# Patient Record
Sex: MALE | Race: WHITE | HISPANIC OR LATINO | Employment: FULL TIME | URBAN - METROPOLITAN AREA
[De-identification: names, ages, dates, MRNs, and addresses within clinical notes are randomized per-mention and may not be internally consistent; named-entity substitution may affect disease eponyms.]

---

## 2017-01-23 ENCOUNTER — HOSPITAL ENCOUNTER (EMERGENCY)
Facility: HOSPITAL | Age: 42
Discharge: HOME | End: 2017-01-23
Attending: EMERGENCY MEDICINE | Admitting: EMERGENCY MEDICINE

## 2017-01-23 ENCOUNTER — APPOINTMENT (EMERGENCY)
Dept: RADIOLOGY | Facility: HOSPITAL | Age: 42
End: 2017-01-23

## 2017-01-23 VITALS
WEIGHT: 251 LBS | RESPIRATION RATE: 18 BRPM | TEMPERATURE: 98.2 F | OXYGEN SATURATION: 96 % | HEART RATE: 106 BPM | DIASTOLIC BLOOD PRESSURE: 103 MMHG | SYSTOLIC BLOOD PRESSURE: 171 MMHG

## 2017-01-23 DIAGNOSIS — S93.409A ANKLE SPRAIN: Primary | ICD-10-CM

## 2017-01-23 PROCEDURE — 99283 EMERGENCY DEPT VISIT LOW MDM: CPT

## 2017-01-23 PROCEDURE — 73610 X-RAY EXAM OF ANKLE: CPT

## 2017-01-23 RX ORDER — TRAMADOL HYDROCHLORIDE 50 MG/1
50 TABLET ORAL EVERY 6 HOURS PRN
Qty: 15 TABLET | Refills: 0 | Status: SHIPPED | OUTPATIENT
Start: 2017-01-23 | End: 2019-04-09

## 2018-07-02 ENCOUNTER — HOSPITAL ENCOUNTER (EMERGENCY)
Facility: HOSPITAL | Age: 43
Discharge: HOME | End: 2018-07-02
Attending: EMERGENCY MEDICINE | Admitting: EMERGENCY MEDICINE

## 2018-07-02 ENCOUNTER — APPOINTMENT (EMERGENCY)
Dept: CT IMAGING | Facility: HOSPITAL | Age: 43
End: 2018-07-02

## 2018-07-02 VITALS
HEIGHT: 67 IN | HEART RATE: 94 BPM | BODY MASS INDEX: 40.4 KG/M2 | SYSTOLIC BLOOD PRESSURE: 169 MMHG | WEIGHT: 257.4 LBS | OXYGEN SATURATION: 99 % | RESPIRATION RATE: 16 BRPM | DIASTOLIC BLOOD PRESSURE: 84 MMHG | TEMPERATURE: 99.1 F

## 2018-07-02 DIAGNOSIS — N20.0 LEFT NEPHROLITHIASIS: Primary | ICD-10-CM

## 2018-07-02 LAB
BACTERIA UR QL AUTO: ABNORMAL /HPF
BILIRUB UR QL STRIP: NEGATIVE
CLARITY UR: CLEAR
COLOR UR: YELLOW
GLUCOSE UR STRIP-MCNC: NEGATIVE MG/DL
HGB UR QL STRIP.AUTO: ABNORMAL
KETONES UR STRIP-MCNC: NEGATIVE MG/DL
LEUKOCYTE ESTERASE UR QL STRIP: NEGATIVE
NITRITE UR QL STRIP: NEGATIVE
NON-SQ EPI CELLS URNS QL MICRO: ABNORMAL /HPF
PH UR STRIP.AUTO: 6 [PH] (ref 4.5–8)
PROT UR STRIP-MCNC: ABNORMAL MG/DL
RBC #/AREA URNS AUTO: ABNORMAL /HPF
SP GR UR STRIP.AUTO: 1.02 (ref 1–1.03)
UROBILINOGEN UR QL STRIP.AUTO: 0.2 E.U./DL
WBC #/AREA URNS AUTO: ABNORMAL /HPF

## 2018-07-02 PROCEDURE — 99284 EMERGENCY DEPT VISIT MOD MDM: CPT

## 2018-07-02 PROCEDURE — 74176 CT ABD & PELVIS W/O CONTRAST: CPT

## 2018-07-02 PROCEDURE — 81001 URINALYSIS AUTO W/SCOPE: CPT

## 2018-07-02 RX ORDER — NAPROXEN 500 MG/1
500 TABLET ORAL 2 TIMES DAILY PRN
Qty: 20 TABLET | Refills: 0 | Status: SHIPPED | OUTPATIENT
Start: 2018-07-02 | End: 2019-04-09

## 2018-07-02 NOTE — DISCHARGE INSTRUCTIONS
Kidney Stones   WHAT YOU NEED TO KNOW:   Kidney stones form in the urinary system when the water and waste in your urine are out of balance  When this happens, certain types of waste crystals separate from the urine  The crystals build up and form kidney stones  You may have 1 or more kidney stones  DISCHARGE INSTRUCTIONS:   Return to the emergency department if:   · You have vomiting that is not relieved by medicine  Contact your healthcare provider if:   · You have a fever  · You have trouble passing urine  · You see blood in your urine  · You have severe pain  · You have any questions or concerns about your condition or care  Medicines:   · NSAIDs , such as ibuprofen, help decrease swelling, pain, and fever  This medicine is available with or without a doctor's order  NSAIDs can cause stomach bleeding or kidney problems in certain people  If you take blood thinner medicine, always ask your healthcare provider if NSAIDs are safe for you  Always read the medicine label and follow directions  · Prescription medicine  may be given  Ask how to take this medicine safely  · Medicines  to balance your electrolytes may be needed  · Take your medicine as directed  Contact your healthcare provider if you think your medicine is not helping or if you have side effects  Tell him or her if you are allergic to any medicine  Keep a list of the medicines, vitamins, and herbs you take  Include the amounts, and when and why you take them  Bring the list or the pill bottles to follow-up visits  Carry your medicine list with you in case of an emergency  Follow up with your healthcare provider as directed: You may need to return for more tests  Write down your questions so you remember to ask them during your visits  Self-care:   · Drink plenty of liquids  Your healthcare provider may tell you to drink at least 8 to 12 (eight-ounce) cups of liquids each day   This helps flush out the kidney stones when you urinate  Water is the best liquid to drink  · Strain your urine every time you go to the bathroom  Urinate through a strainer or a piece of thin cloth to catch the stones  Take the stones to your healthcare provider so they can be sent to the lab for tests  This will help your healthcare providers plan the best treatment for you  · Eat a variety of healthy foods  Healthy foods include fruits, vegetables, whole-grain breads, low-fat dairy products, beans, and fish  You may need to limit how much sodium (salt) or protein you eat  Ask for information about the best foods for you  · Stay active  Your stones may pass more easily by if you stay active  Ask about the best activities for you  After you pass your kidney stones:  Once you have passed your kidney stones, your healthcare provider may  order a 24-hour urine test  Results from a 24-hour urine test will help your healthcare provider plan ways to prevent more stones from forming  If you are told to do a 24-hour test, your healthcare provider will give you more instructions  © 2017 [facility] Information is for End User's use only and may not be sold, redistributed or otherwise used for commercial purposes  All illustrations and images included in CareNotes® are the copyrighted property of A D A M , Inc  or [employer]  The above information is an  only  It is not intended as medical advice for individual conditions or treatments  Talk to your doctor, nurse or pharmacist before following any medical regimen to see if it is safe and effective for you

## 2018-07-02 NOTE — ED NOTES
Discharge instructions reviewed by Dr Addi Lee  Pt verbalized understanding        Antony George RN  07/02/18 0143

## 2018-07-02 NOTE — ED PROVIDER NOTES
History  Chief Complaint   Patient presents with    Back Pain     pt ambulatory on unit with c/o intermittent lower back pain x2 days  denies injury        History provided by:  Patient and spouse  Back Pain   Location:  Lumbar spine (left side)  Quality:  Aching  Radiates to:  Does not radiate  Pain severity:  Moderate  Pain is:  Same all the time  Onset quality:  Gradual  Duration:  2 days  Timing:  Intermittent  Progression:  Waxing and waning  Chronicity:  New  Context: not falling and not MVA    Context comment:  No injury no history of overuse type pattern  , started gradually  Relieved by:  None tried  Worsened by:  Nothing  Ineffective treatments:  None tried  Associated symptoms: no abdominal pain, no bladder incontinence, no bowel incontinence, no chest pain, no dysuria, no fever, no headaches, no numbness, no pelvic pain, no perianal numbness, no tingling, no weakness and no weight loss        Prior to Admission Medications   Prescriptions Last Dose Informant Patient Reported? Taking?   traMADol (ULTRAM) 50 mg tablet   No No   Sig: Take 1 tablet by mouth every 6 (six) hours as needed for moderate pain for up to 15 doses      Facility-Administered Medications: None       Past Medical History:   Diagnosis Date    Hyperlipidemia     Hypertension        History reviewed  No pertinent surgical history  History reviewed  No pertinent family history  I have reviewed and agree with the history as documented  Social History   Substance Use Topics    Smoking status: Current Every Day Smoker     Packs/day: 2 00     Types: Cigarettes    Smokeless tobacco: Never Used    Alcohol use Yes      Comment: social        Review of Systems   Constitutional: Negative for activity change, chills, diaphoresis, fever and weight loss  HENT: Negative for congestion, sinus pressure and sore throat  Eyes: Negative for pain and visual disturbance     Respiratory: Negative for cough, chest tightness, shortness of breath, wheezing and stridor  Cardiovascular: Negative for chest pain and palpitations  Gastrointestinal: Negative for abdominal distention, abdominal pain, bowel incontinence, constipation, diarrhea, nausea and vomiting  Genitourinary: Negative for bladder incontinence, dysuria, frequency and pelvic pain  Musculoskeletal: Positive for back pain  Negative for neck pain and neck stiffness  Skin: Negative for rash  Neurological: Negative for dizziness, tingling, speech difficulty, weakness, light-headedness, numbness and headaches  Physical Exam  Physical Exam   Constitutional: He is oriented to person, place, and time  He appears well-developed  No distress  HENT:   Head: Normocephalic and atraumatic  Eyes: Pupils are equal, round, and reactive to light  Neck: Normal range of motion  Neck supple  No tracheal deviation present  Cardiovascular: Normal rate, regular rhythm, normal heart sounds and intact distal pulses  No murmur heard  Pulmonary/Chest: Effort normal and breath sounds normal  No stridor  No respiratory distress  Abdominal: Soft  He exhibits no distension  There is no tenderness  There is no rebound and no guarding  No CVA tenderness     Musculoskeletal: Normal range of motion  No spinous process tenderness,  , +2 patella and Achilles reflex bilaterally  Normal sensation normal muscle strength bilateral lower extremities     Neurological: He is alert and oriented to person, place, and time  Skin: Skin is warm and dry  He is not diaphoretic  No erythema  No pallor  Psychiatric: He has a normal mood and affect  Vitals reviewed        Vital Signs  ED Triage Vitals   Temperature Pulse Respirations Blood Pressure SpO2   07/02/18 1409 07/02/18 1409 07/02/18 1409 07/02/18 1411 07/02/18 1409   99 1 °F (37 3 °C) (!) 106 18 (!) 186/112 97 %      Temp Source Heart Rate Source Patient Position - Orthostatic VS BP Location FiO2 (%)   07/02/18 1409 07/02/18 1409 07/02/18 1411 07/02/18 1411 --   Oral Monitor Lying Right arm       Pain Score       07/02/18 1409       9           Vitals:    07/02/18 1409 07/02/18 1411 07/02/18 1525 07/02/18 1740   BP:  (!) 186/112 (!) 190/94 169/84   Pulse: (!) 106  98 94   Patient Position - Orthostatic VS:  Lying Lying Lying       Visual Acuity  Visual Acuity      Most Recent Value   L Pupil Size (mm)  3   R Pupil Size (mm)  3          ED Medications  Medications - No data to display    Diagnostic Studies  Results Reviewed     Procedure Component Value Units Date/Time    Urine Microscopic [[id number]]  (Abnormal) Collected:  07/02/18 1540    Lab Status:  Final result Specimen:  Urine from Urine, Clean Catch Updated:  07/02/18 1603     RBC, UA 1-2 (A) /hpf      WBC, UA 0-1 (A) /hpf      Epithelial Cells None Seen /hpf      Bacteria, UA None Seen /hpf     ED Urine Macroscopic [[id number]]  (Abnormal) Collected:  07/02/18 1544    Lab Status:  Final result Specimen:  Urine Updated:  07/02/18 1538     Color, UA Yellow     Clarity, UA Clear     pH, UA 6 0     Leukocytes, UA Negative     Nitrite, UA Negative     Protein, UA Trace (A) mg/dl      Glucose, UA Negative mg/dl      Ketones, UA Negative mg/dl      Urobilinogen, UA 0 2 E U /dl      Bilirubin, UA Negative     Blood, UA Moderate (A)     Specific Wallace, UA 1 025    Narrative:       CLINITEK RESULT                 CT renal stone study abdomen pelvis without contrast    (Results Pending)              Procedures  Procedures       Phone Contacts  ED Phone Contact    ED Course                               MDM  Number of Diagnoses or Management Options  Left nephrolithiasis: new and requires workup  Diagnosis management comments:       Initial ED assessment:  46-year-old male presents with left lower back pain   No injury    Initial DDx includes but is not limited to:  Muscular strain, nephrolithiasis    Initial ED plan:   Urinalysis, CT        Final ED summary/disposition:   After evaluation and workup in the emergency department,   patient found have a 2 mm kidney stone  , stuck at the UVJ  Pain controlled patient be discharged       Amount and/or Complexity of Data Reviewed  Clinical lab tests: ordered and reviewed  Tests in the radiology section of CPT®: ordered and reviewed  Decide to obtain previous medical records or to obtain history from someone other than the patient: yes  Obtain history from someone other than the patient: yes  Review and summarize past medical records: yes  Independent visualization of images, tracings, or specimens: yes      CritCare Time    Disposition  Final diagnoses:   Left nephrolithiasis     Time reflects when diagnosis was documented in both MDM as applicable and the Disposition within this note     Time User Action Codes Description Comment    7/2/2018  5:54 PM Rosita Larger Add [N20 0] Left nephrolithiasis       ED Disposition     ED Disposition Condition Comment    Discharge  [facility] discharge to home/self care  Condition at discharge: Good        Follow-up Information    None         Patient's Medications   Discharge Prescriptions    NAPROXEN (NAPROSYN) 500 MG TABLET    Take 1 tablet (500 mg total) by mouth 2 (two) times a day as needed for mild pain       Start Date: 7/2/2018  End Date: --       Order Dose: 500 mg       Quantity: 20 tablet    Refills: 0     No discharge procedures on file      ED Provider  Electronically Signed by           Yisel Lipscomb DO  07/02/18 1556

## 2019-04-09 ENCOUNTER — OFFICE VISIT (OUTPATIENT)
Dept: FAMILY MEDICINE CLINIC | Facility: CLINIC | Age: 44
End: 2019-04-09

## 2019-04-09 VITALS
HEIGHT: 67 IN | RESPIRATION RATE: 18 BRPM | BODY MASS INDEX: 40.53 KG/M2 | SYSTOLIC BLOOD PRESSURE: 150 MMHG | HEART RATE: 90 BPM | DIASTOLIC BLOOD PRESSURE: 98 MMHG | WEIGHT: 258.2 LBS | OXYGEN SATURATION: 98 %

## 2019-04-09 DIAGNOSIS — E66.01 CLASS 3 SEVERE OBESITY DUE TO EXCESS CALORIES WITH SERIOUS COMORBIDITY AND BODY MASS INDEX (BMI) OF 40.0 TO 44.9 IN ADULT (HCC): ICD-10-CM

## 2019-04-09 DIAGNOSIS — I10 ESSENTIAL HYPERTENSION: ICD-10-CM

## 2019-04-09 DIAGNOSIS — F17.210 CIGARETTE NICOTINE DEPENDENCE WITHOUT COMPLICATION: ICD-10-CM

## 2019-04-09 DIAGNOSIS — R06.83 LOUD SNORING: ICD-10-CM

## 2019-04-09 DIAGNOSIS — E78.5 HYPERLIPIDEMIA, UNSPECIFIED HYPERLIPIDEMIA TYPE: Primary | ICD-10-CM

## 2019-04-09 PROCEDURE — 99386 PREV VISIT NEW AGE 40-64: CPT | Performed by: PHYSICIAN ASSISTANT

## 2019-04-09 RX ORDER — LOSARTAN POTASSIUM 50 MG/1
50 TABLET ORAL DAILY
Qty: 30 TABLET | Refills: 0 | Status: SHIPPED | OUTPATIENT
Start: 2019-04-09 | End: 2019-04-16

## 2019-04-09 RX ORDER — VARENICLINE TARTRATE 25 MG
KIT ORAL
Qty: 53 TABLET | Refills: 0 | Status: SHIPPED | OUTPATIENT
Start: 2019-04-09 | End: 2019-04-11 | Stop reason: SDUPTHER

## 2019-04-11 DIAGNOSIS — F17.210 CIGARETTE NICOTINE DEPENDENCE WITHOUT COMPLICATION: ICD-10-CM

## 2019-04-11 RX ORDER — VARENICLINE TARTRATE 25 MG
KIT ORAL
Qty: 53 TABLET | Refills: 0 | Status: SHIPPED | OUTPATIENT
Start: 2019-04-11 | End: 2019-05-21

## 2019-04-14 ENCOUNTER — TELEPHONE (OUTPATIENT)
Dept: OTHER | Facility: OTHER | Age: 44
End: 2019-04-14

## 2019-04-14 LAB — HBA1C MFR BLD HPLC: 5.8 %

## 2019-04-15 ENCOUNTER — TELEPHONE (OUTPATIENT)
Dept: FAMILY MEDICINE CLINIC | Facility: CLINIC | Age: 44
End: 2019-04-15

## 2019-04-16 ENCOUNTER — TELEPHONE (OUTPATIENT)
Dept: FAMILY MEDICINE CLINIC | Facility: CLINIC | Age: 44
End: 2019-04-16

## 2019-04-16 DIAGNOSIS — I10 ESSENTIAL HYPERTENSION: ICD-10-CM

## 2019-04-16 RX ORDER — LOSARTAN POTASSIUM 100 MG/1
100 TABLET ORAL DAILY
Start: 2019-04-16 | End: 2019-04-26

## 2019-04-26 ENCOUNTER — TELEPHONE (OUTPATIENT)
Dept: FAMILY MEDICINE CLINIC | Facility: CLINIC | Age: 44
End: 2019-04-26

## 2019-04-26 DIAGNOSIS — I10 ESSENTIAL HYPERTENSION: ICD-10-CM

## 2019-04-26 RX ORDER — LOSARTAN POTASSIUM 100 MG/1
100 TABLET ORAL DAILY
Qty: 90 TABLET
Start: 2019-04-26 | End: 2019-04-29 | Stop reason: SDUPTHER

## 2019-04-26 RX ORDER — METOPROLOL TARTRATE 100 MG/1
100 TABLET ORAL EVERY 12 HOURS SCHEDULED
Qty: 180 TABLET | Refills: 0 | Status: SHIPPED | OUTPATIENT
Start: 2019-04-26 | End: 2019-04-29 | Stop reason: SDUPTHER

## 2019-04-29 ENCOUNTER — TELEPHONE (OUTPATIENT)
Dept: FAMILY MEDICINE CLINIC | Facility: CLINIC | Age: 44
End: 2019-04-29

## 2019-04-29 DIAGNOSIS — I10 ESSENTIAL HYPERTENSION: ICD-10-CM

## 2019-04-29 RX ORDER — LOSARTAN POTASSIUM 100 MG/1
100 TABLET ORAL DAILY
Qty: 90 TABLET | Refills: 0 | Status: SHIPPED | OUTPATIENT
Start: 2019-04-29 | End: 2019-06-20 | Stop reason: SDUPTHER

## 2019-04-29 RX ORDER — LOSARTAN POTASSIUM 100 MG/1
100 TABLET ORAL DAILY
Qty: 90 TABLET | Refills: 0 | Status: SHIPPED | OUTPATIENT
Start: 2019-04-29 | End: 2019-04-29 | Stop reason: SDUPTHER

## 2019-04-29 RX ORDER — METOPROLOL TARTRATE 100 MG/1
100 TABLET ORAL EVERY 12 HOURS SCHEDULED
Qty: 180 TABLET | Refills: 0 | Status: SHIPPED | OUTPATIENT
Start: 2019-04-29 | End: 2019-06-25 | Stop reason: SDUPTHER

## 2019-05-06 DIAGNOSIS — I10 ESSENTIAL HYPERTENSION: ICD-10-CM

## 2019-05-06 RX ORDER — LOSARTAN POTASSIUM 50 MG/1
TABLET ORAL
Qty: 30 TABLET | Refills: 0 | OUTPATIENT
Start: 2019-05-06

## 2019-05-09 ENCOUNTER — OFFICE VISIT (OUTPATIENT)
Dept: FAMILY MEDICINE CLINIC | Facility: CLINIC | Age: 44
End: 2019-05-09

## 2019-05-09 VITALS
RESPIRATION RATE: 18 BRPM | HEIGHT: 67 IN | BODY MASS INDEX: 41.75 KG/M2 | HEART RATE: 79 BPM | WEIGHT: 266 LBS | DIASTOLIC BLOOD PRESSURE: 90 MMHG | OXYGEN SATURATION: 98 % | SYSTOLIC BLOOD PRESSURE: 150 MMHG

## 2019-05-09 DIAGNOSIS — E66.01 CLASS 3 SEVERE OBESITY DUE TO EXCESS CALORIES WITH SERIOUS COMORBIDITY AND BODY MASS INDEX (BMI) OF 40.0 TO 44.9 IN ADULT (HCC): ICD-10-CM

## 2019-05-09 DIAGNOSIS — E78.2 MIXED HYPERLIPIDEMIA: ICD-10-CM

## 2019-05-09 DIAGNOSIS — F17.210 CIGARETTE NICOTINE DEPENDENCE WITHOUT COMPLICATION: ICD-10-CM

## 2019-05-09 DIAGNOSIS — I10 ESSENTIAL HYPERTENSION: Primary | ICD-10-CM

## 2019-05-09 PROCEDURE — 99212 OFFICE O/P EST SF 10 MIN: CPT | Performed by: PHYSICIAN ASSISTANT

## 2019-05-09 RX ORDER — ATORVASTATIN CALCIUM 20 MG/1
20 TABLET, FILM COATED ORAL DAILY
Qty: 90 TABLET | Refills: 0 | Status: SHIPPED | OUTPATIENT
Start: 2019-05-09 | End: 2019-06-25 | Stop reason: SDUPTHER

## 2019-05-09 RX ORDER — HYDROCHLOROTHIAZIDE 12.5 MG/1
12.5 TABLET ORAL DAILY
Qty: 30 TABLET | Refills: 0 | Status: SHIPPED | OUTPATIENT
Start: 2019-05-09 | End: 2019-06-04 | Stop reason: SDUPTHER

## 2019-05-20 ENCOUNTER — TELEPHONE (OUTPATIENT)
Dept: FAMILY MEDICINE CLINIC | Facility: CLINIC | Age: 44
End: 2019-05-20

## 2019-05-21 DIAGNOSIS — F17.210 CIGARETTE NICOTINE DEPENDENCE WITHOUT COMPLICATION: Primary | ICD-10-CM

## 2019-05-21 RX ORDER — VARENICLINE TARTRATE 1 MG/1
1 TABLET, FILM COATED ORAL 2 TIMES DAILY
Qty: 60 TABLET | Refills: 0 | Status: SHIPPED | OUTPATIENT
Start: 2019-05-21 | End: 2020-01-15 | Stop reason: ALTCHOICE

## 2019-06-04 DIAGNOSIS — I10 ESSENTIAL HYPERTENSION: ICD-10-CM

## 2019-06-04 RX ORDER — HYDROCHLOROTHIAZIDE 12.5 MG/1
TABLET ORAL
Qty: 30 TABLET | Refills: 0 | Status: SHIPPED | OUTPATIENT
Start: 2019-06-04 | End: 2019-06-25 | Stop reason: SDUPTHER

## 2019-06-08 DIAGNOSIS — I10 ESSENTIAL HYPERTENSION: ICD-10-CM

## 2019-06-10 RX ORDER — LOSARTAN POTASSIUM 50 MG/1
TABLET ORAL
Qty: 90 TABLET | Refills: 1 | Status: SHIPPED | OUTPATIENT
Start: 2019-06-10 | End: 2019-06-20

## 2019-06-18 ENCOUNTER — TELEPHONE (OUTPATIENT)
Dept: FAMILY MEDICINE CLINIC | Facility: CLINIC | Age: 44
End: 2019-06-18

## 2019-06-20 DIAGNOSIS — I10 ESSENTIAL HYPERTENSION: ICD-10-CM

## 2019-06-20 RX ORDER — LOSARTAN POTASSIUM 100 MG/1
100 TABLET ORAL DAILY
Qty: 90 TABLET | Refills: 0 | Status: SHIPPED | OUTPATIENT
Start: 2019-06-20 | End: 2019-10-09 | Stop reason: SDUPTHER

## 2019-06-25 ENCOUNTER — OFFICE VISIT (OUTPATIENT)
Dept: FAMILY MEDICINE CLINIC | Facility: CLINIC | Age: 44
End: 2019-06-25

## 2019-06-25 VITALS
DIASTOLIC BLOOD PRESSURE: 84 MMHG | RESPIRATION RATE: 18 BRPM | HEART RATE: 85 BPM | WEIGHT: 264 LBS | OXYGEN SATURATION: 98 % | BODY MASS INDEX: 41.44 KG/M2 | SYSTOLIC BLOOD PRESSURE: 138 MMHG | HEIGHT: 67 IN

## 2019-06-25 DIAGNOSIS — I10 ESSENTIAL HYPERTENSION: ICD-10-CM

## 2019-06-25 DIAGNOSIS — E78.2 MIXED HYPERLIPIDEMIA: ICD-10-CM

## 2019-06-25 DIAGNOSIS — E66.01 CLASS 3 SEVERE OBESITY DUE TO EXCESS CALORIES WITH SERIOUS COMORBIDITY AND BODY MASS INDEX (BMI) OF 40.0 TO 44.9 IN ADULT (HCC): ICD-10-CM

## 2019-06-25 DIAGNOSIS — F17.210 CIGARETTE NICOTINE DEPENDENCE WITHOUT COMPLICATION: Primary | ICD-10-CM

## 2019-06-25 DIAGNOSIS — R06.83 LOUD SNORING: ICD-10-CM

## 2019-06-25 PROCEDURE — 99212 OFFICE O/P EST SF 10 MIN: CPT | Performed by: PHYSICIAN ASSISTANT

## 2019-06-25 RX ORDER — HYDROCHLOROTHIAZIDE 12.5 MG/1
12.5 TABLET ORAL DAILY
Qty: 90 TABLET | Refills: 0 | Status: SHIPPED | OUTPATIENT
Start: 2019-06-25 | End: 2019-08-26 | Stop reason: SDUPTHER

## 2019-06-25 RX ORDER — METOPROLOL TARTRATE 100 MG/1
100 TABLET ORAL EVERY 12 HOURS SCHEDULED
Qty: 180 TABLET | Refills: 0 | Status: SHIPPED | OUTPATIENT
Start: 2019-06-25 | End: 2020-01-15 | Stop reason: SDUPTHER

## 2019-06-25 RX ORDER — ATORVASTATIN CALCIUM 20 MG/1
20 TABLET, FILM COATED ORAL DAILY
Qty: 90 TABLET | Refills: 0 | Status: SHIPPED | OUTPATIENT
Start: 2019-06-25 | End: 2020-01-15 | Stop reason: SDUPTHER

## 2019-08-26 DIAGNOSIS — I10 ESSENTIAL HYPERTENSION: ICD-10-CM

## 2019-08-26 RX ORDER — HYDROCHLOROTHIAZIDE 12.5 MG/1
TABLET ORAL
Qty: 90 TABLET | Refills: 0 | Status: SHIPPED | OUTPATIENT
Start: 2019-08-26 | End: 2020-01-15 | Stop reason: SDUPTHER

## 2019-10-09 DIAGNOSIS — I10 ESSENTIAL HYPERTENSION: ICD-10-CM

## 2019-10-10 RX ORDER — LOSARTAN POTASSIUM 100 MG/1
100 TABLET ORAL DAILY
Qty: 90 TABLET | Refills: 0 | Status: SHIPPED | OUTPATIENT
Start: 2019-10-10 | End: 2020-01-15 | Stop reason: SDUPTHER

## 2020-01-15 ENCOUNTER — OFFICE VISIT (OUTPATIENT)
Dept: FAMILY MEDICINE CLINIC | Facility: CLINIC | Age: 45
End: 2020-01-15

## 2020-01-15 VITALS
SYSTOLIC BLOOD PRESSURE: 128 MMHG | RESPIRATION RATE: 18 BRPM | WEIGHT: 258.9 LBS | HEART RATE: 76 BPM | BODY MASS INDEX: 40.63 KG/M2 | DIASTOLIC BLOOD PRESSURE: 74 MMHG | HEIGHT: 67 IN | OXYGEN SATURATION: 98 %

## 2020-01-15 DIAGNOSIS — E78.2 MIXED HYPERLIPIDEMIA: ICD-10-CM

## 2020-01-15 DIAGNOSIS — E66.01 MORBID OBESITY (HCC): ICD-10-CM

## 2020-01-15 DIAGNOSIS — I10 ESSENTIAL HYPERTENSION: ICD-10-CM

## 2020-01-15 DIAGNOSIS — IMO0002 LUMP: Primary | ICD-10-CM

## 2020-01-15 PROCEDURE — 99213 OFFICE O/P EST LOW 20 MIN: CPT | Performed by: NURSE PRACTITIONER

## 2020-01-15 RX ORDER — HYDROCHLOROTHIAZIDE 12.5 MG/1
12.5 TABLET ORAL DAILY
Qty: 90 TABLET | Refills: 0 | Status: SHIPPED | OUTPATIENT
Start: 2020-01-15 | End: 2020-11-11 | Stop reason: SDUPTHER

## 2020-01-15 RX ORDER — METOPROLOL TARTRATE 100 MG/1
100 TABLET ORAL EVERY 12 HOURS SCHEDULED
Qty: 180 TABLET | Refills: 0 | Status: SHIPPED | OUTPATIENT
Start: 2020-01-15 | End: 2020-11-11 | Stop reason: ALTCHOICE

## 2020-01-15 RX ORDER — LOSARTAN POTASSIUM 100 MG/1
100 TABLET ORAL DAILY
Qty: 90 TABLET | Refills: 0 | Status: SHIPPED | OUTPATIENT
Start: 2020-01-15 | End: 2020-11-11 | Stop reason: SDUPTHER

## 2020-01-15 RX ORDER — ATORVASTATIN CALCIUM 20 MG/1
20 TABLET, FILM COATED ORAL DAILY
Qty: 90 TABLET | Refills: 0 | Status: SHIPPED | OUTPATIENT
Start: 2020-01-15 | End: 2020-11-11 | Stop reason: SDUPTHER

## 2020-01-15 NOTE — PROGRESS NOTES
Assessment/Plan:      Diagnoses and all orders for this visit:    Lump  -     Ambulatory referral to General Surgery; Future    Lump noted on left upper arm  Patient reports that he has had it for years  No signs of infection noted  Patient referred to Dr Conner Lu for further evaluation  Essential hypertension  -     losartan (COZAAR) 100 MG tablet; Take 1 tablet (100 mg total) by mouth daily  -     metoprolol tartrate (LOPRESSOR) 100 mg tablet; Take 1 tablet (100 mg total) by mouth every 12 (twelve) hours  -     hydrochlorothiazide (HYDRODIURIL) 12 5 mg tablet; Take 1 tablet (12 5 mg total) by mouth daily  -     Comprehensive metabolic panel; Future  -     CBC and differential; Future  -     Lipid Panel with Direct LDL reflex; Future    /74  Continue current medications  Lab work ordered  Morbid obesity ([address] )  -     Comprehensive metabolic panel; Future  -     CBC and differential; Future  -     TSH, 3rd generation with Free T4 reflex; Future    Patient instructed to eat a healthy low fat diet and to exercise on a regular basis  Mixed hyperlipidemia  -     atorvastatin (LIPITOR) 20 mg tablet; Take 1 tablet (20 mg total) by mouth daily  -     Comprehensive metabolic panel; Future  -     CBC and differential; Future  -     TSH, 3rd generation with Free T4 reflex; Future  -     Lipid Panel with Direct LDL reflex; Future    Lipid panel ordered  Low fat diet reviewed  Patient instructed to follow-up in 3 months or sooner prn  BMI Counseling: Body mass index is 40 55 kg/m²  The BMI is above normal  Nutrition recommendations include encouraging healthy choices of fruits and vegetables and consuming healthier snacks  Exercise recommendations include exercising 3-5 times per week  Subjective:     Patient ID: Nola Kessler is a 45 y o  male  Patient is here for a follow-up for chronic medical conditions  Patient is here for a follow-up for HTN   Patient reports that he takes losartan, metoprolol, and HCTZ for HTN  Denies any chest pain, SOB, palpitations, dizziness, or Has  Patient reports that he does not check his blood pressure at home  Patient is here for a follow-up for hyperlipidemia  Patient reports that he takes atorvastatin daily  Denies any side effects  Patient is here for a follow-up for obesity  Patient reports that he does not watch his diet  Patient reports that he does not exercise on a regular basis  Patient reports a lump on his left upper arm  Patient reports that he has had it for years  Denies any pain, fever, or drainage  Patient reports that it has gotten alittle larger  Review of Systems   Constitutional: Negative for appetite change, chills, fatigue and fever  Respiratory: Negative for cough, chest tightness, shortness of breath and wheezing  Cardiovascular: Negative for chest pain, palpitations and leg swelling  Gastrointestinal: Negative for abdominal pain, diarrhea, nausea and vomiting  Genitourinary: Negative for dysuria, frequency and hematuria  Musculoskeletal: Negative for myalgias  Skin: Negative for rash  Neurological: Negative for dizziness, seizures, syncope, light-headedness and headaches  Psychiatric/Behavioral: Negative for suicidal ideas  Denies any depression  Objective:     Physical Exam   Constitutional: He is oriented to person, place, and time  No distress  obese   HENT:   Right Ear: External ear normal    Left Ear: External ear normal    Mouth/Throat: Oropharynx is clear and moist    Eyes: Pupils are equal, round, and reactive to light  Conjunctivae are normal    Neck: Normal range of motion  Cardiovascular: Normal rate, regular rhythm and normal heart sounds  Radial pulses +2 bilaterally  No edema noted  Pulmonary/Chest: Effort normal and breath sounds normal    Musculoskeletal:   Gait wnl  Lymphadenopathy:     He has no cervical adenopathy     Neurological: He is alert and oriented to person, place, and time  Skin: No rash noted  Firm lump noted on left upper arm  No redness, swelling, or warmth noted  Psychiatric: He has a normal mood and affect  Vitals reviewed

## 2020-11-11 ENCOUNTER — OFFICE VISIT (OUTPATIENT)
Dept: FAMILY MEDICINE CLINIC | Facility: CLINIC | Age: 45
End: 2020-11-11

## 2020-11-11 VITALS
WEIGHT: 254 LBS | SYSTOLIC BLOOD PRESSURE: 164 MMHG | HEART RATE: 98 BPM | HEIGHT: 67 IN | DIASTOLIC BLOOD PRESSURE: 98 MMHG | BODY MASS INDEX: 39.87 KG/M2 | OXYGEN SATURATION: 98 % | TEMPERATURE: 98.2 F | RESPIRATION RATE: 20 BRPM

## 2020-11-11 DIAGNOSIS — E66.9 OBESITY (BMI 30-39.9): ICD-10-CM

## 2020-11-11 DIAGNOSIS — E78.5 HYPERLIPIDEMIA, UNSPECIFIED HYPERLIPIDEMIA TYPE: ICD-10-CM

## 2020-11-11 DIAGNOSIS — I10 ESSENTIAL HYPERTENSION: Primary | ICD-10-CM

## 2020-11-11 PROCEDURE — 99212 OFFICE O/P EST SF 10 MIN: CPT | Performed by: NURSE PRACTITIONER

## 2020-11-11 RX ORDER — HYDROCHLOROTHIAZIDE 12.5 MG/1
12.5 TABLET ORAL DAILY
Qty: 30 TABLET | Refills: 0 | Status: SHIPPED | OUTPATIENT
Start: 2020-11-11 | End: 2020-12-16 | Stop reason: SDUPTHER

## 2020-11-11 RX ORDER — ATORVASTATIN CALCIUM 20 MG/1
20 TABLET, FILM COATED ORAL DAILY
Qty: 30 TABLET | Refills: 0 | Status: SHIPPED | OUTPATIENT
Start: 2020-11-11 | End: 2021-01-14 | Stop reason: SDUPTHER

## 2020-11-11 RX ORDER — LOSARTAN POTASSIUM 50 MG/1
50 TABLET ORAL DAILY
Qty: 30 TABLET | Refills: 0 | Status: SHIPPED | OUTPATIENT
Start: 2020-11-11 | End: 2020-11-18 | Stop reason: SDUPTHER

## 2020-11-11 RX ORDER — ATORVASTATIN CALCIUM 20 MG/1
20 TABLET, FILM COATED ORAL DAILY
Qty: 30 TABLET | Refills: 0 | Status: SHIPPED | OUTPATIENT
Start: 2020-11-11 | End: 2020-11-11 | Stop reason: SDUPTHER

## 2020-11-16 ENCOUNTER — LAB (OUTPATIENT)
Dept: LAB | Facility: CLINIC | Age: 45
End: 2020-11-16

## 2020-11-16 ENCOUNTER — TRANSCRIBE ORDERS (OUTPATIENT)
Dept: LAB | Facility: CLINIC | Age: 45
End: 2020-11-16

## 2020-11-16 DIAGNOSIS — E66.9 OBESITY (BMI 30-39.9): ICD-10-CM

## 2020-11-16 DIAGNOSIS — I10 ESSENTIAL HYPERTENSION: ICD-10-CM

## 2020-11-16 DIAGNOSIS — E78.5 HYPERLIPIDEMIA, UNSPECIFIED HYPERLIPIDEMIA TYPE: ICD-10-CM

## 2020-11-16 LAB
ALBUMIN SERPL BCP-MCNC: 3.6 G/DL (ref 3.5–5)
ALP SERPL-CCNC: 88 U/L (ref 46–116)
ALT SERPL W P-5'-P-CCNC: 31 U/L (ref 12–78)
ANION GAP SERPL CALCULATED.3IONS-SCNC: 12 MMOL/L (ref 4–13)
AST SERPL W P-5'-P-CCNC: 12 U/L (ref 5–45)
BASOPHILS # BLD AUTO: 0.11 THOUSANDS/ΜL (ref 0–0.1)
BASOPHILS NFR BLD AUTO: 1 % (ref 0–1)
BILIRUB SERPL-MCNC: 0.45 MG/DL (ref 0.2–1)
BUN SERPL-MCNC: 10 MG/DL (ref 5–25)
CALCIUM SERPL-MCNC: 9 MG/DL (ref 8.3–10.1)
CHLORIDE SERPL-SCNC: 106 MMOL/L (ref 100–108)
CHOLEST SERPL-MCNC: 179 MG/DL (ref 50–200)
CO2 SERPL-SCNC: 26 MMOL/L (ref 21–32)
CREAT SERPL-MCNC: 0.87 MG/DL (ref 0.6–1.3)
EOSINOPHIL # BLD AUTO: 0.46 THOUSAND/ΜL (ref 0–0.61)
EOSINOPHIL NFR BLD AUTO: 4 % (ref 0–6)
ERYTHROCYTE [DISTWIDTH] IN BLOOD BY AUTOMATED COUNT: 16.9 % (ref 11.6–15.1)
GFR SERPL CREATININE-BSD FRML MDRD: 106 ML/MIN/1.73SQ M
GLUCOSE P FAST SERPL-MCNC: 94 MG/DL (ref 65–99)
HCT VFR BLD AUTO: 50.9 % (ref 36.5–49.3)
HDLC SERPL-MCNC: 25 MG/DL
HGB BLD-MCNC: 15.8 G/DL (ref 12–17)
IMM GRANULOCYTES # BLD AUTO: 0.03 THOUSAND/UL (ref 0–0.2)
IMM GRANULOCYTES NFR BLD AUTO: 0 % (ref 0–2)
LDLC SERPL CALC-MCNC: 116 MG/DL (ref 0–100)
LYMPHOCYTES # BLD AUTO: 4.32 THOUSANDS/ΜL (ref 0.6–4.47)
LYMPHOCYTES NFR BLD AUTO: 41 % (ref 14–44)
MCH RBC QN AUTO: 24 PG (ref 26.8–34.3)
MCHC RBC AUTO-ENTMCNC: 31 G/DL (ref 31.4–37.4)
MCV RBC AUTO: 77 FL (ref 82–98)
MONOCYTES # BLD AUTO: 0.91 THOUSAND/ΜL (ref 0.17–1.22)
MONOCYTES NFR BLD AUTO: 9 % (ref 4–12)
NEUTROPHILS # BLD AUTO: 4.76 THOUSANDS/ΜL (ref 1.85–7.62)
NEUTS SEG NFR BLD AUTO: 45 % (ref 43–75)
NRBC BLD AUTO-RTO: 0 /100 WBCS
PLATELET # BLD AUTO: 379 THOUSANDS/UL (ref 149–390)
PMV BLD AUTO: 9.2 FL (ref 8.9–12.7)
POTASSIUM SERPL-SCNC: 4 MMOL/L (ref 3.5–5.3)
PROT SERPL-MCNC: 6.9 G/DL (ref 6.4–8.2)
RBC # BLD AUTO: 6.58 MILLION/UL (ref 3.88–5.62)
SODIUM SERPL-SCNC: 144 MMOL/L (ref 136–145)
TRIGL SERPL-MCNC: 191 MG/DL
WBC # BLD AUTO: 10.59 THOUSAND/UL (ref 4.31–10.16)

## 2020-11-16 PROCEDURE — 80053 COMPREHEN METABOLIC PANEL: CPT

## 2020-11-16 PROCEDURE — 36415 COLL VENOUS BLD VENIPUNCTURE: CPT

## 2020-11-16 PROCEDURE — 80061 LIPID PANEL: CPT

## 2020-11-16 PROCEDURE — 85025 COMPLETE CBC W/AUTO DIFF WBC: CPT

## 2020-11-18 ENCOUNTER — OFFICE VISIT (OUTPATIENT)
Dept: FAMILY MEDICINE CLINIC | Facility: CLINIC | Age: 45
End: 2020-11-18

## 2020-11-18 VITALS
SYSTOLIC BLOOD PRESSURE: 168 MMHG | WEIGHT: 253 LBS | TEMPERATURE: 97.8 F | OXYGEN SATURATION: 98 % | HEART RATE: 92 BPM | BODY MASS INDEX: 39.71 KG/M2 | HEIGHT: 67 IN | RESPIRATION RATE: 16 BRPM | DIASTOLIC BLOOD PRESSURE: 98 MMHG

## 2020-11-18 DIAGNOSIS — R79.89 ABNORMAL CBC: ICD-10-CM

## 2020-11-18 DIAGNOSIS — E78.5 HYPERLIPIDEMIA, UNSPECIFIED HYPERLIPIDEMIA TYPE: ICD-10-CM

## 2020-11-18 DIAGNOSIS — E66.9 OBESITY (BMI 30-39.9): ICD-10-CM

## 2020-11-18 DIAGNOSIS — I10 ESSENTIAL HYPERTENSION: Primary | ICD-10-CM

## 2020-11-18 PROCEDURE — 99212 OFFICE O/P EST SF 10 MIN: CPT | Performed by: NURSE PRACTITIONER

## 2020-11-18 RX ORDER — LOSARTAN POTASSIUM 100 MG/1
100 TABLET ORAL DAILY
Qty: 30 TABLET | Refills: 1 | Status: SHIPPED | OUTPATIENT
Start: 2020-11-18 | End: 2020-12-16 | Stop reason: SDUPTHER

## 2020-11-19 ENCOUNTER — TELEPHONE (OUTPATIENT)
Dept: SURGICAL ONCOLOGY | Facility: CLINIC | Age: 45
End: 2020-11-19

## 2020-11-25 ENCOUNTER — OFFICE VISIT (OUTPATIENT)
Dept: FAMILY MEDICINE CLINIC | Facility: CLINIC | Age: 45
End: 2020-11-25

## 2020-11-25 VITALS
HEART RATE: 94 BPM | DIASTOLIC BLOOD PRESSURE: 110 MMHG | OXYGEN SATURATION: 98 % | RESPIRATION RATE: 18 BRPM | SYSTOLIC BLOOD PRESSURE: 190 MMHG | WEIGHT: 257.4 LBS | BODY MASS INDEX: 40.4 KG/M2 | HEIGHT: 67 IN

## 2020-11-25 DIAGNOSIS — E78.5 HYPERLIPIDEMIA, UNSPECIFIED HYPERLIPIDEMIA TYPE: ICD-10-CM

## 2020-11-25 DIAGNOSIS — I10 ESSENTIAL HYPERTENSION: Primary | ICD-10-CM

## 2020-11-25 PROCEDURE — 99212 OFFICE O/P EST SF 10 MIN: CPT | Performed by: NURSE PRACTITIONER

## 2020-11-25 RX ORDER — CLONIDINE HYDROCHLORIDE 0.1 MG/1
0.1 TABLET ORAL EVERY 12 HOURS SCHEDULED
Qty: 180 TABLET | Refills: 0 | Status: SHIPPED | OUTPATIENT
Start: 2020-11-25 | End: 2020-12-30 | Stop reason: SDUPTHER

## 2020-12-02 ENCOUNTER — OFFICE VISIT (OUTPATIENT)
Dept: FAMILY MEDICINE CLINIC | Facility: CLINIC | Age: 45
End: 2020-12-02

## 2020-12-02 VITALS
SYSTOLIC BLOOD PRESSURE: 192 MMHG | DIASTOLIC BLOOD PRESSURE: 112 MMHG | BODY MASS INDEX: 40.34 KG/M2 | RESPIRATION RATE: 18 BRPM | WEIGHT: 257 LBS | OXYGEN SATURATION: 98 % | HEIGHT: 67 IN | HEART RATE: 88 BPM

## 2020-12-02 DIAGNOSIS — I10 ESSENTIAL HYPERTENSION: Primary | ICD-10-CM

## 2020-12-02 PROCEDURE — 99212 OFFICE O/P EST SF 10 MIN: CPT | Performed by: NURSE PRACTITIONER

## 2020-12-02 RX ORDER — AMLODIPINE BESYLATE 5 MG/1
5 TABLET ORAL DAILY
Qty: 30 TABLET | Refills: 0 | Status: SHIPPED | OUTPATIENT
Start: 2020-12-02 | End: 2020-12-09 | Stop reason: SDUPTHER

## 2020-12-03 DIAGNOSIS — I10 ESSENTIAL HYPERTENSION: ICD-10-CM

## 2020-12-03 RX ORDER — LOSARTAN POTASSIUM 50 MG/1
TABLET ORAL
Qty: 30 TABLET | Refills: 0 | OUTPATIENT
Start: 2020-12-03

## 2020-12-09 ENCOUNTER — OFFICE VISIT (OUTPATIENT)
Dept: FAMILY MEDICINE CLINIC | Facility: CLINIC | Age: 45
End: 2020-12-09

## 2020-12-09 VITALS
BODY MASS INDEX: 40.27 KG/M2 | HEIGHT: 67 IN | HEART RATE: 89 BPM | SYSTOLIC BLOOD PRESSURE: 158 MMHG | WEIGHT: 256.6 LBS | OXYGEN SATURATION: 98 % | DIASTOLIC BLOOD PRESSURE: 98 MMHG | RESPIRATION RATE: 18 BRPM

## 2020-12-09 DIAGNOSIS — I10 ESSENTIAL HYPERTENSION: ICD-10-CM

## 2020-12-09 PROCEDURE — 99212 OFFICE O/P EST SF 10 MIN: CPT | Performed by: NURSE PRACTITIONER

## 2020-12-09 RX ORDER — AMLODIPINE BESYLATE 5 MG/1
10 TABLET ORAL DAILY
Qty: 30 TABLET | Refills: 0
Start: 2020-12-09 | End: 2020-12-16 | Stop reason: SDUPTHER

## 2020-12-16 ENCOUNTER — OFFICE VISIT (OUTPATIENT)
Dept: FAMILY MEDICINE CLINIC | Facility: CLINIC | Age: 45
End: 2020-12-16

## 2020-12-16 VITALS
WEIGHT: 256 LBS | OXYGEN SATURATION: 98 % | BODY MASS INDEX: 40.18 KG/M2 | RESPIRATION RATE: 18 BRPM | HEART RATE: 84 BPM | HEIGHT: 67 IN | DIASTOLIC BLOOD PRESSURE: 78 MMHG | SYSTOLIC BLOOD PRESSURE: 136 MMHG

## 2020-12-16 DIAGNOSIS — I10 ESSENTIAL HYPERTENSION: Primary | ICD-10-CM

## 2020-12-16 DIAGNOSIS — E66.01 OBESITY, MORBID, BMI 40.0-49.9 (HCC): ICD-10-CM

## 2020-12-16 PROCEDURE — 99212 OFFICE O/P EST SF 10 MIN: CPT | Performed by: NURSE PRACTITIONER

## 2020-12-16 RX ORDER — HYDROCHLOROTHIAZIDE 12.5 MG/1
12.5 TABLET ORAL DAILY
Qty: 90 TABLET | Refills: 0 | Status: SHIPPED | OUTPATIENT
Start: 2020-12-16 | End: 2021-06-14

## 2020-12-16 RX ORDER — LOSARTAN POTASSIUM 100 MG/1
100 TABLET ORAL DAILY
Qty: 90 TABLET | Refills: 0 | Status: SHIPPED | OUTPATIENT
Start: 2020-12-16 | End: 2022-04-25

## 2020-12-16 RX ORDER — AMLODIPINE BESYLATE 10 MG/1
10 TABLET ORAL DAILY
Qty: 30 TABLET | Refills: 1 | Status: SHIPPED | OUTPATIENT
Start: 2020-12-16 | End: 2021-01-11

## 2020-12-23 ENCOUNTER — TELEPHONE (OUTPATIENT)
Dept: HEMATOLOGY ONCOLOGY | Facility: CLINIC | Age: 45
End: 2020-12-23

## 2020-12-24 ENCOUNTER — TELEPHONE (OUTPATIENT)
Dept: HEMATOLOGY ONCOLOGY | Facility: CLINIC | Age: 45
End: 2020-12-24

## 2020-12-24 ENCOUNTER — TRANSCRIBE ORDERS (OUTPATIENT)
Dept: LAB | Facility: CLINIC | Age: 45
End: 2020-12-24

## 2020-12-24 ENCOUNTER — CONSULT (OUTPATIENT)
Dept: HEMATOLOGY ONCOLOGY | Facility: CLINIC | Age: 45
End: 2020-12-24

## 2020-12-24 VITALS
HEART RATE: 103 BPM | TEMPERATURE: 97.2 F | BODY MASS INDEX: 40.18 KG/M2 | RESPIRATION RATE: 16 BRPM | DIASTOLIC BLOOD PRESSURE: 106 MMHG | HEIGHT: 67 IN | WEIGHT: 256 LBS | SYSTOLIC BLOOD PRESSURE: 180 MMHG

## 2020-12-24 DIAGNOSIS — F17.211 CIGARETTE NICOTINE DEPENDENCE IN REMISSION: ICD-10-CM

## 2020-12-24 DIAGNOSIS — D75.1 POLYCYTHEMIA: ICD-10-CM

## 2020-12-24 DIAGNOSIS — I10 ESSENTIAL HYPERTENSION: ICD-10-CM

## 2020-12-24 DIAGNOSIS — R79.89 ABNORMAL CBC: Primary | ICD-10-CM

## 2020-12-24 PROCEDURE — 99245 OFF/OP CONSLTJ NEW/EST HI 55: CPT | Performed by: NURSE PRACTITIONER

## 2020-12-24 RX ORDER — AMLODIPINE BESYLATE 5 MG/1
TABLET ORAL
Qty: 30 TABLET | Refills: 0 | OUTPATIENT
Start: 2020-12-24

## 2020-12-29 ENCOUNTER — DOCUMENTATION (OUTPATIENT)
Dept: HEMATOLOGY ONCOLOGY | Facility: CLINIC | Age: 45
End: 2020-12-29

## 2020-12-30 ENCOUNTER — TELEMEDICINE (OUTPATIENT)
Dept: FAMILY MEDICINE CLINIC | Facility: CLINIC | Age: 45
End: 2020-12-30

## 2020-12-30 ENCOUNTER — APPOINTMENT (OUTPATIENT)
Dept: LAB | Facility: CLINIC | Age: 45
End: 2020-12-30

## 2020-12-30 VITALS — DIASTOLIC BLOOD PRESSURE: 88 MMHG | SYSTOLIC BLOOD PRESSURE: 148 MMHG

## 2020-12-30 DIAGNOSIS — E78.5 HYPERLIPIDEMIA, UNSPECIFIED HYPERLIPIDEMIA TYPE: ICD-10-CM

## 2020-12-30 DIAGNOSIS — R79.89 ABNORMAL CBC: ICD-10-CM

## 2020-12-30 DIAGNOSIS — I10 ESSENTIAL HYPERTENSION: Primary | ICD-10-CM

## 2020-12-30 DIAGNOSIS — D75.1 POLYCYTHEMIA: ICD-10-CM

## 2020-12-30 LAB
ALBUMIN SERPL BCP-MCNC: 4 G/DL (ref 3.5–5)
ALP SERPL-CCNC: 95 U/L (ref 46–116)
ALT SERPL W P-5'-P-CCNC: 37 U/L (ref 12–78)
ANION GAP SERPL CALCULATED.3IONS-SCNC: 9 MMOL/L (ref 4–13)
AST SERPL W P-5'-P-CCNC: 23 U/L (ref 5–45)
BASOPHILS # BLD AUTO: 0.12 THOUSANDS/ΜL (ref 0–0.1)
BASOPHILS NFR BLD AUTO: 1 % (ref 0–1)
BILIRUB SERPL-MCNC: 0.54 MG/DL (ref 0.2–1)
BUN SERPL-MCNC: 9 MG/DL (ref 5–25)
CALCIUM SERPL-MCNC: 9.5 MG/DL (ref 8.3–10.1)
CHLORIDE SERPL-SCNC: 101 MMOL/L (ref 100–108)
CO2 SERPL-SCNC: 30 MMOL/L (ref 21–32)
CREAT SERPL-MCNC: 0.9 MG/DL (ref 0.6–1.3)
EOSINOPHIL # BLD AUTO: 0.49 THOUSAND/ΜL (ref 0–0.61)
EOSINOPHIL NFR BLD AUTO: 4 % (ref 0–6)
ERYTHROCYTE [DISTWIDTH] IN BLOOD BY AUTOMATED COUNT: 16.8 % (ref 11.6–15.1)
FERRITIN SERPL-MCNC: 174 NG/ML (ref 8–388)
GFR SERPL CREATININE-BSD FRML MDRD: 104 ML/MIN/1.73SQ M
GLUCOSE P FAST SERPL-MCNC: 117 MG/DL (ref 65–99)
HCT VFR BLD AUTO: 49.8 % (ref 36.5–49.3)
HGB BLD-MCNC: 15.4 G/DL (ref 12–17)
IMM GRANULOCYTES # BLD AUTO: 0.03 THOUSAND/UL (ref 0–0.2)
IMM GRANULOCYTES NFR BLD AUTO: 0 % (ref 0–2)
IRON SATN MFR SERPL: 29 %
IRON SERPL-MCNC: 100 UG/DL (ref 65–175)
LYMPHOCYTES # BLD AUTO: 4.11 THOUSANDS/ΜL (ref 0.6–4.47)
LYMPHOCYTES NFR BLD AUTO: 35 % (ref 14–44)
MCH RBC QN AUTO: 24 PG (ref 26.8–34.3)
MCHC RBC AUTO-ENTMCNC: 30.9 G/DL (ref 31.4–37.4)
MCV RBC AUTO: 77 FL (ref 82–98)
MONOCYTES # BLD AUTO: 0.94 THOUSAND/ΜL (ref 0.17–1.22)
MONOCYTES NFR BLD AUTO: 8 % (ref 4–12)
NEUTROPHILS # BLD AUTO: 6.13 THOUSANDS/ΜL (ref 1.85–7.62)
NEUTS SEG NFR BLD AUTO: 52 % (ref 43–75)
NRBC BLD AUTO-RTO: 0 /100 WBCS
PLATELET # BLD AUTO: 444 THOUSANDS/UL (ref 149–390)
PMV BLD AUTO: 9.7 FL (ref 8.9–12.7)
POTASSIUM SERPL-SCNC: 3.1 MMOL/L (ref 3.5–5.3)
PROT SERPL-MCNC: 7.9 G/DL (ref 6.4–8.2)
RBC # BLD AUTO: 6.43 MILLION/UL (ref 3.88–5.62)
SODIUM SERPL-SCNC: 140 MMOL/L (ref 136–145)
TIBC SERPL-MCNC: 343 UG/DL (ref 250–450)
WBC # BLD AUTO: 11.82 THOUSAND/UL (ref 4.31–10.16)

## 2020-12-30 PROCEDURE — 81270 JAK2 GENE: CPT | Performed by: NURSE PRACTITIONER

## 2020-12-30 PROCEDURE — 99212 OFFICE O/P EST SF 10 MIN: CPT | Performed by: NURSE PRACTITIONER

## 2020-12-30 PROCEDURE — 81207 BCR/ABL1 GENE MINOR BP: CPT | Performed by: NURSE PRACTITIONER

## 2020-12-30 PROCEDURE — 82728 ASSAY OF FERRITIN: CPT

## 2020-12-30 PROCEDURE — 80053 COMPREHEN METABOLIC PANEL: CPT

## 2020-12-30 PROCEDURE — 82668 ASSAY OF ERYTHROPOIETIN: CPT

## 2020-12-30 PROCEDURE — 81206 BCR/ABL1 GENE MAJOR BP: CPT | Performed by: NURSE PRACTITIONER

## 2020-12-30 PROCEDURE — 83540 ASSAY OF IRON: CPT

## 2020-12-30 PROCEDURE — 36415 COLL VENOUS BLD VENIPUNCTURE: CPT | Performed by: NURSE PRACTITIONER

## 2020-12-30 PROCEDURE — 85025 COMPLETE CBC W/AUTO DIFF WBC: CPT | Performed by: NURSE PRACTITIONER

## 2020-12-30 PROCEDURE — 83550 IRON BINDING TEST: CPT

## 2020-12-30 RX ORDER — CLONIDINE HYDROCHLORIDE 0.1 MG/1
0.2 TABLET ORAL EVERY 12 HOURS SCHEDULED
Qty: 180 TABLET | Refills: 0
Start: 2020-12-30 | End: 2021-01-19 | Stop reason: SDUPTHER

## 2020-12-31 LAB — EPO SERPL-ACNC: 8 MIU/ML (ref 2.6–18.5)

## 2021-01-06 LAB
SCAN RESULT: NORMAL
SCAN RESULT: NORMAL

## 2021-01-11 DIAGNOSIS — I10 ESSENTIAL HYPERTENSION: ICD-10-CM

## 2021-01-11 RX ORDER — AMLODIPINE BESYLATE 10 MG/1
TABLET ORAL
Qty: 30 TABLET | Refills: 1 | Status: SHIPPED | OUTPATIENT
Start: 2021-01-11 | End: 2021-02-05

## 2021-01-12 ENCOUNTER — TELEMEDICINE (OUTPATIENT)
Dept: FAMILY MEDICINE CLINIC | Facility: CLINIC | Age: 46
End: 2021-01-12

## 2021-01-12 VITALS
WEIGHT: 256 LBS | DIASTOLIC BLOOD PRESSURE: 74 MMHG | SYSTOLIC BLOOD PRESSURE: 130 MMHG | HEIGHT: 67 IN | BODY MASS INDEX: 40.18 KG/M2

## 2021-01-12 DIAGNOSIS — E87.6 HYPOKALEMIA: ICD-10-CM

## 2021-01-12 DIAGNOSIS — E78.5 HYPERLIPIDEMIA, UNSPECIFIED HYPERLIPIDEMIA TYPE: ICD-10-CM

## 2021-01-12 DIAGNOSIS — I10 ESSENTIAL HYPERTENSION: Primary | ICD-10-CM

## 2021-01-12 PROCEDURE — 99212 OFFICE O/P EST SF 10 MIN: CPT | Performed by: NURSE PRACTITIONER

## 2021-01-12 NOTE — PROGRESS NOTES
Virtual Regular Visit      Assessment/Plan:    Problem List Items Addressed This Visit        Cardiovascular and Mediastinum    Essential hypertension - Primary  Patient reports that his BP has been 120s-140s/70s-80s at home  Continue current medications  Other    Hyperlipidemia  Continue atorvastatin  Low fat diet reviewed  Other Visit Diagnoses     Hypokalemia        Relevant Orders    Comprehensive metabolic panel    Patient had lab work ordered by hematology  Patient's potassium level was 3 1 on 12/30/20  Repeat CMP ordered  Will follow-up results with the patient  Patient instructed to follow-up in 3 months or sooner prn  Reason for visit is   Chief Complaint   Patient presents with    Follow-up    Virtual Regular Visit        Encounter provider Leopold Bodo, CRNP    Provider located at [address] [place] [id number]      Recent Visits  No visits were found meeting these conditions  Showing recent visits within past 7 days and meeting all other requirements     Today's Visits  Date Type Provider Dept   01/12/21 Telemedicine JO[facility]CE [facility]   Showing today's visits and meeting all other requirements     Future Appointments  No visits were found meeting these conditions  Showing future appointments within next 150 days and meeting all other requirements        The patient was identified by name and date of birth  Jake Listen was informed that this is a telemedicine visit and that the visit is being conducted through [facility] and patient was informed that this is a secure, HIPAA-compliant platform  He agrees to proceed     My office door was closed  No one else was in the room  He acknowledged consent and understanding of privacy and security of the video platform  The patient has agreed to participate and understands they can discontinue the visit at any time      Patient is aware this is a billable service  Subjective  Nicolasa Pichardo is a 39 y o  male    Patient is here for a follow-up for HTN  Patient reports that he feels well  Patient reports that his BP has been 120s-140s/70s-80s at home  Patient reports that he takes losartan 100mg daily, HCTZ 12 5mg daily, Clonidine 0 2mg BID, and amlodipine 10mg daily  Denies any chest pain, SOB, palpitations, dizziness, or Has  Patient reports that he recently had lab work done  Patient is here for a follow-up for hyperlipidemia  Patient reports that he takes atorvastatin daily  Patient reports that he tries to watch his diet  Past Medical History:   Diagnosis Date    Hyperlipidemia     Hypertension     Nicotine dependence        History reviewed  No pertinent surgical history  Current Outpatient Medications   Medication Sig Dispense Refill    amLODIPine (NORVASC) 10 mg tablet TAKE 1 TABLET BY MOUTH EVERY DAY 30 tablet 1    atorvastatin (LIPITOR) 20 mg tablet Take 1 tablet (20 mg total) by mouth daily 30 tablet 0    cloNIDine (CATAPRES) 0 1 mg tablet Take 2 tablets (0 2 mg total) by mouth every 12 (twelve) hours 180 tablet 0    hydrochlorothiazide (HYDRODIURIL) 12 5 mg tablet Take 1 tablet (12 5 mg total) by mouth daily 90 tablet 0    losartan (COZAAR) 100 MG tablet Take 1 tablet (100 mg total) by mouth daily 90 tablet 0     No current facility-administered medications for this visit  Allergies   Allergen Reactions    Aspirin Rash       Review of Systems   Constitutional: Negative for chills, fatigue and fever  HENT: Negative for congestion, ear pain and sore throat  Respiratory: Negative for cough, chest tightness and shortness of breath  Cardiovascular: Negative for chest pain, palpitations and leg swelling  Gastrointestinal: Negative for abdominal pain, diarrhea, nausea and vomiting  Musculoskeletal: Negative for myalgias and neck pain  Skin: Negative for rash     Neurological: Negative for dizziness, syncope, light-headedness and headaches  Video Exam    Vitals:    01/12/21 1310   BP: 130/74   Weight: 116 kg (256 lb)   Height: 5' 7" (1 702 m)       Physical Exam  Constitutional:       General: He is not in acute distress  Appearance: He is not ill-appearing or diaphoretic  HENT:      Right Ear: External ear normal       Left Ear: External ear normal    Pulmonary:      Effort: Pulmonary effort is normal  No respiratory distress  Neurological:      Mental Status: He is alert and oriented to person, place, and time  Psychiatric:         Mood and Affect: Mood normal           I spent 10 minutes directly with the patient during this visit      VIRTUAL VISIT [facility] acknowledges that he has consented to an online visit or consultation  He understands that the online visit is based solely on information provided by him, and that, in the absence of a face-to-face physical evaluation by the physician, the diagnosis he receives is both limited and provisional in terms of accuracy and completeness  This is not intended to replace a full medical face-to-face evaluation by the physician  [facility] understands and accepts these terms

## 2021-01-13 ENCOUNTER — APPOINTMENT (OUTPATIENT)
Dept: LAB | Facility: CLINIC | Age: 46
End: 2021-01-13

## 2021-01-13 DIAGNOSIS — E78.2 MIXED HYPERLIPIDEMIA: ICD-10-CM

## 2021-01-13 DIAGNOSIS — E87.6 HYPOKALEMIA: ICD-10-CM

## 2021-01-13 DIAGNOSIS — I10 ESSENTIAL HYPERTENSION: ICD-10-CM

## 2021-01-13 DIAGNOSIS — E66.01 MORBID OBESITY (HCC): ICD-10-CM

## 2021-01-13 LAB
ALBUMIN SERPL BCP-MCNC: 3.9 G/DL (ref 3.5–5)
ALP SERPL-CCNC: 88 U/L (ref 46–116)
ALT SERPL W P-5'-P-CCNC: 34 U/L (ref 12–78)
ANION GAP SERPL CALCULATED.3IONS-SCNC: 8 MMOL/L (ref 4–13)
AST SERPL W P-5'-P-CCNC: 17 U/L (ref 5–45)
BASOPHILS # BLD AUTO: 0.12 THOUSANDS/ΜL (ref 0–0.1)
BASOPHILS NFR BLD AUTO: 1 % (ref 0–1)
BILIRUB SERPL-MCNC: 0.27 MG/DL (ref 0.2–1)
BUN SERPL-MCNC: 12 MG/DL (ref 5–25)
CALCIUM SERPL-MCNC: 9.4 MG/DL (ref 8.3–10.1)
CHLORIDE SERPL-SCNC: 105 MMOL/L (ref 100–108)
CHOLEST SERPL-MCNC: 173 MG/DL (ref 50–200)
CO2 SERPL-SCNC: 28 MMOL/L (ref 21–32)
CREAT SERPL-MCNC: 0.83 MG/DL (ref 0.6–1.3)
EOSINOPHIL # BLD AUTO: 0.46 THOUSAND/ΜL (ref 0–0.61)
EOSINOPHIL NFR BLD AUTO: 5 % (ref 0–6)
ERYTHROCYTE [DISTWIDTH] IN BLOOD BY AUTOMATED COUNT: 16.5 % (ref 11.6–15.1)
GFR SERPL CREATININE-BSD FRML MDRD: 108 ML/MIN/1.73SQ M
GLUCOSE P FAST SERPL-MCNC: 103 MG/DL (ref 65–99)
HCT VFR BLD AUTO: 50.4 % (ref 36.5–49.3)
HDLC SERPL-MCNC: 27 MG/DL
HGB BLD-MCNC: 15.8 G/DL (ref 12–17)
IMM GRANULOCYTES # BLD AUTO: 0.04 THOUSAND/UL (ref 0–0.2)
IMM GRANULOCYTES NFR BLD AUTO: 0 % (ref 0–2)
LDLC SERPL CALC-MCNC: 101 MG/DL (ref 0–100)
LYMPHOCYTES # BLD AUTO: 3.58 THOUSANDS/ΜL (ref 0.6–4.47)
LYMPHOCYTES NFR BLD AUTO: 35 % (ref 14–44)
MCH RBC QN AUTO: 23.9 PG (ref 26.8–34.3)
MCHC RBC AUTO-ENTMCNC: 31.3 G/DL (ref 31.4–37.4)
MCV RBC AUTO: 76 FL (ref 82–98)
MONOCYTES # BLD AUTO: 0.87 THOUSAND/ΜL (ref 0.17–1.22)
MONOCYTES NFR BLD AUTO: 9 % (ref 4–12)
NEUTROPHILS # BLD AUTO: 5.03 THOUSANDS/ΜL (ref 1.85–7.62)
NEUTS SEG NFR BLD AUTO: 50 % (ref 43–75)
NRBC BLD AUTO-RTO: 0 /100 WBCS
PLATELET # BLD AUTO: 395 THOUSANDS/UL (ref 149–390)
PMV BLD AUTO: 9 FL (ref 8.9–12.7)
POTASSIUM SERPL-SCNC: 4.2 MMOL/L (ref 3.5–5.3)
PROT SERPL-MCNC: 8 G/DL (ref 6.4–8.2)
RBC # BLD AUTO: 6.6 MILLION/UL (ref 3.88–5.62)
SODIUM SERPL-SCNC: 141 MMOL/L (ref 136–145)
TRIGL SERPL-MCNC: 227 MG/DL
TSH SERPL DL<=0.05 MIU/L-ACNC: 2.14 UIU/ML (ref 0.36–3.74)
WBC # BLD AUTO: 10.1 THOUSAND/UL (ref 4.31–10.16)

## 2021-01-13 PROCEDURE — 85025 COMPLETE CBC W/AUTO DIFF WBC: CPT

## 2021-01-13 PROCEDURE — 36415 COLL VENOUS BLD VENIPUNCTURE: CPT

## 2021-01-13 PROCEDURE — 80053 COMPREHEN METABOLIC PANEL: CPT

## 2021-01-13 PROCEDURE — 80061 LIPID PANEL: CPT

## 2021-01-13 PROCEDURE — 84443 ASSAY THYROID STIM HORMONE: CPT

## 2021-01-14 DIAGNOSIS — E78.5 HYPERLIPIDEMIA, UNSPECIFIED HYPERLIPIDEMIA TYPE: ICD-10-CM

## 2021-01-14 RX ORDER — ATORVASTATIN CALCIUM 20 MG/1
20 TABLET, FILM COATED ORAL DAILY
Qty: 90 TABLET | Refills: 0 | Status: SHIPPED | OUTPATIENT
Start: 2021-01-14 | End: 2022-04-25

## 2021-01-19 DIAGNOSIS — I10 ESSENTIAL HYPERTENSION: ICD-10-CM

## 2021-01-20 RX ORDER — CLONIDINE HYDROCHLORIDE 0.1 MG/1
0.2 TABLET ORAL EVERY 12 HOURS SCHEDULED
Qty: 120 TABLET | Refills: 1 | Status: SHIPPED | OUTPATIENT
Start: 2021-01-20 | End: 2021-02-15

## 2021-02-03 ENCOUNTER — TELEPHONE (OUTPATIENT)
Dept: HEMATOLOGY ONCOLOGY | Facility: CLINIC | Age: 46
End: 2021-02-03

## 2021-02-03 NOTE — TELEPHONE ENCOUNTER
Reschedule Appointment     Who is calling in Patient    Doctor Appointment Scheduled with Pascale Heath date and time 2-4-2021 @ 8:00   New date and time 2- @ 8:30am    Location [facility]    Patient verbalized understanding

## 2021-02-05 DIAGNOSIS — I10 ESSENTIAL HYPERTENSION: ICD-10-CM

## 2021-02-05 RX ORDER — AMLODIPINE BESYLATE 10 MG/1
TABLET ORAL
Qty: 30 TABLET | Refills: 1 | Status: SHIPPED | OUTPATIENT
Start: 2021-02-05 | End: 2021-03-01

## 2021-02-09 NOTE — PROGRESS NOTES
Hematology/Oncology Outpatient Follow- up Note  Nola Maher, 1975, [id number]  2/11/2021        Chief Complaint   Patient presents with    Follow-up       HPI:  Nola Maher is a 38 yo male who is a former heavy smoker with a h/o hypertension who was referred to hematology for an elevated white count, elevated hematocrit that was detected on routine labs  He was seen for an initial consult on 12/24/20  Patient was noted to have some very mild leukocytosis with an elevated hematocrit  His differential was normal   Patient does have a long history of smoking, he is overweight and also suffers from hypertension  Possible etiologies include primary versus secondary polycythemia  Of note, patient's MCV is low which could also indicate an iron deficiency  Previous Hematologic/ Oncologic History:    Work up    Current Hematologic/ Oncologic Treatment:    Start daily oral iron    Interval History:  The patient presents for follow up to review results of workup that was ordered at his initial consult      Myeloproliferative work up reviewed  MANISHA 2 normal, no mutations identified  PCR BCR/ABL was normal    Erythropoietin level was normal 8, Iron panel normal: serum iron 100, iron saturation 29%, Ferritin 174  He did not have abdominal ultrasound completed  Most recent CBC on 1/13 demonstrated normal white count 10 10, RBC 6 6, Hemoglobin 15 8, Hct 50 4, MCV low at 76, platelets slightly elevated at 395  CMP normal    Patient states he feels well  He remains completely asymptomatic  Denies any chest pain, palpitations, headaches, lightheadedness, dizziness  No visual changes  No shortness of breath  No abnormal bleeding  No melena  Patient also denies any symptoms of recent/recurrent fevers/chills/infections  Denies any abnormal bleeding or easy bruisability  No night sweats, fatigue, pruritus  He continues to follow closely with his PCP for blood pressure management         Cancer Staging: Cancer Staging  No matching staging information was found for the patient  Molecular Testing:         Test Results:    Imaging: No results found  Labs:   Lab Results   Component Value Date    WBC 10 10 01/13/2021    HGB 15 8 01/13/2021    HCT 50 4 (H) 01/13/2021    MCV 76 (L) 01/13/2021     (H) 01/13/2021     Lab Results   Component Value Date     02/14/2015    K 4 2 01/13/2021     01/13/2021    CO2 28 01/13/2021    ANIONGAP 5 02/14/2015    BUN 12 01/13/2021    CREATININE 0 83 01/13/2021    GLUCOSE 91 02/14/2015    GLUF 103 (H) 01/13/2021    CALCIUM 9 4 01/13/2021    AST 17 01/13/2021    ALT 34 01/13/2021    ALKPHOS 88 01/13/2021    PROT 8 2 02/14/2015    BILITOT 0 5 02/14/2015    EGFR 108 01/13/2021           Review of Systems   All other systems reviewed and are negative  Active Problems:   Patient Active Problem List   Diagnosis    Nicotine dependence    Class 3 severe obesity due to excess calories with serious comorbidity and body mass index (BMI) of 40 0 to 44 9 in adult ([facility] [address]    Hypertension    Hyperlipidemia    Loud snoring    Lump    Obesity, morbid, BMI 40 0-49 9 ([facility] [address] [facility]    Essential hypertension    Abnormal CBC       Past Medical History:   Past Medical History:   Diagnosis Date    Hyperlipidemia     Hypertension     Nicotine dependence        Surgical History: History reviewed  No pertinent surgical history  Family History:    Family History   Problem Relation Age of Onset    Arthritis Mother     Hyperlipidemia Mother     Hypertension Mother     Kidney failure Mother     Other Father     Diabetes Father     Hypertension Father     Hypertension Sister        Cancer-related family history is not on file      Social History:   Social History     Socioeconomic History    Marital status: Single     Spouse name: Not on file    Number of children: 1    Years of education: Not on file    Highest education level: Not on file   Occupational History    Not on file   Social Needs    Financial resource strain: Not on file    Food insecurity     Worry: Not on file     Inability: Not on file    Transportation needs     Medical: Not on file     Non-medical: Not on file   Tobacco Use    Smoking status: Current Some Day Smoker     Types: Cigars    Smokeless tobacco: Never Used   Substance and Sexual Activity    Alcohol use: Yes     Comment: social alcohol use    Drug use: No    Sexual activity: Yes     Partners: Female     Birth control/protection: Female Sterilization   Lifestyle    Physical activity     Days per week: Not on file     Minutes per session: Not on file    Stress: Not on file   Relationships    Social connections     Talks on phone: Not on file     Gets together: Not on file     Attends Holiness service: Not on file     Active member of club or organization: Not on file     Attends meetings of clubs or organizations: Not on file     Relationship status: Not on file    Intimate partner violence     Fear of current or ex partner: Not on file     Emotionally abused: Not on file     Physically abused: Not on file     Forced sexual activity: Not on file   Other Topics Concern    Not on file   Social History Narrative    Caffeine use    [facility]ital history - Currently  - [facility]    Parentage    Sedentary lifestyle    Working full time        Current Medications:   Current Outpatient Medications   Medication Sig Dispense Refill    amLODIPine (NORVASC) 10 mg tablet TAKE 1 TABLET BY MOUTH EVERY DAY 30 tablet 1    atorvastatin (LIPITOR) 20 mg tablet Take 1 tablet (20 mg total) by mouth daily 90 tablet 0    cloNIDine (CATAPRES) 0 1 mg tablet Take 2 tablets (0 2 mg total) by mouth every 12 (twelve) hours 120 tablet 1    hydrochlorothiazide (HYDRODIURIL) 12 5 mg tablet Take 1 tablet (12 5 mg total) by mouth daily 90 tablet 0    losartan (COZAAR) 100 MG tablet Take 1 tablet (100 mg total) by mouth daily 90 tablet 0    ferrous sulfate 324 (65 Fe) mg Take 1 tablet (324 mg total) by mouth daily before breakfast 90 tablet 1     No current facility-administered medications for this visit  Allergies: Allergies   Allergen Reactions    Aspirin Rash       Physical Exam:  BP (!) 172/118 (BP Location: Right arm)   Pulse (!) 107   Temp 97 9 °F (36 6 °C) (Temporal)   Resp 16   Ht 5' 7" (1 702 m)   Wt 113 kg (250 lb)   SpO2 99%   BMI 39 16 kg/m²   Body surface area is 2 22 meters squared  Wt Readings from Last 3 Encounters:   02/11/21 113 kg (250 lb)   01/12/21 116 kg (256 lb)   12/24/20 116 kg (256 lb)           Physical Exam  Vitals signs reviewed  Constitutional:       General: He is not in acute distress  Appearance: He is well-developed  He is not diaphoretic  HENT:      Head: Normocephalic and atraumatic  Mouth/Throat:      Pharynx: No oropharyngeal exudate  Eyes:      General: No scleral icterus  Pupils: Pupils are equal, round, and reactive to light  Neck:      Musculoskeletal: Normal range of motion and neck supple  Cardiovascular:      Rate and Rhythm: Normal rate and regular rhythm  Heart sounds: No murmur  Pulmonary:      Effort: Pulmonary effort is normal  No respiratory distress  Breath sounds: Normal breath sounds  Abdominal:      General: Bowel sounds are normal  There is no distension  Palpations: Abdomen is soft  There is no mass  Tenderness: There is no abdominal tenderness  Musculoskeletal: Normal range of motion  Lymphadenopathy:      Cervical: No cervical adenopathy  Skin:     General: Skin is warm and dry  Neurological:      General: No focal deficit present  Mental Status: He is alert and oriented to person, place, and time  Psychiatric:         Behavior: Behavior normal          Thought Content: Thought content normal          Judgment: Judgment normal          Assessment / Plan:    1  Abnormal CBC    2   Iron deficiency anemia, unspecified iron deficiency anemia type    The patient is a very pleasant 57-year-old gentleman who was referred to Hematology for abnormal CBC that was picked up on routine blood work  Patient was noted to have some very mild leukocytosis with an elevated hematocrit  Normal differential   Patient does have a long history of smoking, he is overweight and also suffers from hypertension  Possible etiologies included primary versus secondary polycythemia  A myeloproliferative work up was done and his MANISHA 2 negative, PCR BCR/ABL negative  His most recent CBC demonstrated a normal white count 10 10, normal hemoglobin 15 8, Hematocrit 50 4   MCV remains low 76, RDW 16 5  Platelets are still mildly elevated 395  This could represent a subclinical iron deficiency  I will start him on a trial of therapeutic oral iron  I recommended he take oral iron daily with vitamin-C to help with absorption  I will recheck his blood work in 3 months  If his MCV and iron panel improves this would  Support mildly iron deficient theory  And I will then refer him to GI for workup to determine causative nature of iron loss  He will return for a follow-up visit in 3 months with repeat blood work  I have recommended he have abdominal ultrasound  Completed as requested to evaluate for hepatosplenomegaly and possible nephroma  Patient verbalized understanding and was in agreement with plan of care  Barriers to care: None  Patient able to self care  Portions of the record may have been created with voice recognition software  Occasional wrong word or "sound a like" substitutions may have occurred due to the inherent limitations of voice recognition software  Read the chart carefully and recognize, using context, where substitutions have occurred

## 2021-02-11 ENCOUNTER — TRANSCRIBE ORDERS (OUTPATIENT)
Dept: LAB | Facility: CLINIC | Age: 46
End: 2021-02-11

## 2021-02-11 ENCOUNTER — OFFICE VISIT (OUTPATIENT)
Dept: HEMATOLOGY ONCOLOGY | Facility: CLINIC | Age: 46
End: 2021-02-11

## 2021-02-11 ENCOUNTER — APPOINTMENT (OUTPATIENT)
Dept: LAB | Facility: CLINIC | Age: 46
End: 2021-02-11

## 2021-02-11 VITALS
TEMPERATURE: 97.9 F | OXYGEN SATURATION: 99 % | SYSTOLIC BLOOD PRESSURE: 172 MMHG | RESPIRATION RATE: 16 BRPM | HEART RATE: 107 BPM | WEIGHT: 250 LBS | HEIGHT: 67 IN | BODY MASS INDEX: 39.24 KG/M2 | DIASTOLIC BLOOD PRESSURE: 118 MMHG

## 2021-02-11 DIAGNOSIS — R79.89 ABNORMAL CBC: Primary | ICD-10-CM

## 2021-02-11 DIAGNOSIS — E87.6 HYPOPOTASSEMIA: ICD-10-CM

## 2021-02-11 DIAGNOSIS — D50.9 IRON DEFICIENCY ANEMIA, UNSPECIFIED IRON DEFICIENCY ANEMIA TYPE: ICD-10-CM

## 2021-02-11 DIAGNOSIS — E87.6 HYPOPOTASSEMIA: Primary | ICD-10-CM

## 2021-02-11 LAB
ALBUMIN SERPL BCP-MCNC: 3.8 G/DL (ref 3.5–5)
ALP SERPL-CCNC: 86 U/L (ref 46–116)
ALT SERPL W P-5'-P-CCNC: 33 U/L (ref 12–78)
ANION GAP SERPL CALCULATED.3IONS-SCNC: 6 MMOL/L (ref 4–13)
AST SERPL W P-5'-P-CCNC: 17 U/L (ref 5–45)
BILIRUB SERPL-MCNC: 0.41 MG/DL (ref 0.2–1)
BUN SERPL-MCNC: 15 MG/DL (ref 5–25)
CALCIUM SERPL-MCNC: 9.6 MG/DL (ref 8.3–10.1)
CHLORIDE SERPL-SCNC: 106 MMOL/L (ref 100–108)
CO2 SERPL-SCNC: 30 MMOL/L (ref 21–32)
CREAT SERPL-MCNC: 0.92 MG/DL (ref 0.6–1.3)
GFR SERPL CREATININE-BSD FRML MDRD: 102 ML/MIN/1.73SQ M
GLUCOSE P FAST SERPL-MCNC: 93 MG/DL (ref 65–99)
POTASSIUM SERPL-SCNC: 4.7 MMOL/L (ref 3.5–5.3)
PROT SERPL-MCNC: 7.8 G/DL (ref 6.4–8.2)
SODIUM SERPL-SCNC: 142 MMOL/L (ref 136–145)

## 2021-02-11 PROCEDURE — 36415 COLL VENOUS BLD VENIPUNCTURE: CPT

## 2021-02-11 PROCEDURE — 99214 OFFICE O/P EST MOD 30 MIN: CPT | Performed by: NURSE PRACTITIONER

## 2021-02-11 PROCEDURE — 80053 COMPREHEN METABOLIC PANEL: CPT

## 2021-02-11 RX ORDER — FERROUS SULFATE TAB EC 324 MG (65 MG FE EQUIVALENT) 324 (65 FE) MG
324 TABLET DELAYED RESPONSE ORAL
Qty: 90 TABLET | Refills: 1 | Status: SHIPPED | OUTPATIENT
Start: 2021-02-11 | End: 2022-04-25

## 2021-02-13 DIAGNOSIS — I10 ESSENTIAL HYPERTENSION: ICD-10-CM

## 2021-02-15 RX ORDER — CLONIDINE HYDROCHLORIDE 0.1 MG/1
0.2 TABLET ORAL EVERY 12 HOURS SCHEDULED
Qty: 120 TABLET | Refills: 1 | Status: SHIPPED | OUTPATIENT
Start: 2021-02-15 | End: 2021-03-17

## 2021-03-01 DIAGNOSIS — I10 ESSENTIAL HYPERTENSION: ICD-10-CM

## 2021-03-01 RX ORDER — AMLODIPINE BESYLATE 10 MG/1
TABLET ORAL
Qty: 30 TABLET | Refills: 1 | Status: SHIPPED | OUTPATIENT
Start: 2021-03-01 | End: 2021-03-29

## 2021-03-16 DIAGNOSIS — I10 ESSENTIAL HYPERTENSION: ICD-10-CM

## 2021-03-17 RX ORDER — CLONIDINE HYDROCHLORIDE 0.1 MG/1
0.2 TABLET ORAL EVERY 12 HOURS SCHEDULED
Qty: 120 TABLET | Refills: 0 | Status: SHIPPED | OUTPATIENT
Start: 2021-03-17 | End: 2021-04-14

## 2021-03-29 DIAGNOSIS — I10 ESSENTIAL HYPERTENSION: ICD-10-CM

## 2021-03-29 RX ORDER — AMLODIPINE BESYLATE 10 MG/1
TABLET ORAL
Qty: 30 TABLET | Refills: 1 | Status: SHIPPED | OUTPATIENT
Start: 2021-03-29 | End: 2022-04-25

## 2021-04-06 ENCOUNTER — OFFICE VISIT (OUTPATIENT)
Dept: FAMILY MEDICINE CLINIC | Facility: CLINIC | Age: 46
End: 2021-04-06

## 2021-04-06 VITALS
WEIGHT: 253.6 LBS | RESPIRATION RATE: 18 BRPM | SYSTOLIC BLOOD PRESSURE: 136 MMHG | HEIGHT: 67 IN | DIASTOLIC BLOOD PRESSURE: 84 MMHG | OXYGEN SATURATION: 98 % | HEART RATE: 92 BPM | BODY MASS INDEX: 39.8 KG/M2

## 2021-04-06 DIAGNOSIS — I10 ESSENTIAL HYPERTENSION: Primary | ICD-10-CM

## 2021-04-06 DIAGNOSIS — E78.5 HYPERLIPIDEMIA, UNSPECIFIED HYPERLIPIDEMIA TYPE: ICD-10-CM

## 2021-04-06 PROCEDURE — 99212 OFFICE O/P EST SF 10 MIN: CPT | Performed by: NURSE PRACTITIONER

## 2021-04-06 NOTE — PROGRESS NOTES
Assessment/Plan:      Diagnoses and all orders for this visit:    Essential hypertension  -     Lipid Panel with Direct LDL reflex; Future    /84  Patient reports that his BP is usually 110s-130s/70s at home  Continue current medications  Hyperlipidemia, unspecified hyperlipidemia type  -     Lipid Panel with Direct LDL reflex; Future    Continue atorvastatin  Lipid panel ordered  Patient instructed to follow-up in 2 months or sooner prn  Subjective:     Patient ID: Rich Batres is a 39 y o  male  Patient is here for a follow-up for HTN  Patient reports that he takes losartan 100mg daily, clonidine 0 2mg BID, amlodipine 10mg daily, and HCTZ 12 5 mg daily  Denies any chest pain, SOB, palpitations, dizziness, or Has  Patient reports that his blood pressure has been 110s-130s/70s at home  Patient is here for a follow-up for obesity and hyperlipidemia  Patient reports that he takes atorvastatin daily  Denies any side effects  Patient reports that he tries to watch his diet  Review of Systems   Constitutional: Negative for appetite change, chills and fever  HENT: Negative for congestion, ear pain, sinus pressure and sore throat  Respiratory: Negative for cough, chest tightness and shortness of breath  Cardiovascular: Negative for chest pain, palpitations and leg swelling  Gastrointestinal: Negative for abdominal pain, diarrhea, nausea and vomiting  Skin: Negative for rash  Neurological: Negative for dizziness, seizures, syncope, light-headedness and headaches  Psychiatric/Behavioral: Negative for suicidal ideas  Denies any depression  Objective:     Physical Exam  Vitals signs reviewed  Constitutional:       General: He is not in acute distress  Appearance: He is obese  He is not ill-appearing or diaphoretic     HENT:      Right Ear: External ear normal       Left Ear: External ear normal    Eyes:      Conjunctiva/sclera: Conjunctivae normal       Pupils: Pupils are equal, round, and reactive to light  Cardiovascular:      Rate and Rhythm: Normal rate and regular rhythm  Pulses: Normal pulses  Heart sounds: Normal heart sounds  Comments: No edema noted  Pulmonary:      Effort: Pulmonary effort is normal  No respiratory distress  Breath sounds: Normal breath sounds  No wheezing  Musculoskeletal:      Comments: Gait wnl  Skin:     Findings: No rash  Neurological:      Mental Status: He is alert and oriented to person, place, and time     Psychiatric:         Mood and Affect: Mood normal

## 2021-04-14 DIAGNOSIS — Z23 ENCOUNTER FOR IMMUNIZATION: ICD-10-CM

## 2021-04-14 DIAGNOSIS — I10 ESSENTIAL HYPERTENSION: ICD-10-CM

## 2021-04-14 RX ORDER — CLONIDINE HYDROCHLORIDE 0.1 MG/1
0.2 TABLET ORAL EVERY 12 HOURS SCHEDULED
Qty: 120 TABLET | Refills: 2 | Status: SHIPPED | OUTPATIENT
Start: 2021-04-14 | End: 2021-07-16

## 2021-04-29 ENCOUNTER — IMMUNIZATIONS (OUTPATIENT)
Dept: FAMILY MEDICINE CLINIC | Facility: HOSPITAL | Age: 46
End: 2021-04-29

## 2021-04-29 DIAGNOSIS — Z23 ENCOUNTER FOR IMMUNIZATION: Primary | ICD-10-CM

## 2021-04-29 PROCEDURE — 91300 SARS-COV-2 / COVID-19 MRNA VACCINE (PFIZER-BIONTECH) 30 MCG: CPT

## 2021-04-29 PROCEDURE — 0001A SARS-COV-2 / COVID-19 MRNA VACCINE (PFIZER-BIONTECH) 30 MCG: CPT

## 2021-05-23 ENCOUNTER — IMMUNIZATIONS (OUTPATIENT)
Dept: FAMILY MEDICINE CLINIC | Facility: HOSPITAL | Age: 46
End: 2021-05-23

## 2021-05-23 DIAGNOSIS — Z23 ENCOUNTER FOR IMMUNIZATION: Primary | ICD-10-CM

## 2021-05-23 PROCEDURE — 0002A SARS-COV-2 / COVID-19 MRNA VACCINE (PFIZER-BIONTECH) 30 MCG: CPT

## 2021-05-23 PROCEDURE — 91300 SARS-COV-2 / COVID-19 MRNA VACCINE (PFIZER-BIONTECH) 30 MCG: CPT

## 2021-05-28 ENCOUNTER — TELEPHONE (OUTPATIENT)
Dept: HEMATOLOGY ONCOLOGY | Facility: CLINIC | Age: 46
End: 2021-05-28

## 2021-05-28 NOTE — TELEPHONE ENCOUNTER
Left message for patient letting him know his labwork that was ordered from previous appointment needs to be completed for his follow up 6/1 at 830 with Isaac Muse 80 otherwise appointment will need to be rescheduled  hopeline number left for callback, please reschedule patient if labs were not done

## 2021-06-14 DIAGNOSIS — I10 ESSENTIAL HYPERTENSION: ICD-10-CM

## 2021-06-14 RX ORDER — HYDROCHLOROTHIAZIDE 12.5 MG/1
TABLET ORAL
Qty: 30 TABLET | Refills: 0 | Status: SHIPPED | OUTPATIENT
Start: 2021-06-14 | End: 2021-07-13

## 2021-07-13 DIAGNOSIS — I10 ESSENTIAL HYPERTENSION: ICD-10-CM

## 2021-07-13 RX ORDER — HYDROCHLOROTHIAZIDE 12.5 MG/1
TABLET ORAL
Qty: 30 TABLET | Refills: 0 | Status: SHIPPED | OUTPATIENT
Start: 2021-07-13 | End: 2021-08-16

## 2021-08-16 DIAGNOSIS — I10 ESSENTIAL HYPERTENSION: ICD-10-CM

## 2021-08-16 RX ORDER — HYDROCHLOROTHIAZIDE 12.5 MG/1
TABLET ORAL
Qty: 30 TABLET | Refills: 0 | Status: SHIPPED | OUTPATIENT
Start: 2021-08-16 | End: 2022-04-25

## 2022-04-25 ENCOUNTER — HOSPITAL ENCOUNTER (EMERGENCY)
Facility: HOSPITAL | Age: 47
Discharge: HOME | End: 2022-04-25
Attending: EMERGENCY MEDICINE

## 2022-04-25 ENCOUNTER — OFFICE VISIT (OUTPATIENT)
Dept: FAMILY MEDICINE CLINIC | Facility: CLINIC | Age: 47
End: 2022-04-25

## 2022-04-25 VITALS
RESPIRATION RATE: 16 BRPM | DIASTOLIC BLOOD PRESSURE: 112 MMHG | OXYGEN SATURATION: 98 % | HEART RATE: 120 BPM | BODY MASS INDEX: 39.39 KG/M2 | SYSTOLIC BLOOD PRESSURE: 210 MMHG | WEIGHT: 251 LBS | HEIGHT: 67 IN

## 2022-04-25 VITALS
RESPIRATION RATE: 18 BRPM | DIASTOLIC BLOOD PRESSURE: 89 MMHG | OXYGEN SATURATION: 99 % | TEMPERATURE: 98.4 F | HEART RATE: 96 BPM | SYSTOLIC BLOOD PRESSURE: 189 MMHG

## 2022-04-25 DIAGNOSIS — I10 ESSENTIAL HYPERTENSION: Primary | ICD-10-CM

## 2022-04-25 DIAGNOSIS — I10 ESSENTIAL HYPERTENSION: ICD-10-CM

## 2022-04-25 DIAGNOSIS — E78.5 HYPERLIPIDEMIA, UNSPECIFIED HYPERLIPIDEMIA TYPE: ICD-10-CM

## 2022-04-25 DIAGNOSIS — E66.9 OBESITY (BMI 30-39.9): ICD-10-CM

## 2022-04-25 DIAGNOSIS — R00.0 TACHYCARDIA: Primary | ICD-10-CM

## 2022-04-25 LAB
ALBUMIN SERPL BCP-MCNC: 3.8 G/DL (ref 3.5–5)
ALP SERPL-CCNC: 95 U/L (ref 46–116)
ALT SERPL W P-5'-P-CCNC: 27 U/L (ref 12–78)
ANION GAP SERPL CALCULATED.3IONS-SCNC: 10 MMOL/L (ref 4–13)
AST SERPL W P-5'-P-CCNC: 30 U/L (ref 5–45)
BASOPHILS # BLD AUTO: 0.12 THOUSANDS/ΜL (ref 0–0.1)
BASOPHILS NFR BLD AUTO: 1 % (ref 0–1)
BILIRUB SERPL-MCNC: 0.32 MG/DL (ref 0.2–1)
BUN SERPL-MCNC: 11 MG/DL (ref 5–25)
CALCIUM SERPL-MCNC: 9.1 MG/DL (ref 8.3–10.1)
CHLORIDE SERPL-SCNC: 105 MMOL/L (ref 100–108)
CO2 SERPL-SCNC: 26 MMOL/L (ref 21–32)
CREAT SERPL-MCNC: 1.07 MG/DL (ref 0.6–1.3)
EOSINOPHIL # BLD AUTO: 0.55 THOUSAND/ΜL (ref 0–0.61)
EOSINOPHIL NFR BLD AUTO: 4 % (ref 0–6)
ERYTHROCYTE [DISTWIDTH] IN BLOOD BY AUTOMATED COUNT: 17.7 % (ref 11.6–15.1)
GFR SERPL CREATININE-BSD FRML MDRD: 83 ML/MIN/1.73SQ M
GLUCOSE SERPL-MCNC: 119 MG/DL (ref 65–140)
HCT VFR BLD AUTO: 48.9 % (ref 36.5–49.3)
HGB BLD-MCNC: 16 G/DL (ref 12–17)
IMM GRANULOCYTES # BLD AUTO: 0.06 THOUSAND/UL (ref 0–0.2)
IMM GRANULOCYTES NFR BLD AUTO: 0 % (ref 0–2)
LYMPHOCYTES # BLD AUTO: 5.36 THOUSANDS/ΜL (ref 0.6–4.47)
LYMPHOCYTES NFR BLD AUTO: 39 % (ref 14–44)
MCH RBC QN AUTO: 24.9 PG (ref 26.8–34.3)
MCHC RBC AUTO-ENTMCNC: 32.7 G/DL (ref 31.4–37.4)
MCV RBC AUTO: 76 FL (ref 82–98)
MONOCYTES # BLD AUTO: 1.11 THOUSAND/ΜL (ref 0.17–1.22)
MONOCYTES NFR BLD AUTO: 8 % (ref 4–12)
NEUTROPHILS # BLD AUTO: 6.47 THOUSANDS/ΜL (ref 1.85–7.62)
NEUTS SEG NFR BLD AUTO: 48 % (ref 43–75)
NRBC BLD AUTO-RTO: 0 /100 WBCS
PLATELET # BLD AUTO: 440 THOUSANDS/UL (ref 149–390)
PMV BLD AUTO: 9.9 FL (ref 8.9–12.7)
POTASSIUM SERPL-SCNC: 3.7 MMOL/L (ref 3.5–5.3)
PROT SERPL-MCNC: 7.7 G/DL (ref 6.4–8.2)
RBC # BLD AUTO: 6.43 MILLION/UL (ref 3.88–5.62)
SODIUM SERPL-SCNC: 141 MMOL/L (ref 136–145)
WBC # BLD AUTO: 13.67 THOUSAND/UL (ref 4.31–10.16)

## 2022-04-25 PROCEDURE — 99285 EMERGENCY DEPT VISIT HI MDM: CPT | Performed by: EMERGENCY MEDICINE

## 2022-04-25 PROCEDURE — 36415 COLL VENOUS BLD VENIPUNCTURE: CPT

## 2022-04-25 PROCEDURE — 80053 COMPREHEN METABOLIC PANEL: CPT

## 2022-04-25 PROCEDURE — 93005 ELECTROCARDIOGRAM TRACING: CPT

## 2022-04-25 PROCEDURE — 99212 OFFICE O/P EST SF 10 MIN: CPT | Performed by: NURSE PRACTITIONER

## 2022-04-25 PROCEDURE — 99285 EMERGENCY DEPT VISIT HI MDM: CPT

## 2022-04-25 PROCEDURE — 85025 COMPLETE CBC W/AUTO DIFF WBC: CPT

## 2022-04-25 RX ORDER — LOSARTAN POTASSIUM 25 MG/1
25 TABLET ORAL ONCE
Status: COMPLETED | OUTPATIENT
Start: 2022-04-25 | End: 2022-04-25

## 2022-04-25 RX ORDER — LOSARTAN POTASSIUM 25 MG/1
25 TABLET ORAL DAILY
Qty: 30 TABLET | Refills: 0 | Status: SHIPPED | OUTPATIENT
Start: 2022-04-25 | End: 2022-05-09 | Stop reason: SDUPTHER

## 2022-04-25 RX ADMIN — LOSARTAN POTASSIUM 25 MG: 25 TABLET, FILM COATED ORAL at 23:19

## 2022-04-25 NOTE — PROGRESS NOTES
Assessment/Plan:      Diagnoses and all orders for this visit:    Tachycardia  -     Transfer to other facility    Essential hypertension  -     Transfer to other facility    Obesity (BMI 30-39 9)  -     Transfer to other facility    Hyperlipidemia, unspecified hyperlipidemia type  -     Transfer to other facility       Patient is here for a follow-up for HTN and hyperlipidemia  Patient was on losartan 100mg daily, clonidine 0 2mg BID, amlodipine 10mg daily, and HCTZ 12 5mg daily last year for HTN  Patient stopped taking his medications 6 months ago  Patient has not been to the office in a year  Patient does not have insurance  Denies any chest pain, SOB, palpitations, or Has  HR 120s  /120  EKG done and reviewed  Patient referred to [facility] for further evaluation and treatment  Subjective:     Patient ID: Blanche Both is a 42 y o  male  Patient is here for a follow-up for HTN  Patient reports that he has not taken his BP medications for the past 6 months  Denies any chest pain, SOB, dizziness, Has, or palpitations  Patient is here for a follow-up for hyperlipidemia  Patient reports that he has not taken the atorvastatin for the past 6 months  Patient was following up with hematology for iron deficiency anemia but has not seen hematology in the past year  Patient reports that he stopped his iron supplement  Review of Systems   Constitutional: Negative for chills and fever  HENT: Negative for congestion, ear pain, sinus pressure and sore throat  Respiratory: Negative for cough, chest tightness, shortness of breath and wheezing  Cardiovascular: Negative for chest pain and palpitations  Gastrointestinal: Negative for abdominal pain, blood in stool, diarrhea, nausea and vomiting  Genitourinary: Negative for dysuria, frequency and hematuria  Skin: Negative for rash     Neurological: Negative for dizziness, seizures, syncope, weakness, light-headedness and headaches  Objective:     Physical Exam  Vitals reviewed  Constitutional:       General: He is not in acute distress  Appearance: He is obese  He is not ill-appearing or diaphoretic  HENT:      Right Ear: External ear normal       Left Ear: External ear normal       Mouth/Throat:      Mouth: Mucous membranes are moist       Pharynx: Oropharynx is clear  No posterior oropharyngeal erythema  Eyes:      Conjunctiva/sclera: Conjunctivae normal       Pupils: Pupils are equal, round, and reactive to light  Cardiovascular:      Rate and Rhythm: Regular rhythm  Tachycardia present  Pulses: Normal pulses  Heart sounds: Normal heart sounds  Pulmonary:      Effort: Pulmonary effort is normal  No respiratory distress  Breath sounds: Normal breath sounds  No wheezing  Musculoskeletal:      Comments: Gait wnl  Skin:     Findings: No rash  Neurological:      Mental Status: He is alert and oriented to person, place, and time  Cranial Nerves: No cranial nerve deficit     Psychiatric:         Mood and Affect: Mood normal

## 2022-04-26 DIAGNOSIS — R00.0 SINUS TACHYCARDIA: Primary | ICD-10-CM

## 2022-04-26 LAB
ATRIAL RATE: 112 BPM
P AXIS: 68 DEGREES
PR INTERVAL: 156 MS
QRS AXIS: 68 DEGREES
QRSD INTERVAL: 126 MS
QT INTERVAL: 340 MS
QTC INTERVAL: 464 MS
T WAVE AXIS: 56 DEGREES
VENTRICULAR RATE: 112 BPM

## 2022-04-26 PROCEDURE — 93010 ELECTROCARDIOGRAM REPORT: CPT | Performed by: INTERNAL MEDICINE

## 2022-04-26 NOTE — ED ATTENDING ATTESTATION
4/25/2022  I, [place]ot, MD, saw and evaluated the patient  I have discussed the patient with the resident/non-physician practitioner and agree with the resident's/non-physician practitioner's findings, Plan of Care, and MDM as documented in the resident's/non-physician practitioner's note, except where noted  All available labs and Radiology studies were reviewed  I was present for key portions of any procedure(s) performed by the resident/non-physician practitioner and I was immediately available to provide assistance  At this point I agree with the current assessment done in the Emergency Department  I have conducted an independent evaluation of this patient a history and physical is as follows:    51-year-old male with history of hypertension referred to ED by PCP for evaluation of significant hypertension today  He reports he had gone for regular checkup, to get refills on antihypertensives into to the degree of hypertension he was sent for ED evaluation  He denies any headaches, visual changes, nausea, neck pain, chest pain, shortness of breath, palpitations, abdominal pain  EKG unremarkable  Lab work within normal limits  Patient had previously been taking losartan, amlodipine, HCTZ    Will restart losartan here tonight and have him follow up with PCP for continuing his other medications    ED Course         Critical Care Time  Procedures

## 2022-04-26 NOTE — ED PROVIDER NOTES
History  Chief Complaint   Patient presents with    Rapid Heart Rate     Pt arrives c/o tachycardia and HTN by urgent care  Was seen today for routine visit, denies SOB or CP  Has not had his meds in 6+ months  Mr Alejandra Quiroz is a 42 yom with a history of hypertension sent to the ED by urgent care for asymptomatic HTN  States that he took himself off of his home blood pressure medications six months ago, went to urgent care for medication refills today because his home BP has been increasingly elevated over the past few weeks  Denies lightheadedness, blurred vision, stroke-like symptoms, chest pain, shortness of breath, or any other complaints  Denies recent illness  No cardiac history  None       Past Medical History:   Diagnosis Date    Hyperlipidemia     Hypertension     Nicotine dependence        History reviewed  No pertinent surgical history  Family History   Problem Relation Age of Onset    Arthritis Mother     Hyperlipidemia Mother     Hypertension Mother     Kidney failure Mother     Other Father     Diabetes Father     Hypertension Father     Hypertension Sister      I have reviewed and agree with the history as documented  E-Cigarette/Vaping     E-Cigarette/Vaping Substances     Social History     Tobacco Use    Smoking status: Current Some Day Smoker     Types: Cigars    Smokeless tobacco: Never Used   Substance Use Topics    Alcohol use: Yes     Comment: social alcohol use    Drug use: No        Review of Systems   Constitutional: Negative  Negative for activity change, appetite change, chills, diaphoresis, fatigue, fever and unexpected weight change  HENT: Negative  Negative for ear pain, tinnitus, trouble swallowing and voice change  Eyes: Negative  Negative for photophobia, pain and visual disturbance  Respiratory: Negative  Negative for cough and shortness of breath  Cardiovascular: Negative  Negative for chest pain, palpitations and leg swelling  Gastrointestinal: Negative  Negative for abdominal distention, abdominal pain, blood in stool, nausea and vomiting  Endocrine: Negative  Genitourinary: Negative  Negative for decreased urine volume, difficulty urinating, dysuria and hematuria  Musculoskeletal: Negative  Negative for arthralgias, back pain and myalgias  Skin: Negative  Negative for color change, pallor, rash and wound  Allergic/Immunologic: Negative  Neurological: Negative  Negative for dizziness, syncope, facial asymmetry, speech difficulty, weakness, light-headedness, numbness and headaches  Hematological: Negative  Does not bruise/bleed easily  Psychiatric/Behavioral: Negative  Negative for confusion  All other systems reviewed and are negative  Physical Exam  ED Triage Vitals   Temperature Pulse Respirations Blood Pressure SpO2   04/25/22 2037 04/25/22 2037 04/25/22 2037 04/25/22 2037 04/25/22 2037   98 4 °F (36 9 °C) (!) 118 18 (S) (!) 229/123 100 %      Temp Source Heart Rate Source Patient Position - Orthostatic VS BP Location FiO2 (%)   04/25/22 2037 04/25/22 2100 04/25/22 2100 04/25/22 2100 --   Oral Monitor Lying Right arm       Pain Score       --                    Orthostatic Vital Signs  Vitals:    04/25/22 2037 04/25/22 2100 04/25/22 2130   BP: (S) (!) 229/123 (!) 201/108 (!) 189/89   Pulse: (!) 118 104 96   Patient Position - Orthostatic VS:  Lying Lying       Physical Exam  Vitals and nursing note reviewed  Constitutional:       General: He is not in acute distress  Appearance: Normal appearance  He is not ill-appearing or diaphoretic  HENT:      Head: Normocephalic and atraumatic  Right Ear: External ear normal       Left Ear: External ear normal       Nose: Nose normal       Mouth/Throat:      Mouth: Mucous membranes are moist       Pharynx: Oropharynx is clear  Eyes:      General: No scleral icterus  Right eye: No discharge  Left eye: No discharge        Extraocular Movements: Extraocular movements intact  Conjunctiva/sclera: Conjunctivae normal       Pupils: Pupils are equal, round, and reactive to light  Cardiovascular:      Rate and Rhythm: Regular rhythm  Tachycardia present  Pulses: Normal pulses  Heart sounds: Normal heart sounds  No murmur heard  No friction rub  No gallop  Pulmonary:      Effort: Pulmonary effort is normal  No respiratory distress  Breath sounds: Normal breath sounds  No wheezing, rhonchi or rales  Abdominal:      General: Abdomen is flat  Bowel sounds are normal  There is no distension  Palpations: Abdomen is soft  There is no mass  Tenderness: There is no abdominal tenderness  There is no guarding or rebound  Musculoskeletal:         General: No swelling or tenderness  Normal range of motion  Cervical back: Normal range of motion and neck supple  No rigidity or tenderness  Right lower leg: No edema  Left lower leg: No edema  Lymphadenopathy:      Cervical: No cervical adenopathy  Skin:     General: Skin is warm and dry  Capillary Refill: Capillary refill takes less than 2 seconds  Coloration: Skin is not jaundiced or pale  Findings: No bruising, erythema or rash  Neurological:      General: No focal deficit present  Mental Status: He is alert and oriented to person, place, and time  Cranial Nerves: No cranial nerve deficit  Sensory: No sensory deficit  Motor: No weakness        Coordination: Coordination normal    Psychiatric:         Mood and Affect: Mood normal          Behavior: Behavior normal          ED Medications  Medications   losartan (COZAAR) tablet 25 mg (25 mg Oral Given 4/25/22 8829)       Diagnostic Studies  Results Reviewed     Procedure Component Value Units Date/Time    Comprehensive metabolic panel [[id number]] Collected: 04/25/22 2100    Lab Status: Final result Specimen: Blood from Arm, Right Updated: 04/25/22 2300     Sodium 141 mmol/L Potassium 3 7 mmol/L      Chloride 105 mmol/L      CO2 26 mmol/L      ANION GAP 10 mmol/L      BUN 11 mg/dL      Creatinine 1 07 mg/dL      Glucose 119 mg/dL      Calcium 9 1 mg/dL      AST 30 U/L      ALT 27 U/L      Alkaline Phosphatase 95 U/L      Total Protein 7 7 g/dL      Albumin 3 8 g/dL      Total Bilirubin 0 32 mg/dL      eGFR 83 ml/min/1 73sq m     Narrative:      [employer] Kid[employer] guidelines for Chronic Kidney Disease (CKD):     Stage 1 with normal or high GFR (GFR > 90 mL/min/1 73 square meters)    Stage 2 Mild CKD (GFR = 60-89 mL/min/1 73 square meters)    Stage 3A Moderate CKD (GFR = 45-59 mL/min/1 73 square meters)    Stage 3B Moderate CKD (GFR = 30-44 mL/min/1 73 square meters)    Stage 4 Severe CKD (GFR = 15-29 mL/min/1 73 square meters)    Stage 5 End Stage CKD (GFR <15 mL/min/1 73 square meters)  Note: GFR calculation is accurate only with a steady state creatinine    CBC and differential [[id number]]  (Abnormal) Collected: 04/25/22 2100    Lab Status: Final result Specimen: Blood from Arm, Right Updated: 04/25/22 2226     WBC 13 67 Thousand/uL      RBC 6 43 Million/uL      Hemoglobin 16 0 g/dL      Hematocrit 48 9 %      MCV 76 fL      MCH 24 9 pg      MCHC 32 7 g/dL      RDW 17 7 %      MPV 9 9 fL      Platelets 180 Thousands/uL      nRBC 0 /100 WBCs      Neutrophils Relative 48 %      Immat GRANS % 0 %      Lymphocytes Relative 39 %      Monocytes Relative 8 %      Eosinophils Relative 4 %      Basophils Relative 1 %      Neutrophils Absolute 6 47 Thousands/µL      Immature Grans Absolute 0 06 Thousand/uL      Lymphocytes Absolute 5 36 Thousands/µL      Monocytes Absolute 1 11 Thousand/µL      Eosinophils Absolute 0 55 Thousand/µL      Basophils Absolute 0 12 Thousands/µL                  No orders to display         Procedures  ECG 12 Lead Documentation Only    Date/Time: 4/25/2022 6:54 PM  Performed by: Helen Lehman DO  Authorized by: French Hopper Uzair Fang DO     Indications / Diagnosis:  Asymptomatic hypertension  ECG reviewed by me, the ED Provider: yes    Patient location:  ED  Previous ECG:     Previous ECG:  Unavailable  Interpretation:     Interpretation: non-specific    Rate:     ECG rate:  111    ECG rate assessment: tachycardic    Rhythm:     Rhythm: sinus rhythm and sinus tachycardia    Ectopy:     Ectopy: none    QRS:     QRS axis:  Normal    QRS intervals:  Normal  Conduction:     Conduction: normal    ST segments:     ST segments:  Normal  T waves:     T waves: inverted      Inverted:  V2 and V1          ED Course                                       MDM  Number of Diagnoses or Management Options  Essential hypertension  Diagnosis management comments: Mr Lexi Perez is a 42 yom presenting from urgent care for asymptomatic hypertension and tachycardia  Per chart review, patient has longstanding history of mild tachycardia at rest, baseline   CBC, CMP, CXR unremarkable  Prescription for losartan given, to take BP daily, follow up with PCP in 1-2 weeks with log for medication adjustments if needed  Return precautions given  Stable, asymptomatic on discharge  Amount and/or Complexity of Data Reviewed  Clinical lab tests: ordered and reviewed  Discuss the patient with other providers: yes  Independent visualization of images, tracings, or specimens: yes        Disposition  Final diagnoses:   Essential hypertension     Time reflects when diagnosis was documented in both MDM as applicable and the Disposition within this note     Time User Action Codes Description Comment    4/25/2022 11:04 PM Andres, [address] [facility] Essential hypertension     4/25/2022 11:05 PM Andres, [facility] Essential hypertension       ED Disposition     ED Disposition Condition Date/Time Comment    Discharge Stable Mon Apr 25, 2022 11:04 PM Tamika Paige discharge to home/self care              Follow-up Information    None         Discharge Medication List as of 4/25/2022 11:07 PM      START taking these medications    Details   losartan (COZAAR) 25 mg tablet Take 1 tablet (25 mg total) by mouth daily, Starting Mon 4/25/2022, Until Wed 5/25/2022, Normal           No discharge procedures on file  PDMP Review     None           ED Provider  Attending physically available and evaluated [facility]  I managed the patient along with the ED Attending      Electronically Signed by         Beatriz Noland DO  04/29/22 9163

## 2022-05-09 ENCOUNTER — OFFICE VISIT (OUTPATIENT)
Dept: FAMILY MEDICINE CLINIC | Facility: CLINIC | Age: 47
End: 2022-05-09

## 2022-05-09 VITALS
DIASTOLIC BLOOD PRESSURE: 91 MMHG | OXYGEN SATURATION: 99 % | WEIGHT: 252 LBS | HEIGHT: 67 IN | BODY MASS INDEX: 39.55 KG/M2 | HEART RATE: 111 BPM | SYSTOLIC BLOOD PRESSURE: 144 MMHG | RESPIRATION RATE: 18 BRPM

## 2022-05-09 DIAGNOSIS — R94.31 ABNORMAL EKG: ICD-10-CM

## 2022-05-09 DIAGNOSIS — I10 ESSENTIAL HYPERTENSION: Primary | ICD-10-CM

## 2022-05-09 DIAGNOSIS — R79.89 ABNORMAL CBC: ICD-10-CM

## 2022-05-09 DIAGNOSIS — R00.0 TACHYCARDIA: ICD-10-CM

## 2022-05-09 PROCEDURE — 99212 OFFICE O/P EST SF 10 MIN: CPT | Performed by: NURSE PRACTITIONER

## 2022-05-09 RX ORDER — LOSARTAN POTASSIUM 25 MG/1
25 TABLET ORAL DAILY
Qty: 30 TABLET | Refills: 1 | Status: SHIPPED | OUTPATIENT
Start: 2022-05-09 | End: 2022-06-22 | Stop reason: SDUPTHER

## 2022-05-09 RX ORDER — METOPROLOL SUCCINATE 25 MG/1
25 TABLET, EXTENDED RELEASE ORAL DAILY
Qty: 30 TABLET | Refills: 1 | Status: SHIPPED | OUTPATIENT
Start: 2022-05-09 | End: 2022-06-22 | Stop reason: SDUPTHER

## 2022-05-09 NOTE — PROGRESS NOTES
Assessment/Plan:      Diagnoses and all orders for this visit:    Essential hypertension  -     CBC and differential; Future  -     TSH, 3rd generation with Free T4 reflex; Future  -     Lipid Panel with Direct LDL reflex; Future  -     Comprehensive metabolic panel; Future  -     metoprolol succinate (TOPROL-XL) 25 mg 24 hr tablet; Take 1 tablet (25 mg total) by mouth daily  -     losartan (COZAAR) 25 mg tablet; Take 1 tablet (25 mg total) by mouth daily    Patient went to the ED from our office on 4/25/22 due to HTN and tachycardia  EKG and lab work was done in the ED  Patient was discharged home and started on losartan 25mg daily  Patient's BP is 190s-220s/100s in the office today  Patient reports that his BP has been 140s-150s/90s at home  Patient took pictures of his BP readings and brought them along  Denies any chest pain, SOB, palpitations, dizziness, or Has  Continue losartan 25mg daily  Metoprolol 25mg daily prescribed  Medication information and side effects reviewed  Lab work ordered  Patient instructed to follow-up in 2 weeks for a BP check or sooner prn  Patient instructed to bring his BP cuff with him  Tachycardia  -     metoprolol succinate (TOPROL-XL) 25 mg 24 hr tablet; Take 1 tablet (25 mg total) by mouth daily    Patient's heart rate is 110s at the office  Patient reports that his HR is 90s-100s at home  Metoprolol 25mg daily prescribed  Medication information and side effects reviewed  Abnormal EKG  -     Ambulatory Referral to Cardiology; Future    EKG in the ED showed   Sinus tachycardia  Possible Left atrial enlargement  Non-specific intra-ventricular conduction block  When compared with ECG of 17-NOV-2013 18:54,  No significant change was found    Patient referred to cardiology for further evaluation  Abnormal CBC  -     CBC and differential; Future    WBC, RBC, and platelets were elevated in the ED  Repeat CBC ordered     Patient has seen hematology in the past    If repeat CBC is still abnormal will refer back to hematology  Patient instructed to follow-up in 2 weeks or sooner prn  Subjective:     Patient ID: Tamika Paige is a 42 y o  male  Patient is here for an ED follow-up for HTN and tachycardia  Patient went to the ED on 4/25/22 from our office due to HTN and tachycardia  Lab work and ekg was done in the ED  Patient was discharged home on losartan 25mg daily  Patient reports that his BP has been 140s-150s/90s at home  Patient reports that his heart rate has been 90s-100s at home  Denies any chest pain, SOB, palpitations, dizziness, or Has  Patient reports that he feels well  Review of Systems   Constitutional: Negative for chills and fever  HENT: Negative for congestion, ear pain, sinus pressure and sore throat  Respiratory: Negative for cough, chest tightness, shortness of breath and wheezing  Cardiovascular: Negative for chest pain, palpitations and leg swelling  Gastrointestinal: Negative for abdominal pain, diarrhea, nausea and vomiting  Skin: Negative for rash  Neurological: Negative for dizziness, seizures, syncope, light-headedness and headaches  Objective:     Physical Exam  Vitals reviewed  Constitutional:       General: He is not in acute distress  Appearance: He is obese  He is not ill-appearing or diaphoretic  HENT:      Right Ear: External ear normal       Left Ear: External ear normal       Mouth/Throat:      Mouth: Mucous membranes are moist       Pharynx: Oropharynx is clear  No posterior oropharyngeal erythema  Eyes:      Conjunctiva/sclera: Conjunctivae normal       Pupils: Pupils are equal, round, and reactive to light  Cardiovascular:      Rate and Rhythm: Regular rhythm  Tachycardia present  Pulses: Normal pulses  Heart sounds: Normal heart sounds  Comments: No edema noted  Pulmonary:      Effort: Pulmonary effort is normal  No respiratory distress  Breath sounds: Normal breath sounds  No wheezing  Musculoskeletal:      Comments: Gait wnl  Skin:     Findings: No rash  Neurological:      Mental Status: He is alert and oriented to person, place, and time     Psychiatric:         Mood and Affect: Mood normal

## 2022-05-16 ENCOUNTER — TELEPHONE (OUTPATIENT)
Dept: CARDIOLOGY CLINIC | Facility: CLINIC | Age: 47
End: 2022-05-16

## 2022-05-16 NOTE — TELEPHONE ENCOUNTER
Provided daughter with estimate on Friday 5/13  Mailed out copy of estimate along with business card with appt reminder today

## 2022-06-22 ENCOUNTER — OFFICE VISIT (OUTPATIENT)
Dept: FAMILY MEDICINE CLINIC | Facility: CLINIC | Age: 47
End: 2022-06-22

## 2022-06-22 VITALS
HEART RATE: 88 BPM | SYSTOLIC BLOOD PRESSURE: 164 MMHG | WEIGHT: 250 LBS | OXYGEN SATURATION: 98 % | DIASTOLIC BLOOD PRESSURE: 92 MMHG | BODY MASS INDEX: 39.24 KG/M2 | HEIGHT: 67 IN | RESPIRATION RATE: 16 BRPM

## 2022-06-22 DIAGNOSIS — I10 ESSENTIAL HYPERTENSION: Primary | ICD-10-CM

## 2022-06-22 DIAGNOSIS — R79.89 ABNORMAL CBC: ICD-10-CM

## 2022-06-22 DIAGNOSIS — E66.9 OBESITY (BMI 30-39.9): ICD-10-CM

## 2022-06-22 DIAGNOSIS — R00.0 TACHYCARDIA: ICD-10-CM

## 2022-06-22 PROCEDURE — 99212 OFFICE O/P EST SF 10 MIN: CPT | Performed by: NURSE PRACTITIONER

## 2022-06-22 RX ORDER — METOPROLOL SUCCINATE 25 MG/1
25 TABLET, EXTENDED RELEASE ORAL DAILY
Qty: 30 TABLET | Refills: 1 | Status: SHIPPED | OUTPATIENT
Start: 2022-06-22 | End: 2022-06-28

## 2022-06-22 RX ORDER — LOSARTAN POTASSIUM 50 MG/1
50 TABLET ORAL DAILY
Qty: 30 TABLET | Refills: 1 | Status: SHIPPED | OUTPATIENT
Start: 2022-06-22 | End: 2022-07-13 | Stop reason: SDUPTHER

## 2022-06-22 NOTE — PROGRESS NOTES
Assessment/Plan:      Diagnoses and all orders for this visit:    Essential hypertension  -     losartan (COZAAR) 50 mg tablet; Take 1 tablet (50 mg total) by mouth daily  -     metoprolol succinate (TOPROL-XL) 25 mg 24 hr tablet; Take 1 tablet (25 mg total) by mouth daily    /92  Patient reports that his BP is 140s-150s/80s-low 100s at home  Denies any chest pain, SOB, dizziness, Has, or palpitations  Continue metoprolol 25mg daily  Increased losartan to 50mg daily  Patient instructed to get his lab work done  Continue to monitor BP at home  Patient has a follow-up with cardiology next week  Tachycardia  -     metoprolol succinate (TOPROL-XL) 25 mg 24 hr tablet; Take 1 tablet (25 mg total) by mouth daily    HR 88  Continue metoprolol 25mg daily  Patient has a follow-up with cardiology next week  Obesity (BMI 30-39  9)  Patient instructed to eat a healthy low fat diet  Abnormal CBC  -     Ambulatory Referral to Hematology / Oncology; Future  Patient did not follow up with hematology for abnormal CBC  Patient did not get his lab work done yet  Patient instructed to get his repeat lab work done  Patient instructed to follow-up with hematology  Patient instructed to follow-up in 3 weeks or sooner prn  Subjective:     Patient ID: Nancy Packer is a 42 y o  male  Patient is here for a follow-up for HTN and tachycardia  Patient takes metoprolol 25mg daily and losartan 25mg daily  Patient reports that he usually takes his medications at lunch time  Patient reports that his BP has been 140s-150s/80s-low 100s at home  Denies any chest pain, Sob, palpitations, dizziness, Has, or vision changes  Patient reports that he has a follow-up with cardiology next week  Patient reports that he did not follow-up with hematology yet for abnormal CBC  Denies any fevers, fatigue, sore throat, earache, or cough  Patient reports that he did not get his lab work done yet  Review of Systems   Constitutional: Negative for chills, fatigue and fever  HENT: Negative for congestion, ear pain, sinus pressure and sore throat  Respiratory: Negative for cough, chest tightness, shortness of breath and wheezing  Cardiovascular: Negative for chest pain, palpitations and leg swelling  Gastrointestinal: Negative for abdominal pain, blood in stool, diarrhea, nausea and vomiting  Genitourinary: Negative for dysuria, frequency and hematuria  Skin: Negative for rash  Neurological: Negative for dizziness, seizures, syncope, light-headedness and headaches  Objective:     Physical Exam  Vitals reviewed  Constitutional:       General: He is not in acute distress  Appearance: He is obese  He is not ill-appearing or diaphoretic  HENT:      Right Ear: External ear normal       Left Ear: External ear normal       Mouth/Throat:      Mouth: Mucous membranes are moist       Pharynx: Oropharynx is clear  No posterior oropharyngeal erythema  Eyes:      Conjunctiva/sclera: Conjunctivae normal    Cardiovascular:      Rate and Rhythm: Normal rate and regular rhythm  Pulses: Normal pulses  Heart sounds: Normal heart sounds  Comments: No edema noted  Pulmonary:      Effort: Pulmonary effort is normal  No respiratory distress  Breath sounds: Normal breath sounds  No wheezing  Musculoskeletal:      Comments: Gait wnl  Skin:     Findings: No rash  Neurological:      Mental Status: He is alert and oriented to person, place, and time     Psychiatric:         Mood and Affect: Mood normal

## 2022-06-23 ENCOUNTER — APPOINTMENT (OUTPATIENT)
Dept: LAB | Facility: CLINIC | Age: 47
End: 2022-06-23
Payer: COMMERCIAL

## 2022-06-23 DIAGNOSIS — R79.89 ABNORMAL CBC: ICD-10-CM

## 2022-06-23 DIAGNOSIS — I10 ESSENTIAL HYPERTENSION: ICD-10-CM

## 2022-06-23 LAB
ALBUMIN SERPL BCP-MCNC: 4.3 G/DL (ref 3.5–5)
ALP SERPL-CCNC: 78 U/L (ref 34–104)
ALT SERPL W P-5'-P-CCNC: 23 U/L (ref 7–52)
ANION GAP SERPL CALCULATED.3IONS-SCNC: 5 MMOL/L (ref 4–13)
AST SERPL W P-5'-P-CCNC: 24 U/L (ref 13–39)
BASOPHILS # BLD AUTO: 0.13 THOUSANDS/ΜL (ref 0–0.1)
BASOPHILS NFR BLD AUTO: 1 % (ref 0–1)
BILIRUB SERPL-MCNC: 0.51 MG/DL (ref 0.2–1)
BUN SERPL-MCNC: 13 MG/DL (ref 5–25)
CALCIUM SERPL-MCNC: 9.7 MG/DL (ref 8.4–10.2)
CHLORIDE SERPL-SCNC: 105 MMOL/L (ref 96–108)
CHOLEST SERPL-MCNC: 270 MG/DL
CO2 SERPL-SCNC: 29 MMOL/L (ref 21–32)
CREAT SERPL-MCNC: 0.84 MG/DL (ref 0.6–1.3)
EOSINOPHIL # BLD AUTO: 0.53 THOUSAND/ΜL (ref 0–0.61)
EOSINOPHIL NFR BLD AUTO: 5 % (ref 0–6)
ERYTHROCYTE [DISTWIDTH] IN BLOOD BY AUTOMATED COUNT: 18 % (ref 11.6–15.1)
GFR SERPL CREATININE-BSD FRML MDRD: 106 ML/MIN/1.73SQ M
GLUCOSE P FAST SERPL-MCNC: 86 MG/DL (ref 65–99)
HCT VFR BLD AUTO: 53.8 % (ref 36.5–49.3)
HDLC SERPL-MCNC: 28 MG/DL
HGB BLD-MCNC: 16.5 G/DL (ref 12–17)
IMM GRANULOCYTES # BLD AUTO: 0.06 THOUSAND/UL (ref 0–0.2)
IMM GRANULOCYTES NFR BLD AUTO: 1 % (ref 0–2)
LDLC SERPL CALC-MCNC: 162 MG/DL (ref 0–100)
LYMPHOCYTES # BLD AUTO: 3.78 THOUSANDS/ΜL (ref 0.6–4.47)
LYMPHOCYTES NFR BLD AUTO: 33 % (ref 14–44)
MCH RBC QN AUTO: 23.8 PG (ref 26.8–34.3)
MCHC RBC AUTO-ENTMCNC: 30.7 G/DL (ref 31.4–37.4)
MCV RBC AUTO: 78 FL (ref 82–98)
MONOCYTES # BLD AUTO: 0.95 THOUSAND/ΜL (ref 0.17–1.22)
MONOCYTES NFR BLD AUTO: 8 % (ref 4–12)
NEUTROPHILS # BLD AUTO: 5.93 THOUSANDS/ΜL (ref 1.85–7.62)
NEUTS SEG NFR BLD AUTO: 52 % (ref 43–75)
NRBC BLD AUTO-RTO: 0 /100 WBCS
PLATELET # BLD AUTO: 413 THOUSANDS/UL (ref 149–390)
PMV BLD AUTO: 9.3 FL (ref 8.9–12.7)
POTASSIUM SERPL-SCNC: 4.5 MMOL/L (ref 3.5–5.3)
PROT SERPL-MCNC: 7.4 G/DL (ref 6.4–8.4)
RBC # BLD AUTO: 6.94 MILLION/UL (ref 3.88–5.62)
SODIUM SERPL-SCNC: 139 MMOL/L (ref 135–147)
TRIGL SERPL-MCNC: 400 MG/DL
TSH SERPL DL<=0.05 MIU/L-ACNC: 3.1 UIU/ML (ref 0.45–4.5)
WBC # BLD AUTO: 11.38 THOUSAND/UL (ref 4.31–10.16)

## 2022-06-23 PROCEDURE — 80061 LIPID PANEL: CPT

## 2022-06-23 PROCEDURE — 36415 COLL VENOUS BLD VENIPUNCTURE: CPT

## 2022-06-23 PROCEDURE — 84443 ASSAY THYROID STIM HORMONE: CPT

## 2022-06-23 PROCEDURE — 80053 COMPREHEN METABOLIC PANEL: CPT

## 2022-06-23 PROCEDURE — 85025 COMPLETE CBC W/AUTO DIFF WBC: CPT

## 2022-06-28 ENCOUNTER — CONSULT (OUTPATIENT)
Dept: CARDIOLOGY CLINIC | Facility: CLINIC | Age: 47
End: 2022-06-28

## 2022-06-28 VITALS
HEIGHT: 67 IN | DIASTOLIC BLOOD PRESSURE: 120 MMHG | BODY MASS INDEX: 39.46 KG/M2 | HEART RATE: 96 BPM | OXYGEN SATURATION: 98 % | SYSTOLIC BLOOD PRESSURE: 180 MMHG | TEMPERATURE: 98 F | WEIGHT: 251.4 LBS

## 2022-06-28 DIAGNOSIS — I10 ESSENTIAL HYPERTENSION: ICD-10-CM

## 2022-06-28 DIAGNOSIS — R94.31 ABNORMAL EKG: ICD-10-CM

## 2022-06-28 DIAGNOSIS — R00.0 TACHYCARDIA: ICD-10-CM

## 2022-06-28 DIAGNOSIS — E78.2 MIXED HYPERLIPIDEMIA: Primary | ICD-10-CM

## 2022-06-28 PROCEDURE — 93000 ELECTROCARDIOGRAM COMPLETE: CPT | Performed by: INTERNAL MEDICINE

## 2022-06-28 PROCEDURE — 99243 OFF/OP CNSLTJ NEW/EST LOW 30: CPT | Performed by: INTERNAL MEDICINE

## 2022-06-28 RX ORDER — CARVEDILOL 12.5 MG/1
12.5 TABLET ORAL 2 TIMES DAILY WITH MEALS
Qty: 60 TABLET | Refills: 3 | Status: SHIPPED | OUTPATIENT
Start: 2022-06-28 | End: 2022-07-20

## 2022-06-28 RX ORDER — HYDROCHLOROTHIAZIDE 25 MG/1
25 TABLET ORAL DAILY
Qty: 30 TABLET | Refills: 3 | Status: SHIPPED | OUTPATIENT
Start: 2022-06-28 | End: 2022-07-20

## 2022-06-28 NOTE — PATIENT INSTRUCTIONS
DASH Eating Plan   AMBULATORY CARE:   The DASH (Dietary Approaches to Stop Hypertension) Eating Plan  is designed to help prevent or lower high blood pressure  It can also help to lower LDL (bad) cholesterol and decrease your risk for heart disease  The plan is low in sodium, sugar, unhealthy fats, and total fat  It is high in potassium, calcium, magnesium, and fiber  These nutrients are added when you eat more fruits, vegetables, and whole grains  With the DASH eating plan, you need to eat a certain number of servings from each food group  This will help you get enough of certain nutrients and limit others  The amount of servings you should eat depends on how many calories you need  Your dietitian can help you create meal plans with the right number of servings for each food group  What you need to know about sodium:  Your dietitian will tell you how much sodium is safe for you to have each day  People with high blood pressure should have no more than 1,500 to 2,300 mg of sodium in a day  A teaspoon (tsp) of salt has 2,300 mg of sodium  This may seem like a difficult goal, but small changes to the foods you eat can make a big difference  Your healthcare provider or dietitian can help you create a meal plan that follows your sodium limit  Read food labels  Food labels can help you choose foods that are low in sodium  The amount of sodium is listed in milligrams (mg)  The % Daily Value (DV) column tells you how much of your daily needs are met by 1 serving of the food for each nutrient listed  Choose foods that have less than 5% of the DV of sodium  These foods are considered low in sodium  Foods that have 20% or more of the DV of sodium are considered high in sodium  Avoid foods that have more than 300 mg of sodium in each serving  Choose foods that say low-sodium, reduced-sodium, or no salt added on the food label  Limit added salt  Do not salt food at the table if you add salt when you cook   Use herbs and spices, such as onions, garlic, and salt-free seasonings to add flavor  Try lemon or lime juice or vinegar to add a tart flavor  Use hot peppers or a small amount of hot pepper sauce to add a spicy flavor  Limit foods high in added salt, such as the following:    Seasonings made with salt, such as garlic salt, celery salt, onion salt, seasoned salt, meat tenderizers, and monosodium glutamate (MSG)    Miso soup and canned or dried soup mixes    Regular soy sauce, barbecue sauce, teriyaki sauce, steak sauce, Worcestershire sauce, and most flavored vinegars    Snack foods, such as salted chips, popcorn, pretzels, pork rinds, salted crackers, and salted nuts    Frozen foods, such as dinners, entrees, vegetables with sauces, and breaded meats    Ask about salt substitutes  Ask your healthcare provider if you may use salt substitutes  Some salt substitutes have ingredients that can be harmful if you have certain health conditions  Choose foods carefully at restaurants  Meals from restaurants, especially fast food restaurants, are often high in sodium  Some restaurants have nutrition information that tells you the amount of sodium in their foods  Ask to have your food prepared with less, or no salt  What you need to know about fats:  Healthy fats include unsaturated fats and omega-3 fatty acids  Unhealthy fats include saturated fats and trans fats    Include healthy fats, such as the following:      Cooking oils, such as soybean, canola, olive, or sunflower    Fatty fish, such as salmon, tuna, mackerel, or sardines    Flaxseed oil or ground flaxseed    ½ cup of cooked beans, such as black beans, kidney beans, or rod beans    1½ ounces of low-sodium nuts, such as almonds or walnuts    Low-sugar, low-sodium peanut butter    Seeds such as criss seeds or sunflower seeds       Limit or do not have unhealthy fats, such as the following:      Foods that contain fat from animals, such as fatty meats, whole milk, butter, and cream    Shortening, stick margarine, palm oil, and coconut oil    Full-fat or creamy salad dressing    Creamy soup    Crackers, chips, and baked goods made with margarine or shortening    Foods that are fried in unhealthy fats    Gravy and sauces, such as Sim or cheese sauces    What you need to know about carbohydrates (carbs): All carbs break down into sugar  Complex carbs contain more fiber than simple carbs  This means complex carbs go into the bloodstream more slowly and cause less of a blood sugar spike  Try to include more complex carbs and fewer simple carbs  Include complex carbs, such as the followin slice of whole-grain bread    1 ounce of dry cereal that does not contain added sugar    ½ cup of cooked oatmeal    2 ounces of cooked whole-grain pasta    ½ cup of cooked brown rice    Limit or do not have simple carbs, such as the following:      [employer], such as doughnuts, pastries, and cookies    Mixes for cornbread and biscuits    White rice and pasta mixes, such as boxed macaroni and cheese    Instant and cold cereals that contain sugar    Jelly, jam, and ice cream that contain sugar    Condiments such as ketchup    Drinks high in sugar, such as soft drinks, lemonade, and fruit juice    What you need to know about vegetables and fruits:  Vegetables and fruits can be fresh, frozen, or canned  If possible, try to choose low-sodium canned options    Include a variety of vegetables and fruits, such as the followin medium apple, pear, or peach (about ½ cup chopped)    ½ small banana    ½ cup berries, such as blueberries, strawberries, or blackberries    1 cup of raw leafy greens, such as lettuce, spinach, kale, or gonsalo greens    ½ cup of frozen or canned (no added salt) vegetables, such as green beans    ½ cup of fresh, frozen, or canned fruit (canned in light syrup or fruit juice)    ½ cup of vegetable or fruit juice    Limit or do not have vegetables and fruits made in the following ways:      Frozen fruit such as cherries that have added sugar    Fruit in cream or butter sauce    Canned vegetables that are high in sodium    Sauerkraut, pickled vegetables, and other foods prepared in brine    Fried vegetables or vegetables in butter or high-fat sauces    What you need to know about protein foods: Include lean or low-fat protein foods, such as the following:      Poultry (chicken, turkey) with no skin    Fish (especially fatty fish, such as salmon, fresh tuna, or mackerel)    Lean beef and pork (loin, round, extra lean hamburger)    Egg whites and egg substitutes    1 cup of nonfat (skim) or 1% milk    1½ ounces of fat-free or low-fat cheese    6 ounces of nonfat or low-fat yogurt    Limit or do not have high-fat protein foods, such as the following:      Smoked or cured meat, such as corned beef, shankar, ham, hot dogs, and sausage    Canned beans and canned meats or spreads, such as potted meats, sardines, anchovies, and imitation seafood    Deli or lunch meats, such as bologna, ham, turkey, and roast beef    High-fat meat (T-bone steak, regular hamburger, and ribs)    Whole eggs and egg yolks    Whole milk, 2% milk, and cream    Regular cheese and processed cheese    Other guidelines to follow:   Maintain a healthy weight  Your risk for heart disease is higher if you are overweight  Your healthcare provider may suggest that you lose weight if you are overweight  You can lose weight by eating fewer calories and foods that have added sugars and fat  The DASH meal plan can help you do this  Decrease calories by eating smaller portions at each meal and fewer snacks  Ask your healthcare provider for more information about how to lose weight  Exercise regularly  Regular exercise can help you reach or maintain a healthy weight  Regular exercise can also help decrease your blood pressure and improve your cholesterol levels   Get 30 minutes or more of moderate exercise each day of the week  To lose weight, get at least 60 minutes of exercise  Talk to your healthcare provider about the best exercise program for you  Limit alcohol  Women should limit alcohol to 1 drink a day  Men should limit alcohol to 2 drinks a day  A drink of alcohol is 12 ounces of beer, 5 ounces of wine, or 1½ ounces of liquor  For more information:   [facility] [address]  [address] [address]  [place] [address]  Phone: [phone]  Web Address: [facility] no    © [facility] 2022 Information is for End User's use only and may not be sold, redistributed or otherwise used for commercial purposes  All illustrations and images included in CareNotes® are the copyrighted property of [employer]  or [employer]   The above information is an  only  It is not intended as medical advice for individual conditions or treatments  Talk to your doctor, nurse or pharmacist before following any medical regimen to see if it is safe and effective for you  Cholesterol and Your Health   AMBULATORY CARE:   Cholesterol  is a waxy, fat-like substance  Your body uses cholesterol to make hormones and new cells, and to protect nerves  Cholesterol is made by your body  It also comes from certain foods you eat, such as meat and dairy products  Your healthcare provider can help you set goals for your cholesterol levels  He or she can help you create a plan to meet your goals  Cholesterol level goals: Your cholesterol level goals depend on your risk for heart disease, your age, and your other health conditions  The following are general guidelines: Total cholesterol  includes low-density lipoprotein (LDL), high-density lipoprotein (HDL), and triglyceride levels  The total cholesterol level should be lower than 200 mg/dL and is best at about 150 mg/dL      LDL cholesterol  is called bad cholesterol because it forms plaque in your arteries  As plaque builds up, your arteries become narrow, and less blood flows through  When plaque decreases blood flow to your heart, you may have chest pain  If plaque completely blocks an artery that brings blood to your heart, you may have a heart attack  Plaque can break off and form blood clots  Blood clots may block arteries in your brain and cause a stroke  The level should be less than 130 mg/dL and is best at about 100 mg/dL  HDL cholesterol  is called good cholesterol  because it helps remove LDL cholesterol from your arteries  It does this by attaching to LDL cholesterol and carrying it to your liver  Your liver breaks down LDL cholesterol so your body can get rid of it  High levels of HDL cholesterol can help prevent a heart attack and stroke  Low levels of HDL cholesterol can increase your risk for heart disease, heart attack, and stroke  The level should be 60 mg/dL or higher  Triglycerides  are a type of fat that store energy from foods you eat  High levels of triglycerides also cause plaque buildup  This can increase your risk for a heart attack or stroke  If your triglyceride level is high, your LDL cholesterol level may also be high  The level should be less than 150 mg/dL  Any of the following can increase your risk for high cholesterol:   Smoking cigarettes    Being overweight or obese, or not getting enough exercise    Drinking large amounts of alcohol    A medical condition such as hypertension (high blood pressure) or diabetes    Certain genes passed from your parents to you    Age older than 65 years    What you need to know about having your cholesterol levels checked: Adults 21to 39years of age should have their cholesterol levels checked every 4 to 6 years  Adults 45 years or older should have their cholesterol checked every 1 to 2 years   You may need your cholesterol checked more often, or at a younger age, if you have risk factors for heart disease  You may also need to have your cholesterol checked more often if you have other health conditions, such as diabetes  Blood tests are used to check cholesterol levels  Blood tests measure your levels of triglycerides, LDL cholesterol, and HDL cholesterol  How healthy fats affect your cholesterol levels:  Healthy fats, also called unsaturated fats, help lower LDL cholesterol and triglyceride levels  Healthy fats include the following:  Monounsaturated fats  are found in foods such as olive oil, canola oil, avocado, nuts, and olives  Polyunsaturated fats,  such as omega 3 fats, are found in fish, such as salmon, trout, and tuna  They can also be found in plant foods such as flaxseed, walnuts, and soybeans  How unhealthy fats affect your cholesterol levels:  Unhealthy fats increase LDL cholesterol and triglyceride levels  They are found in foods high in cholesterol, saturated fat, and trans fat:  Cholesterol  is found in eggs, dairy, and meat  Saturated fat  is found in butter, cheese, ice cream, whole milk, and coconut oil  Saturated fat is also found in meat, such as sausage, hot dogs, and bologna  Trans fat  is found in liquid oils and is used in fried and baked foods  Foods that contain trans fats include chips, crackers, muffins, sweet rolls, microwave popcorn, and cookies  Treatment  for high cholesterol will also decrease your risk of heart disease, heart attack, and stroke  Treatment may include any of the following:  Lifestyle changes  may include food, exercise, weight loss, and quitting smoking  You may also need to decrease the amount of alcohol you drink  Your healthcare provider will want you to start with lifestyle changes  Other treatment may be added if lifestyle changes are not enough  Your healthcare provider may recommend you work with a team to manage hyperlipidemia   The team may include medical experts such as a dietitian, an exercise or physical therapist, and a behavior therapist  Your family members may be included in helping you create lifestyle changes  Medicines  may be given to lower your LDL cholesterol, triglyceride levels, or total cholesterol level  You may need medicines to lower your cholesterol if any of the following is true:    You have a history of stroke, TIA, unstable angina, or a heart attack  Your LDL cholesterol level is 190 mg/dL or higher  You are age 36 to 76 years, have diabetes or heart disease risk factors, and your LDL cholesterol is 70 mg/dL or higher  Supplements  include fish oil, red yeast rice, and garlic  Fish oil may help lower your triglyceride and LDL cholesterol levels  It may also increase your HDL cholesterol level  Red yeast rice may help decrease your total cholesterol level and LDL cholesterol level  Garlic may help lower your total cholesterol level  Do not take any supplements without talking to your healthcare provider  Food changes you can make to lower your cholesterol levels:  A dietitian can help you create a healthy eating plan  He or she can show you how to read food labels and choose foods low in saturated fat, trans fats, and cholesterol  Decrease the total amount of fat you eat  Choose lean meats, fat-free or 1% fat milk, and low-fat dairy products, such as yogurt and cheese  Try to limit or avoid red meats  Limit or do not eat fried foods or baked goods, such as cookies  Replace unhealthy fats with healthy fats  Cook foods in olive oil or canola oil  Choose soft margarines that are low in saturated fat and trans fat  Seeds, nuts, and avocados are other examples of healthy fats  Eat foods with omega-3 fats  Examples include salmon, tuna, mackerel, walnuts, and flaxseed  Eat fish 2 times per week  Pregnant women should not eat fish that have high levels of mercury, such as shark, swordfish, and rony mackerel  Increase the amount of high-fiber foods you eat    High-fiber foods can help lower your LDL cholesterol  Aim to get between 20 and 30 grams of fiber each day  Fruits and vegetables are high in fiber  Eat at least 5 servings each day  Other high-fiber foods are whole-grain or whole-wheat breads, pastas, or cereals, and brown rice  Eat 3 ounces of whole-grain foods each day  Increase fiber slowly  You may have abdominal discomfort, bloating, and gas if you add fiber to your diet too quickly  Eat healthy protein foods  Examples include low-fat dairy products, skinless chicken and turkey, fish, and nuts  Limit foods and drinks that are high in sugar  Your dietitian or healthcare provider can help you create daily limits for high-sugar foods and drinks  The limit may be lower if you have diabetes or another health condition  Limits can also help you lose weight if needed  Lifestyle changes you can make to lower your cholesterol levels:   Maintain a healthy weight  Ask your healthcare provider what a healthy weight is for you  Ask him or her to help you create a weight loss plan if needed  Weight loss can decrease your total cholesterol and triglyceride levels  Weight loss may also help keep your blood pressure at a healthy level  Be physically active throughout the day  Physical activity, such as exercise, can help lower your total cholesterol level and maintain a healthy weight  Physical activity can also help increase your HDL cholesterol level  Work with your healthcare provider to create an program that is right for you  Get at least 30 to 40 minutes of moderate physical activity most days of the week  Examples of exercise include brisk walking, swimming, or biking  Also include strength training at least 2 times each week  Your healthcare providers can help you create a physical activity plan  Do not smoke  Nicotine and other chemicals in cigarettes and cigars can raise your cholesterol levels   Ask your healthcare provider for information if you currently smoke and need help to quit  E-cigarettes or smokeless tobacco still contain nicotine  Talk to your healthcare provider before you use these products  Limit or do not drink alcohol  Alcohol can increase your triglyceride levels  Ask your healthcare provider before you drink alcohol  Ask how much is okay for you to drink in 24 hours or 1 week  Follow up with your doctor as directed:  Write down your questions so you remember to ask them during your visits  © Copyright "[employer] 2022 Information is for End User's use only and may not be sold, redistributed or otherwise used for commercial purposes  All illustrations and images included in CareNotes® are the copyrighted property of [employer] , [employer]  or [address] [place], [place] [address]pa[facility]  The above information is an  only  It is not intended as medical advice for individual conditions or treatments  Talk to your doctor, nurse or pharmacist before following any medical regimen to see if it is safe and effective for you

## 2022-06-28 NOTE — PROGRESS NOTES
Cardiology Consultation     Latosha Lai  [id number]  1975  [address] [facility] [address] [address] [phone]      1  Mixed hyperlipidemia  POCT ECG    Lipid panel    LDL cholesterol, direct   2  Essential hypertension  POCT ECG    hydrochlorothiazide (HYDRODIURIL) 25 mg tablet    carvedilol (COREG) 12 5 mg tablet    Echo complete w/ contrast if indicated   3  Tachycardia  POCT ECG    carvedilol (COREG) 12 5 mg tablet   4  Abnormal EKG  Ambulatory Referral to Cardiology    POCT ECG       Discussion/Summary:    He has had hypertension for quite some time  Long detailed discussion about it  He has been on several different medications in the past as noted below  Discussed sodium in his diet which probably is 1 of the key factors here  Very well could have sleep apnea  I suspect essential/primary hypertension, but given his young age we can consider secondary hypertension  His blood work does not suggest any sort of adrenal issue  CT scan in the past of his abdomen was not with contrast so cannot exclude renal artery stenosis  I will order an echocardiogram, although he has no insurance, so will need to be cognizant of the cost     Aside from lifestyle modification, we should make some medication adjustments  He was on a pretty hefty regimen previously and his blood pressure was repeated by me later in the visit and is quite high as noted  Given his high sodium diet, I think hydrochlorothiazide would be a good choice and I will put him on 25 mg  Toprol-XL is a very weak antihypertensive and I can change him to carvedilol  His heart rates have been high previously all in sinus tachycardia  He denies any palpitations  But for now, keep him on the beta-blocker  If possible the future, will re-evaluate any testing for sleep apnea  Increased aerobic activity      We spent most of the time talking about his blood pressure, but also reviewed his lipid panel  Give him some education about diet choices and a repeat fasting blood work in 3 months prior to my next visit with him at that point we can evaluate if he needs to be on any lipid-lowering therapy  In between now and the next visit with me, he will contact me with his blood pressure readings using my chart and I will make adjustments  History of Present Illness:    74-year-old man  He is referred to the office today for hypertension  Pretty longstanding diagnosis  In the past, he had been on several different antihypertensives including 100 mg of losartan, 100 mg twice a day of metoprolol tartrate, 12 5 mg hydrochlorothiazide  At 1 point, he was also on clonidine but he said this made him tired  Previously also on amlodipine  Drives a truck for living  Varying schedule and this leads to poor diet choices overall  High sodium diet  Eats on the go  Varying sleep cycle as well  He says he typically feels rested  He does snore  Never been tested for sleep apnea  Family history of hypertension, as well  He has been told he has been tachycardic and currently, he was recently restarted on his antihypertensives after being off of his meds for about 6 months  He was started on lower doses of his medications to start and these have been titrated up  Currently is on 50 mg of Cozaar 25 mg of Toprol-XL  He denies any chest pain, shortness of breath, PND, orthopnea, other limitations  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, as well  He has past these  He does tend to get anxious at doctor's visits and his blood pressure rises when he gets it there  He checks it at home once a day and on average says he gets in the 140s and 80s to 90s  However he does have spikes into the 180s over 120s as he is in the office today  The low that he will get is 130/80, so he is not hypotensive      He denies any palpitations and does not notice any tachycardia, though this diagnosis is noted previously  He had blood work done for primary care provider recently  His lipid panel is abnormal with elevated triglycerides of 400  HDL is low  LDL calculated was in the 160s  These are increased from January of 2021 when his blood work was last done  Patient Active Problem List   Diagnosis    Nicotine dependence    Class 3 severe obesity due to excess calories with serious comorbidity and body mass index (BMI) of 40 0 to 44 9 in adult ([facility] [address]    Hypertension    Hyperlipidemia    Loud snoring    Lump    Obesity, morbid, BMI 40 0-49 9 ([facility])    Essential hypertension    Abnormal CBC    Obesity (BMI 30-39  9)    Tachycardia    Abnormal EKG     Past Medical History:   Diagnosis Date    Hyperlipidemia     Hypertension     Nicotine dependence      Social History     Tobacco Use    Smoking status: Current Some Day Smoker     Types: Cigars    Smokeless tobacco: Never Used   Substance Use Topics    Alcohol use: Yes     Comment: social alcohol use    Drug use: No      Family History   Problem Relation Age of Onset    Arthritis Mother     Hyperlipidemia Mother     Hypertension Mother     Kidney failure Mother     Other Father     Diabetes Father     Hypertension Father     Hypertension Sister      No past surgical history on file      Current Outpatient Medications:     carvedilol (COREG) 12 5 mg tablet, Take 1 tablet (12 5 mg total) by mouth 2 (two) times a day with meals, Disp: 60 tablet, Rfl: 3    hydrochlorothiazide (HYDRODIURIL) 25 mg tablet, Take 1 tablet (25 mg total) by mouth daily, Disp: 30 tablet, Rfl: 3    losartan (COZAAR) 50 mg tablet, Take 1 tablet (50 mg total) by mouth daily, Disp: 30 tablet, Rfl: 1  Allergies   Allergen Reactions    Aspirin Rash       Vitals:    06/28/22 0845   BP: (!) 180/120   BP Location: Left arm   Patient Position: Sitting   Cuff Size: Large   Pulse: 96   Temp: 98 °F (36 7 °C)   TempSrc: Tympanic   SpO2: 98%   Weight: 114 kg (251 lb 6 4 oz)   Height: 5' 7" (1 702 m)     Vitals:    06/28/22 0845   Weight: 114 kg (251 lb 6 4 oz)      Height: 5' 7" (170 2 cm)   Body mass index is 39 37 kg/m²  Physical Exam:  GENERAL: Alert, well appearing, and in no distress  HEENT:  PERRL, EOMI, no scleral icterus, no conjunctival pallor  NECK:  Supple, No elevated JVP, no thyromegaly, no carotid bruits  HEART:  Regular rate and rhythm, normal S1/S2, no S3/S4, no murmur or rub  LUNGS:  Clear to auscultation bilaterally  ABDOMEN:  Obese  Soft, non-tender, positive bowel sounds, no rebound or guarding  EXTREMITIES:  No edema  VASCULAR:  Normal pedal pulses   NEURO: No focal deficits,  SKIN: Normal without suspicious lesions on exposed skin      ROS:  Except as noted in HPI, is otherwise reviewed in detail and a 12 point review of systems is negative  Labs:  Lab Results   Component Value Date    SODIUM 139 06/23/2022    K 4 5 06/23/2022     06/23/2022    CREATININE 0 84 06/23/2022    BUN 13 06/23/2022    CO2 29 06/23/2022    ALT 23 06/23/2022    AST 24 06/23/2022    GLUF 86 06/23/2022    HGBA1C 5 8 04/14/2019    WBC 11 38 (H) 06/23/2022    HGB 16 5 06/23/2022    HCT 53 8 (H) 06/23/2022     (H) 06/23/2022       Lab Results   Component Value Date    CHOL 231 02/14/2015    CHOL 252 12/06/2013     Lab Results   Component Value Date    HDL 28 (L) 06/23/2022    HDL 27 (L) 01/13/2021    HDL 25 (L) 11/16/2020     Lab Results   Component Value Date    LDLCALC 162 (H) 06/23/2022    LDLCALC 101 (H) 01/13/2021    LDLCALC 116 (H) 11/16/2020     Lab Results   Component Value Date    TRIG 400 (H) 06/23/2022    TRIG 227 (H) 01/13/2021    TRIG 191 (H) 11/16/2020       EKG:  Sinus rhythm, 88 beats per minute  Borderline IVCD

## 2022-06-29 DIAGNOSIS — E78.2 MIXED HYPERLIPIDEMIA: Primary | ICD-10-CM

## 2022-06-29 RX ORDER — ROSUVASTATIN CALCIUM 5 MG/1
5 TABLET, COATED ORAL DAILY
Qty: 30 TABLET | Refills: 1 | Status: SHIPPED | OUTPATIENT
Start: 2022-06-29 | End: 2022-07-13 | Stop reason: SDUPTHER

## 2022-07-02 ENCOUNTER — HOSPITAL ENCOUNTER (OUTPATIENT)
Dept: NON INVASIVE DIAGNOSTICS | Facility: HOSPITAL | Age: 47
Discharge: HOME | End: 2022-07-02
Attending: INTERNAL MEDICINE

## 2022-07-02 VITALS
HEART RATE: 80 BPM | SYSTOLIC BLOOD PRESSURE: 140 MMHG | HEIGHT: 67 IN | BODY MASS INDEX: 39.39 KG/M2 | DIASTOLIC BLOOD PRESSURE: 82 MMHG | WEIGHT: 251 LBS

## 2022-07-02 DIAGNOSIS — I10 ESSENTIAL HYPERTENSION: ICD-10-CM

## 2022-07-02 LAB
AORTIC ROOT: 3.6 CM
APICAL FOUR CHAMBER EJECTION FRACTION: 52 %
AV LVOT MEAN GRADIENT: 1 MMHG
AV LVOT PEAK GRADIENT: 2 MMHG
DOP CALC LVOT PEAK VEL VTI: 13.56 CM
DOP CALC LVOT PEAK VEL: 0.75 M/S
DOP CALC RVOT PEAK VEL: 0.98 M/S
DOP CALC RVOT VTI: 17.78 CM
FRACTIONAL SHORTENING: 29 (ref 28–44)
INTERVENTRICULAR SEPTUM IN DIASTOLE (PARASTERNAL SHORT AXIS VIEW): 1.2 CM
INTERVENTRICULAR SEPTUM: 1.2 CM (ref 0.6–1.1)
LAAS-AP4: 18.6 CM2
LEFT ATRIUM SIZE: 4.1 CM
LEFT INTERNAL DIMENSION IN SYSTOLE: 3.7 CM (ref 2.1–4)
LEFT VENTRICULAR INTERNAL DIMENSION IN DIASTOLE: 5.2 CM (ref 3.5–6)
LEFT VENTRICULAR POSTERIOR WALL IN END DIASTOLE: 1.2 CM
LEFT VENTRICULAR STROKE VOLUME: 71 ML
LVSV (TEICH): 71 ML
MV E'TISSUE VEL-SEP: 9 CM/S
RIGHT VENTRICLE ID DIMENSION: 2.9 CM
SL CV LV EF: 60
SL CV PED ECHO LEFT VENTRICLE DIASTOLIC VOLUME (MOD BIPLANE) 2D: 131 ML
SL CV PED ECHO LEFT VENTRICLE SYSTOLIC VOLUME (MOD BIPLANE) 2D: 60 ML
SL CV RVOT MAX GRADIENT: 4 MMHG
SL CV RVOT MEAN GRADIENT: 2 MMHG
SL CV RVOT VMEAN: 0.66 M/S

## 2022-07-02 PROCEDURE — 93306 TTE W/DOPPLER COMPLETE: CPT

## 2022-07-02 PROCEDURE — 93306 TTE W/DOPPLER COMPLETE: CPT | Performed by: INTERNAL MEDICINE

## 2022-07-13 ENCOUNTER — OFFICE VISIT (OUTPATIENT)
Dept: FAMILY MEDICINE CLINIC | Facility: CLINIC | Age: 47
End: 2022-07-13

## 2022-07-13 VITALS
SYSTOLIC BLOOD PRESSURE: 136 MMHG | WEIGHT: 251 LBS | HEART RATE: 86 BPM | DIASTOLIC BLOOD PRESSURE: 84 MMHG | HEIGHT: 67 IN | OXYGEN SATURATION: 97 % | BODY MASS INDEX: 39.39 KG/M2 | RESPIRATION RATE: 16 BRPM

## 2022-07-13 DIAGNOSIS — Z79.899 ENCOUNTER FOR LONG-TERM CURRENT USE OF MEDICATION: ICD-10-CM

## 2022-07-13 DIAGNOSIS — R79.89 ABNORMAL CBC: ICD-10-CM

## 2022-07-13 DIAGNOSIS — E78.2 MIXED HYPERLIPIDEMIA: ICD-10-CM

## 2022-07-13 DIAGNOSIS — E66.9 OBESITY (BMI 30-39.9): ICD-10-CM

## 2022-07-13 DIAGNOSIS — I10 ESSENTIAL HYPERTENSION: Primary | ICD-10-CM

## 2022-07-13 PROCEDURE — 99212 OFFICE O/P EST SF 10 MIN: CPT | Performed by: NURSE PRACTITIONER

## 2022-07-13 RX ORDER — ROSUVASTATIN CALCIUM 5 MG/1
5 TABLET, COATED ORAL DAILY
Qty: 30 TABLET | Refills: 2 | Status: SHIPPED | OUTPATIENT
Start: 2022-07-13 | End: 2022-08-25

## 2022-07-13 RX ORDER — LOSARTAN POTASSIUM 50 MG/1
50 TABLET ORAL DAILY
Qty: 30 TABLET | Refills: 2 | Status: SHIPPED | OUTPATIENT
Start: 2022-07-13 | End: 2022-10-13 | Stop reason: SDUPTHER

## 2022-07-13 NOTE — PROGRESS NOTES
Assessment/Plan:      Diagnoses and all orders for this visit:    Essential hypertension  -     Comprehensive metabolic panel; Future  -     losartan (COZAAR) 50 mg tablet; Take 1 tablet (50 mg total) by mouth daily    /84  Patient reports that his BP has been 110s-130s/70s-80s at home  Patient reports that his HR has been in the 80s at home  Denies any chest pain, SOB, palpitations, Has, or dizziness  Continue current medications  Continue to follow-up with cardiology  Mixed hyperlipidemia  -     rosuvastatin (CRESTOR) 5 mg tablet; Take 1 tablet (5 mg total) by mouth daily    Patient has a script for a lipid panel for hyperlipidemia  Continue crestor 5mg daily  Obesity (BMI 30-39  9)  Patient instructed to eat a healthy low fat diet  Abnormal CBC  Denies any fever, fatigue, nasal congestion, sore throat, cough, or earache  Patient instructed to make an appointment with hematology as soon as possible  Encounter for long-term current use of medication  -     Comprehensive metabolic panel; Future      Lab work ordered  Patient instructed to follow-up in 3 months or sooner prn  Subjective:     Patient ID: Aubrey Sierra is a 42 y o  male  Patient is here for a follow-up for chronic medical conditions  Patient reports that he feels good  Patient is here for a follow-up for HTN  Patient takes coreg 12 5mg BID, losartan 50mg daily, and HCTZ 25mg daily  Patient reports that his BP has been 110s-130s/70s-80s and HR has been 80s at home  Denies any chest pain, SOB, palpitations, dizziness, or Has  Patient followed up with cardiology for his BP  Patient is here for a follow-up for hyperlipidemia  Patient takes crestor 5mg daily  Denies any side effects  Patient is here for a follow-up for obesity  Patient reports that he tries to watch his diet  Patient reports that he did not make a follow-up visit with hematology yet for abnormal CBC   Denies any fever, fatigue, nasal congestion, sore throat, earache, cough, vomiting, or diarrhea  Review of Systems   Constitutional: Negative for chills, fatigue and fever  HENT: Negative for congestion, ear pain, sinus pressure, sore throat and trouble swallowing  Respiratory: Negative for cough, shortness of breath and wheezing  Cardiovascular: Negative for chest pain, palpitations and leg swelling  Gastrointestinal: Negative for abdominal pain, diarrhea, nausea and vomiting  Skin: Negative for rash  Neurological: Negative for dizziness, seizures, syncope, light-headedness and headaches  Objective:     Physical Exam  Vitals reviewed  Constitutional:       General: He is not in acute distress  Appearance: He is obese  He is not ill-appearing or diaphoretic  HENT:      Right Ear: External ear normal       Left Ear: External ear normal       Mouth/Throat:      Mouth: Mucous membranes are moist       Pharynx: Oropharynx is clear  No posterior oropharyngeal erythema  Eyes:      Conjunctiva/sclera: Conjunctivae normal    Cardiovascular:      Rate and Rhythm: Normal rate and regular rhythm  Pulses: Normal pulses  Heart sounds: Normal heart sounds  Comments: No edema noted  Pulmonary:      Effort: Pulmonary effort is normal  No respiratory distress  Breath sounds: Normal breath sounds  No wheezing  Musculoskeletal:      Comments: Gait wnl  Skin:     Findings: No rash  Neurological:      Mental Status: He is alert and oriented to person, place, and time     Psychiatric:         Mood and Affect: Mood normal

## 2022-07-20 DIAGNOSIS — R00.0 TACHYCARDIA: ICD-10-CM

## 2022-07-20 DIAGNOSIS — I10 ESSENTIAL HYPERTENSION: ICD-10-CM

## 2022-07-20 RX ORDER — CARVEDILOL 12.5 MG/1
TABLET ORAL
Qty: 180 TABLET | Refills: 2 | Status: SHIPPED | OUTPATIENT
Start: 2022-07-20

## 2022-07-20 RX ORDER — HYDROCHLOROTHIAZIDE 25 MG/1
25 TABLET ORAL DAILY
Qty: 90 TABLET | Refills: 2 | Status: SHIPPED | OUTPATIENT
Start: 2022-07-20

## 2022-09-12 ENCOUNTER — TELEPHONE (OUTPATIENT)
Dept: HEMATOLOGY ONCOLOGY | Facility: CLINIC | Age: 47
End: 2022-09-12

## 2022-09-12 NOTE — TELEPHONE ENCOUNTER
Scheduling Appointment SEND TO \[facility]\""    Who Is Calling to Schedule Daughter    Doctor Yvette Martin   Location [facility]   Date and Time 10/06 at 8:00am   Reason for scheduling appointment Follow up    Patient verbalized understanding   Yes

## 2022-10-03 NOTE — PROGRESS NOTES
Hematology/Oncology Outpatient Follow- up Note  Burke Lay, 1975, [id number]  10/6/2022        Chief Complaint   Patient presents with    Follow-up       HPI:  Burke Lay is a 40 yo male who is a former heavy smoker with a h/o hypertension who was referred to hematology for an elevated white count, elevated hematocrit that was detected on routine labs  He was seen for an initial consult on 12/24/20  Patient was noted to have some very mild leukocytosis with an elevated hematocrit  His differential was normal   Patient does have a long history of smoking, he is overweight and also suffers from hypertension  Possible etiologies include primary versus secondary polycythemia  Myeloproliferative work up reviewed  MANISHA 2 normal, no mutations identified  PCR BCR/ABL was normal    Erythropoietin level was normal 8  Of note, patient's MCV is low which could also indicate an iron deficiency vs ? thalassemia trait      Previous Hematologic/ Oncologic History:    Work up    Current Hematologic/ Oncologic Treatment:    oral iron    Interval History:  The patient presents for routine follow up  He was last seen on 02/11 and started on oral iron replacement for suspected subclinical iron deficiency  He reports he is tolerating oral iron well  Most recent blood work completed on 10/5 was reviewed  CBC and differential stable  White count remains mildly elevated 12 08, RBC elevated 6 66, hemoglobin 16 2, MCV 78, Hct 51 6 platelets 990, differential shows mild increase in absolute basophils 0 14  CMP normal  Iron panel normal   Serum iron 81, iron saturation 26%, ferritin 177    Question thalassemia trait given persistently decreased MCV without evidence of iron deficiency or anemia    He does continue to smoke put reports he has cut back significantly  He denies any significant concerns today    Continues to follow closely with PCP for hypertension management    Test Results:    Imaging: No results found  Labs:   Lab Results   Component Value Date    WBC 12 08 (H) 10/05/2022    HGB 16 2 10/05/2022    HCT 51 6 (H) 10/05/2022    MCV 78 (L) 10/05/2022     (H) 10/05/2022     Lab Results   Component Value Date     02/14/2015    K 4 0 10/05/2022     (H) 10/05/2022    CO2 24 10/05/2022    ANIONGAP 5 02/14/2015    BUN 10 10/05/2022    CREATININE 0 80 10/05/2022    GLUCOSE 91 02/14/2015    GLUF 91 10/05/2022    CALCIUM 9 3 10/05/2022    AST 24 10/05/2022    ALT 44 10/05/2022    ALKPHOS 78 10/05/2022    PROT 8 2 02/14/2015    BILITOT 0 5 02/14/2015    EGFR 108 10/05/2022           Review of Systems   All other systems reviewed and are negative  Active Problems:   Patient Active Problem List   Diagnosis    Nicotine dependence    Class 3 severe obesity due to excess calories with serious comorbidity and body mass index (BMI) of 40 0 to 44 9 in adult ([facility] [address]    Hypertension    Hyperlipidemia    Loud snoring    Lump    Obesity, morbid, BMI 40 0-49 9 ([facility])    Essential hypertension    Abnormal CBC    Obesity (BMI 30-39  9)    Tachycardia    Abnormal EKG    Encounter for long-term current use of medication       Past Medical History:   Past Medical History:   Diagnosis Date    Hyperlipidemia     Hypertension     Nicotine dependence        Surgical History: No past surgical history on file  Family History:    Family History   Problem Relation Age of Onset    Arthritis Mother     Hyperlipidemia Mother     Hypertension Mother     Kidney failure Mother     Other Father     Diabetes Father     Hypertension Father     Hypertension Sister        Cancer-related family history is not on file      Social History:   Social History     Socioeconomic History    Marital status: Single     Spouse name: Not on file    Number of children: 1    Years of education: Not on file    Highest education level: Not on file   Occupational History    Not on file   Tobacco Use    Smoking status: Current Some Day Smoker     Types: Cigars    Smokeless tobacco: Never Used   Substance and Sexual Activity    Alcohol use: Yes     Comment: social alcohol use    Drug use: No    Sexual activity: Yes     Partners: Female     Birth control/protection: Female Sterilization   Other Topics Concern    Not on file   Social History Narrative    Caffeine use    Marital history - Currently  - As per Jamal    Parentage    Sedentary lifestyle    Working full time      Social Determinants of Health     Financial Resource Strain: Not on file   Food Insecurity: Not on file   Transportation Needs: Not on file   Physical Activity: Not on file   Stress: Not on file   Social Connections: Not on file   Intimate Partner Violence: Not on file   Housing Stability: Not on file       Current Medications:   Current Outpatient Medications   Medication Sig Dispense Refill    carvedilol (COREG) 12 5 mg tablet TAKE 1 TABLET BY MOUTH TWICE A DAY WITH FOOD 180 tablet 2    hydrochlorothiazide (HYDRODIURIL) 25 mg tablet TAKE 1 TABLET (25 MG TOTAL) BY MOUTH DAILY  90 tablet 2    rosuvastatin (CRESTOR) 5 mg tablet TAKE 1 TABLET (5 MG TOTAL) BY MOUTH DAILY  90 tablet 0    losartan (COZAAR) 50 mg tablet Take 1 tablet (50 mg total) by mouth daily 30 tablet 2     No current facility-administered medications for this visit  Allergies: Allergies   Allergen Reactions    Aspirin Rash       Physical Exam:  /86 (BP Location: Right arm, Patient Position: Sitting, Cuff Size: Adult)   Pulse 86   Temp 98 5 °F (36 9 °C) (Tympanic)   Resp 16   Ht 5' 7" (1 702 m)   Wt 117 kg (259 lb)   SpO2 98%   BMI 40 57 kg/m²   Body surface area is 2 25 meters squared  Wt Readings from Last 3 Encounters:   10/06/22 117 kg (259 lb)   07/13/22 114 kg (251 lb)   07/02/22 114 kg (251 lb)           Physical Exam  Vitals reviewed  Constitutional:       General: He is not in acute distress  Appearance: He is well-developed   He is not diaphoretic  HENT:      Head: Normocephalic and atraumatic  Mouth/Throat:      Pharynx: No oropharyngeal exudate  Eyes:      General: No scleral icterus  Pupils: Pupils are equal, round, and reactive to light  Cardiovascular:      Rate and Rhythm: Normal rate and regular rhythm  Heart sounds: No murmur heard  Pulmonary:      Effort: Pulmonary effort is normal  No respiratory distress  Breath sounds: Normal breath sounds  Abdominal:      General: Bowel sounds are normal  There is no distension  Palpations: Abdomen is soft  There is no mass  Tenderness: There is no abdominal tenderness  Musculoskeletal:         General: Normal range of motion  Cervical back: Normal range of motion and neck supple  Lymphadenopathy:      Cervical: No cervical adenopathy  Skin:     General: Skin is warm and dry  Neurological:      General: No focal deficit present  Mental Status: He is alert and oriented to person, place, and time  Psychiatric:         Behavior: Behavior normal          Thought Content: Thought content normal          Judgment: Judgment normal          Assessment / Plan:    1  Microcytosis    2  Polycythemia    The patient is a very pleasant 19-year-old gentleman who was referred to Hematology for abnormal CBC that was picked up on routine blood work  Patient was noted to have some very mild leukocytosis with an elevated hematocrit  Normal differential   Patient does have a long history of smoking, he is overweight and also suffers from hypertension  Possible etiologies included primary versus secondary polycythemia  A myeloproliferative work up was done and his MANISHA 2 negative, PCR BCR/ABL negative  Erythropoietin level was normal which ruled out primary polycythemia  Due to low MCV and mildly elevated platelet count, I prescribed a therapeutic trial of oral iron to see if this would help improve his numbers and will out subclinical iron deficiency    Patient has been on oral iron and iron panel is normal   However he does continue to show evidence of mild thrombocytosis, elevated RBC, elevated hematocrit mild leukocytosis along with a low MCV 78  His most recent CBC demonstrated WBC 12 08, RBC elevated 6 66, hemoglobin 16 2, MCV 78, Hct 51 6 platelets 290, differential shows mild increase in absolute basophils 0 14  I suspect these are reactive in light of a negative myeloproliferative workup  He does not have a primary bone marrow disorder  He may have thalassemia trait due to persistent low MCV  I will check for this  He has a secondary polycythemia which is resulting in elevated hematocrit and RBCs  I have recommended he donate blood at [facility] blood bank twice in the next 3 months  I would like to repeat blood work after blood donation and see how his numbers respond  Patient was in agreement with this recommendation  I have completed the [facility] blood bank donation request form  He will return for a follow-up visit in 3 months with repeat labs to include CBC, CMP, iron panel and thalassemia testing  Patient verbalized understanding and was in agreement with plan of care  He knows to call at any time with questions or concerns    Barriers to care: None  Patient able to self care  Portions of the record may have been created with voice recognition software  Occasional wrong word or "sound a like" substitutions may have occurred due to the inherent limitations of voice recognition software  Read the chart carefully and recognize, using context, where substitutions have occurred

## 2022-10-04 ENCOUNTER — TELEPHONE (OUTPATIENT)
Dept: HEMATOLOGY ONCOLOGY | Facility: CLINIC | Age: 47
End: 2022-10-04

## 2022-10-04 DIAGNOSIS — D50.9 IRON DEFICIENCY ANEMIA, UNSPECIFIED IRON DEFICIENCY ANEMIA TYPE: Primary | ICD-10-CM

## 2022-10-04 DIAGNOSIS — R79.89 ABNORMAL CBC: ICD-10-CM

## 2022-10-04 NOTE — TELEPHONE ENCOUNTER
10/04/22    Spoke with patient reminding updated labs prior to appt  Will repeat: CBC/ CMP/ Iron study    Advising pt to go to any [facility] walk-in labs to get blood work done  Pt stated he will get labs done tomorrow

## 2022-10-05 ENCOUNTER — APPOINTMENT (OUTPATIENT)
Dept: LAB | Facility: AMBULARY SURGERY CENTER | Age: 47
End: 2022-10-05

## 2022-10-05 DIAGNOSIS — I10 ESSENTIAL HYPERTENSION: ICD-10-CM

## 2022-10-05 DIAGNOSIS — Z79.899 ENCOUNTER FOR LONG-TERM CURRENT USE OF MEDICATION: ICD-10-CM

## 2022-10-05 DIAGNOSIS — R79.89 ABNORMAL CBC: ICD-10-CM

## 2022-10-05 DIAGNOSIS — D50.9 IRON DEFICIENCY ANEMIA, UNSPECIFIED IRON DEFICIENCY ANEMIA TYPE: ICD-10-CM

## 2022-10-05 LAB
ALBUMIN SERPL BCP-MCNC: 3.6 G/DL (ref 3.5–5)
ALP SERPL-CCNC: 78 U/L (ref 46–116)
ALT SERPL W P-5'-P-CCNC: 44 U/L (ref 12–78)
ANION GAP SERPL CALCULATED.3IONS-SCNC: 7 MMOL/L (ref 4–13)
AST SERPL W P-5'-P-CCNC: 24 U/L (ref 5–45)
BASOPHILS # BLD AUTO: 0.14 THOUSANDS/ΜL (ref 0–0.1)
BASOPHILS NFR BLD AUTO: 1 % (ref 0–1)
BILIRUB SERPL-MCNC: 0.43 MG/DL (ref 0.2–1)
BUN SERPL-MCNC: 10 MG/DL (ref 5–25)
CALCIUM SERPL-MCNC: 9.3 MG/DL (ref 8.3–10.1)
CHLORIDE SERPL-SCNC: 110 MMOL/L (ref 96–108)
CHOLEST SERPL-MCNC: 231 MG/DL
CO2 SERPL-SCNC: 24 MMOL/L (ref 21–32)
CREAT SERPL-MCNC: 0.8 MG/DL (ref 0.6–1.3)
EOSINOPHIL # BLD AUTO: 0.53 THOUSAND/ΜL (ref 0–0.61)
EOSINOPHIL NFR BLD AUTO: 4 % (ref 0–6)
ERYTHROCYTE [DISTWIDTH] IN BLOOD BY AUTOMATED COUNT: 17.8 % (ref 11.6–15.1)
FERRITIN SERPL-MCNC: 177 NG/ML (ref 8–388)
GFR SERPL CREATININE-BSD FRML MDRD: 108 ML/MIN/1.73SQ M
GLUCOSE P FAST SERPL-MCNC: 91 MG/DL (ref 65–99)
HCT VFR BLD AUTO: 51.6 % (ref 36.5–49.3)
HDLC SERPL-MCNC: 25 MG/DL
HGB BLD-MCNC: 16.2 G/DL (ref 12–17)
IMM GRANULOCYTES # BLD AUTO: 0.06 THOUSAND/UL (ref 0–0.2)
IMM GRANULOCYTES NFR BLD AUTO: 1 % (ref 0–2)
IRON SATN MFR SERPL: 26 % (ref 20–50)
IRON SERPL-MCNC: 81 UG/DL (ref 65–175)
LDLC SERPL CALC-MCNC: 140 MG/DL (ref 0–100)
LDLC SERPL DIRECT ASSAY-MCNC: 151 MG/DL (ref 0–100)
LYMPHOCYTES # BLD AUTO: 4.45 THOUSANDS/ΜL (ref 0.6–4.47)
LYMPHOCYTES NFR BLD AUTO: 37 % (ref 14–44)
MCH RBC QN AUTO: 24.3 PG (ref 26.8–34.3)
MCHC RBC AUTO-ENTMCNC: 31.4 G/DL (ref 31.4–37.4)
MCV RBC AUTO: 78 FL (ref 82–98)
MONOCYTES # BLD AUTO: 1.22 THOUSAND/ΜL (ref 0.17–1.22)
MONOCYTES NFR BLD AUTO: 10 % (ref 4–12)
NEUTROPHILS # BLD AUTO: 5.68 THOUSANDS/ΜL (ref 1.85–7.62)
NEUTS SEG NFR BLD AUTO: 47 % (ref 43–75)
NONHDLC SERPL-MCNC: 206 MG/DL
NRBC BLD AUTO-RTO: 0 /100 WBCS
PLATELET # BLD AUTO: 399 THOUSANDS/UL (ref 149–390)
PMV BLD AUTO: 9.8 FL (ref 8.9–12.7)
POTASSIUM SERPL-SCNC: 4 MMOL/L (ref 3.5–5.3)
PROT SERPL-MCNC: 7.6 G/DL (ref 6.4–8.4)
RBC # BLD AUTO: 6.66 MILLION/UL (ref 3.88–5.62)
SODIUM SERPL-SCNC: 141 MMOL/L (ref 135–147)
TIBC SERPL-MCNC: 313 UG/DL (ref 250–450)
TRIGL SERPL-MCNC: 329 MG/DL
WBC # BLD AUTO: 12.08 THOUSAND/UL (ref 4.31–10.16)

## 2022-10-05 PROCEDURE — 80053 COMPREHEN METABOLIC PANEL: CPT

## 2022-10-05 PROCEDURE — 36415 COLL VENOUS BLD VENIPUNCTURE: CPT

## 2022-10-05 PROCEDURE — 82728 ASSAY OF FERRITIN: CPT

## 2022-10-05 PROCEDURE — 85025 COMPLETE CBC W/AUTO DIFF WBC: CPT

## 2022-10-05 PROCEDURE — 83540 ASSAY OF IRON: CPT

## 2022-10-05 PROCEDURE — 83550 IRON BINDING TEST: CPT

## 2022-10-06 ENCOUNTER — OFFICE VISIT (OUTPATIENT)
Dept: HEMATOLOGY ONCOLOGY | Facility: CLINIC | Age: 47
End: 2022-10-06

## 2022-10-06 VITALS
RESPIRATION RATE: 16 BRPM | SYSTOLIC BLOOD PRESSURE: 160 MMHG | DIASTOLIC BLOOD PRESSURE: 86 MMHG | TEMPERATURE: 98.5 F | HEART RATE: 86 BPM | WEIGHT: 259 LBS | HEIGHT: 67 IN | BODY MASS INDEX: 40.65 KG/M2 | OXYGEN SATURATION: 98 %

## 2022-10-06 DIAGNOSIS — R71.8 MICROCYTOSIS: Primary | ICD-10-CM

## 2022-10-06 DIAGNOSIS — D75.1 POLYCYTHEMIA: ICD-10-CM

## 2022-10-06 PROCEDURE — 99214 OFFICE O/P EST MOD 30 MIN: CPT | Performed by: NURSE PRACTITIONER

## 2022-10-13 ENCOUNTER — OFFICE VISIT (OUTPATIENT)
Dept: FAMILY MEDICINE CLINIC | Facility: CLINIC | Age: 47
End: 2022-10-13

## 2022-10-13 VITALS
DIASTOLIC BLOOD PRESSURE: 86 MMHG | SYSTOLIC BLOOD PRESSURE: 146 MMHG | HEIGHT: 67 IN | BODY MASS INDEX: 39.71 KG/M2 | HEART RATE: 84 BPM | RESPIRATION RATE: 18 BRPM | OXYGEN SATURATION: 97 % | WEIGHT: 253 LBS

## 2022-10-13 DIAGNOSIS — I10 ESSENTIAL HYPERTENSION: Primary | ICD-10-CM

## 2022-10-13 DIAGNOSIS — E78.2 MIXED HYPERLIPIDEMIA: ICD-10-CM

## 2022-10-13 DIAGNOSIS — E66.9 OBESITY (BMI 30-39.9): ICD-10-CM

## 2022-10-13 DIAGNOSIS — D75.1 POLYCYTHEMIA: ICD-10-CM

## 2022-10-13 PROCEDURE — 99212 OFFICE O/P EST SF 10 MIN: CPT | Performed by: NURSE PRACTITIONER

## 2022-10-13 RX ORDER — LOSARTAN POTASSIUM 50 MG/1
50 TABLET ORAL DAILY
Qty: 30 TABLET | Refills: 2 | Status: SHIPPED | OUTPATIENT
Start: 2022-10-13 | End: 2022-10-26 | Stop reason: SDUPTHER

## 2022-10-13 NOTE — PROGRESS NOTES
Name: Nevin Coronel      : 1975      MRN: [id number]  Encounter Provider: JOYCE Stephen  Encounter Date: 10/13/2022   Encounter department: [facility]     1  Essential hypertension  -     losartan (COZAAR) 50 mg tablet; Take 1 tablet (50 mg total) by mouth daily    Patient reports that his BP has been 130s/80s at home  Patient follows up with cardiology for HTN and has a follow-up visit on 10/26/22  Patient instructed to notify cardiology if his BP is consistently higher than 140/90 at home  2  Mixed hyperlipidemia  Patient follows up with cardiology  3  Obesity (BMI 30-39  9)  Patient instructed to eat a healthy low fat diet and to exercise on a regular basis  4  Polycythemia  Patient follows up with hematology  Importance of following up with specialists discussed  Patient instructed to follow-up in 3 months or sooner prn  BMI Counseling: Body mass index is 39 63 kg/m²  The BMI is above normal  Nutrition recommendations include encouraging healthy choices of fruits and vegetables, consuming healthier snacks, reducing intake of saturated and trans fat and reducing intake of cholesterol  Exercise recommendations include exercising 3-5 times per week  Rationale for BMI follow-up plan is due to patient being overweight or obese  Tobacco Cessation Counseling: Tobacco cessation counseling was provided  The patient is sincerely urged to quit consumption of tobacco  He is not ready to quit tobacco          Subjective      Patient is here for a follow-up for chronic medical conditions  Patient reports that he feels great  Patient follows up with cardiology for HTN and tachycardia  Patient reports that he takes his medications as prescribed  Patient reports that his BP has been 130s/80s at home  Patient has a follow-up with cardiology next week  Patient also follows up with cardiology for hyperlipidemia   Patient takes crestor 5mg daily  Patient is here for a follow-up for obesity  Patient reports that he could work on his diet more  Patient reports that he avoids fast food  Patient follows up with hematology for polycythemia  Review of Systems   Constitutional: Negative for chills, fatigue and fever  HENT: Negative for congestion, ear pain, sinus pressure and sore throat  Respiratory: Negative for cough, chest tightness, shortness of breath and wheezing  Cardiovascular: Negative for chest pain, palpitations and leg swelling  Gastrointestinal: Negative for abdominal pain, blood in stool, diarrhea, nausea and vomiting  Skin: Negative for rash  Neurological: Negative for dizziness, seizures, syncope, light-headedness and headaches  Current Outpatient Medications on File Prior to Visit   Medication Sig   • carvedilol (COREG) 12 5 mg tablet TAKE 1 TABLET BY MOUTH TWICE A DAY WITH FOOD   • hydrochlorothiazide (HYDRODIURIL) 25 mg tablet TAKE 1 TABLET (25 MG TOTAL) BY MOUTH DAILY  • rosuvastatin (CRESTOR) 5 mg tablet TAKE 1 TABLET (5 MG TOTAL) BY MOUTH DAILY  • [DISCONTINUED] losartan (COZAAR) 50 mg tablet Take 1 tablet (50 mg total) by mouth daily       Objective     /86   Pulse 84   Resp 18   Ht 5' 7" (1 702 m)   Wt 115 kg (253 lb)   SpO2 97%   BMI 39 63 kg/m²     Physical Exam  Vitals reviewed  Constitutional:       General: He is not in acute distress  Appearance: He is obese  He is not ill-appearing or diaphoretic  HENT:      Right Ear: External ear normal       Left Ear: External ear normal       Nose: Nose normal       Mouth/Throat:      Mouth: Mucous membranes are moist       Pharynx: Oropharynx is clear  No posterior oropharyngeal erythema  Eyes:      Conjunctiva/sclera: Conjunctivae normal       Pupils: Pupils are equal, round, and reactive to light  Cardiovascular:      Rate and Rhythm: Normal rate and regular rhythm  Pulses: Normal pulses        Heart sounds: Normal heart sounds  Comments: No edema noted  Pulmonary:      Effort: Pulmonary effort is normal  No respiratory distress  Breath sounds: Normal breath sounds  No wheezing  Musculoskeletal:      Comments: Gait wnl  Skin:     Findings: No rash  Neurological:      Mental Status: He is alert and oriented to person, place, and time     Psychiatric:         Mood and Affect: Mood normal        JOYCE Neil

## 2022-10-26 ENCOUNTER — OFFICE VISIT (OUTPATIENT)
Dept: CARDIOLOGY CLINIC | Facility: CLINIC | Age: 47
End: 2022-10-26

## 2022-10-26 VITALS
HEART RATE: 85 BPM | SYSTOLIC BLOOD PRESSURE: 140 MMHG | TEMPERATURE: 97.6 F | OXYGEN SATURATION: 98 % | WEIGHT: 253.6 LBS | HEIGHT: 67 IN | DIASTOLIC BLOOD PRESSURE: 100 MMHG | BODY MASS INDEX: 39.8 KG/M2

## 2022-10-26 DIAGNOSIS — I10 ESSENTIAL HYPERTENSION: Primary | ICD-10-CM

## 2022-10-26 DIAGNOSIS — E78.2 MIXED HYPERLIPIDEMIA: ICD-10-CM

## 2022-10-26 PROCEDURE — 99212 OFFICE O/P EST SF 10 MIN: CPT | Performed by: INTERNAL MEDICINE

## 2022-10-26 RX ORDER — LOSARTAN POTASSIUM 100 MG/1
100 TABLET ORAL DAILY
Qty: 90 TABLET | Refills: 3 | Status: SHIPPED | OUTPATIENT
Start: 2022-10-26 | End: 2022-11-25

## 2022-10-26 NOTE — PROGRESS NOTES
Cardiology Follow Up    Carly Duran  [id number]  1975  [facility] [address] [address] [facility]  [address] [place] [id number] [phone]      1  Essential hypertension  losartan (COZAAR) 100 MG tablet   2  Mixed hyperlipidemia         Discussion/Summary:    Essential HTN  Seen some early changes on echo with LVH and grade 1 diastolic dysfunction  Discussed being a little more aggressive  Reports his numbers are typically better at home, but diastolic remains mid 92H-06O  Increase losartan to 100mg  Continue coreg 12 5 BID, HCTZ 25  For dyslipidemia, he started on 5 Crestor in Sept   Blood work at beginning of Oct didn't show much of a drop, but depending on when he started, may not be seeing the full effect yet  Would maintain current dose and can monitor numbers in future and adjust if needed  Can follow up in office 1 year  Contact me in the meantime if BP not at goal         History of Present Illness:    73-year-old man  Seen for HTN, HL  Pretty longstanding diagnosis  In the past, he had been on several different antihypertensives including 100 mg of losartan, 100 mg twice a day of metoprolol tartrate, 12 5 mg hydrochlorothiazide  At 1 point, he was also on clonidine but he said this made him tired  Previously also on amlodipine  Drives a truck for living  Varying schedule and this leads to poor diet choices overall  High sodium diet  Eats on the go  Varying sleep cycle as well  He says he typically feels rested  He does snore  Never been tested for sleep apnea  Family history of hypertension, as well  When first seen, told he has been tachycardic and was recently restarted on his antihypertensives after being off of his meds for about 6 months  He was started on lower doses of his medications to start and these have been titrated up  50 mg of Cozaar 25 mg of Toprol-XL      He denies any chest pain, shortness of breath, PND, orthopnea, other limitations  Started on HCTZ, changed toprol to coreg  Interval History:  Since last visit with me, same antihypertensive regimen  Takes meds regularly  Also started on 5 Crestor  BP at home still a little high at times, tends to run higher in office  Echo with mild concentric hypertrophy, grade 1 DD  Feels well  Made changes with sodium in diet, as well  Patient Active Problem List   Diagnosis   • Nicotine dependence   • Class 3 severe obesity due to excess calories with serious comorbidity and body mass index (BMI) of 40 0 to 44 9 in [facility])   • Hypertension   • Hyperlipidemia   • Loud snoring   • Lump   • Obesity, morbid, BMI 40 0-49 9 ([facility])   • Essential hypertension   • Abnormal CBC   • Obesity (BMI 30-39  9)   • Tachycardia   • Abnormal EKG   • Encounter for long-term current use of medication   • Polycythemia     Past Medical History:   Diagnosis Date   • Hyperlipidemia    • Hypertension    • Nicotine dependence      Social History     Tobacco Use   • Smoking status: Current Some Day Smoker     Types: Cigars   • Smokeless tobacco: Never Used   Substance Use Topics   • Alcohol use: Yes     Comment: social alcohol use   • Drug use: No      Family History   Problem Relation Age of Onset   • Arthritis Mother    • Hyperlipidemia Mother    • Hypertension Mother    • Kidney failure Mother    • Other Father    • Diabetes Father    • Hypertension Father    • Hypertension Sister      History reviewed  No pertinent surgical history  Current Outpatient Medications:   •  carvedilol (COREG) 12 5 mg tablet, TAKE 1 TABLET BY MOUTH TWICE A DAY WITH FOOD, Disp: 180 tablet, Rfl: 2  •  hydrochlorothiazide (HYDRODIURIL) 25 mg tablet, TAKE 1 TABLET (25 MG TOTAL) BY MOUTH DAILY  , Disp: 90 tablet, Rfl: 2  •  losartan (COZAAR) 100 MG tablet, Take 1 tablet (100 mg total) by mouth daily, Disp: 90 tablet, Rfl: 3  •  rosuvastatin (CRESTOR) 5 mg tablet, TAKE 1 TABLET (5 MG TOTAL) BY MOUTH DAILY  , Disp: 90 tablet, Rfl: 0  Allergies   Allergen Reactions   • Aspirin Rash       Vitals:    10/26/22 0846   BP: 140/100   BP Location: Right arm   Patient Position: Standing   Cuff Size: Large   Pulse: 85   Temp: 97 6 °F (36 4 °C)   TempSrc: Tympanic   SpO2: 98%   Weight: 115 kg (253 lb 9 6 oz)   Height: 5' 7" (1 702 m)     Vitals:    10/26/22 0846   Weight: 115 kg (253 lb 9 6 oz)      Height: 5' 7" (170 2 cm)   Body mass index is 39 72 kg/m²  Physical Exam:  GEN: Low Quiroz appears well, alert and oriented x 3, pleasant and cooperative   HEENT: pupils equal, round, and reactive to light; extraocular muscles intact  NECK: supple, no carotid bruits   HEART: regular rhythm, normal S1 and S2, no murmurs, clicks, gallops or rubs   LUNGS: clear to auscultation bilaterally; no wheezes, rales, or rhonchi   ABDOMEN: normal bowel sounds, soft, no tenderness, no distention  EXTREMITIES: peripheral pulses normal; no clubbing, cyanosis, or edema  NEURO: no focal findings   SKIN: normal without suspicious lesions on exposed skin      ROS:  Except as noted in HPI, is otherwise reviewed in detail and a 12 point review of systems is negative    ROS reviewed and is unchanged    Labs:  Lab Results   Component Value Date    SODIUM 141 10/05/2022    K 4 0 10/05/2022     (H) 10/05/2022    CREATININE 0 80 10/05/2022    BUN 10 10/05/2022    CO2 24 10/05/2022    ALT 44 10/05/2022    AST 24 10/05/2022    GLUF 91 10/05/2022    HGBA1C 5 8 04/14/2019    WBC 12 08 (H) 10/05/2022    HGB 16 2 10/05/2022    HCT 51 6 (H) 10/05/2022     (H) 10/05/2022       Lab Results   Component Value Date    CHOL 231 02/14/2015    CHOL 252 12/06/2013     Lab Results   Component Value Date    HDL 25 (L) 10/05/2022    HDL 28 (L) 06/23/2022    HDL 27 (L) 01/13/2021     Lab Results   Component Value Date    LDLCALC 140 (H) 10/05/2022    LDLCALC 162 (H) 06/23/2022    LDLCALC 101 (H) 01/13/2021     Lab Results Component Value Date    TRIG 329 (H) 10/05/2022    TRIG 400 (H) 06/23/2022    TRIG 227 (H) 01/13/2021       Echo 7/2/22    Left Ventricle: Left ventricular cavity size is normal  Wall thickness is mildly increased  There is mild concentric hypertrophy  The left ventricular ejection fraction is 60%  Systolic function is normal  Wall motion is normal  Diastolic function is mildly abnormal, consistent with grade I (abnormal) relaxation  •  Mitral Valve: There is trace regurgitation

## 2022-11-09 DIAGNOSIS — E78.2 MIXED HYPERLIPIDEMIA: ICD-10-CM

## 2022-11-10 RX ORDER — ROSUVASTATIN CALCIUM 5 MG/1
5 TABLET, COATED ORAL DAILY
Qty: 90 TABLET | Refills: 0 | Status: SHIPPED | OUTPATIENT
Start: 2022-11-10

## 2023-04-20 PROBLEM — R22.31 LUMP OF SKIN OF RIGHT UPPER EXTREMITY: Status: ACTIVE | Noted: 2023-04-20

## 2023-04-20 PROBLEM — F41.9 ANXIETY: Status: ACTIVE | Noted: 2023-04-20

## 2023-05-22 ENCOUNTER — APPOINTMENT (OUTPATIENT)
Dept: LAB | Facility: AMBULARY SURGERY CENTER | Age: 48
End: 2023-05-22

## 2023-05-22 DIAGNOSIS — E78.2 MIXED HYPERLIPIDEMIA: ICD-10-CM

## 2023-05-22 DIAGNOSIS — R71.8 MICROCYTOSIS: ICD-10-CM

## 2023-05-22 DIAGNOSIS — D75.1 POLYCYTHEMIA: ICD-10-CM

## 2023-05-22 LAB
ALBUMIN SERPL BCP-MCNC: 3.7 G/DL (ref 3.5–5)
ALP SERPL-CCNC: 72 U/L (ref 46–116)
ALT SERPL W P-5'-P-CCNC: 40 U/L (ref 12–78)
ANION GAP SERPL CALCULATED.3IONS-SCNC: -2 MMOL/L (ref 4–13)
AST SERPL W P-5'-P-CCNC: 24 U/L (ref 5–45)
BASOPHILS # BLD AUTO: 0.14 THOUSANDS/ÂΜL (ref 0–0.1)
BASOPHILS NFR BLD AUTO: 1 % (ref 0–1)
BILIRUB SERPL-MCNC: 0.37 MG/DL (ref 0.2–1)
BUN SERPL-MCNC: 13 MG/DL (ref 5–25)
CALCIUM SERPL-MCNC: 9.2 MG/DL (ref 8.3–10.1)
CHLORIDE SERPL-SCNC: 109 MMOL/L (ref 96–108)
CHOLEST SERPL-MCNC: 256 MG/DL
CO2 SERPL-SCNC: 29 MMOL/L (ref 21–32)
CREAT SERPL-MCNC: 0.92 MG/DL (ref 0.6–1.3)
EOSINOPHIL # BLD AUTO: 0.57 THOUSAND/ÂΜL (ref 0–0.61)
EOSINOPHIL NFR BLD AUTO: 5 % (ref 0–6)
ERYTHROCYTE [DISTWIDTH] IN BLOOD BY AUTOMATED COUNT: 17.8 % (ref 11.6–15.1)
GFR SERPL CREATININE-BSD FRML MDRD: 100 ML/MIN/1.73SQ M
GLUCOSE P FAST SERPL-MCNC: 92 MG/DL (ref 65–99)
HCT VFR BLD AUTO: 50.9 % (ref 36.5–49.3)
HDLC SERPL-MCNC: 28 MG/DL
HGB BLD-MCNC: 16.4 G/DL (ref 12–17)
IMM GRANULOCYTES # BLD AUTO: 0.05 THOUSAND/UL (ref 0–0.2)
IMM GRANULOCYTES NFR BLD AUTO: 0 % (ref 0–2)
LDLC SERPL CALC-MCNC: 167 MG/DL (ref 0–100)
LYMPHOCYTES # BLD AUTO: 5.05 THOUSANDS/ÂΜL (ref 0.6–4.47)
LYMPHOCYTES NFR BLD AUTO: 43 % (ref 14–44)
MCH RBC QN AUTO: 24.7 PG (ref 26.8–34.3)
MCHC RBC AUTO-ENTMCNC: 32.2 G/DL (ref 31.4–37.4)
MCV RBC AUTO: 77 FL (ref 82–98)
MONOCYTES # BLD AUTO: 1.16 THOUSAND/ÂΜL (ref 0.17–1.22)
MONOCYTES NFR BLD AUTO: 10 % (ref 4–12)
NEUTROPHILS # BLD AUTO: 4.76 THOUSANDS/ÂΜL (ref 1.85–7.62)
NEUTS SEG NFR BLD AUTO: 41 % (ref 43–75)
NRBC BLD AUTO-RTO: 0 /100 WBCS
PLATELET # BLD AUTO: 427 THOUSANDS/UL (ref 149–390)
PMV BLD AUTO: 9.7 FL (ref 8.9–12.7)
POTASSIUM SERPL-SCNC: 3.8 MMOL/L (ref 3.5–5.3)
PROT SERPL-MCNC: 7.7 G/DL (ref 6.4–8.4)
RBC # BLD AUTO: 6.65 MILLION/UL (ref 3.88–5.62)
SODIUM SERPL-SCNC: 136 MMOL/L (ref 135–147)
TRIGL SERPL-MCNC: 303 MG/DL
WBC # BLD AUTO: 11.73 THOUSAND/UL (ref 4.31–10.16)

## 2023-06-07 ENCOUNTER — OFFICE VISIT (OUTPATIENT)
Dept: FAMILY MEDICINE CLINIC | Facility: CLINIC | Age: 48
End: 2023-06-07

## 2023-06-07 VITALS
HEIGHT: 67 IN | OXYGEN SATURATION: 98 % | DIASTOLIC BLOOD PRESSURE: 84 MMHG | BODY MASS INDEX: 39.87 KG/M2 | RESPIRATION RATE: 18 BRPM | HEART RATE: 95 BPM | SYSTOLIC BLOOD PRESSURE: 136 MMHG | WEIGHT: 254 LBS

## 2023-06-07 DIAGNOSIS — F41.9 ANXIETY: Primary | ICD-10-CM

## 2023-06-07 DIAGNOSIS — E78.2 MIXED HYPERLIPIDEMIA: ICD-10-CM

## 2023-06-07 DIAGNOSIS — D75.1 POLYCYTHEMIA: ICD-10-CM

## 2023-06-07 DIAGNOSIS — Z12.11 SCREENING FOR COLON CANCER: ICD-10-CM

## 2023-06-07 DIAGNOSIS — I10 ESSENTIAL HYPERTENSION: ICD-10-CM

## 2023-06-07 LAB — HBB GENE MUT ANL BLD/T: NORMAL

## 2023-06-07 PROCEDURE — 99212 OFFICE O/P EST SF 10 MIN: CPT | Performed by: NURSE PRACTITIONER

## 2023-06-07 RX ORDER — ROSUVASTATIN CALCIUM 10 MG/1
10 TABLET, COATED ORAL DAILY
Qty: 90 TABLET | Refills: 1 | Status: SHIPPED | OUTPATIENT
Start: 2023-06-07

## 2023-06-07 NOTE — PROGRESS NOTES
Name: Davon Villatoro      : 1975      MRN: [id number]  Encounter Provider: JOYCE Yadav  Encounter Date: 2023   [facility]     1  Anxiety   Patient reports that he takes lexapro 5mg daily and it helps  Denies any depression or suicidal thoughts  Continue lexapro 5mg daily  2  Mixed hyperlipidemia  -     rosuvastatin (CRESTOR) 10 MG tablet; Take 1 tablet (10 mg total) by mouth daily  -     Lipid Panel with Direct LDL reflex; Future; Expected date: 2023  -     Comprehensive metabolic panel; Future; Expected date: 2023    Total cholesterol 256, Triglycerides 303, and   Increased crestor to 10mg daily  Repeat lipid panel in 3 months  Patient instructed to eat a healthy low fat diet  3  Essential hypertension  Continue to follow-up with cardiology  4  Polycythemia  Patient instructed to make a follow-up appointment with hematology  5  Screening for colon cancer  -     Sriram    Reviewed lab results with the patient  Patient instructed to follow-up in 3 months or sooner prn  Subjective      Patient is here for a follow-up for chronic medical conditions  Patient takes lexapro 5mg daily for anxiety  Patient reports that the medication has really helped  Denies any depression or suicidal thoughts  Patient reports that he takes crestor 5mg daily for hyperlipidemia  Patient follows up with cardiology for HTN  Denies any chest pain, SOB, palpitations, dizziness, or Has  Patient follows up with hematology for polycythemia  Review of Systems   Constitutional: Negative for chills and fever  HENT: Negative for congestion, ear pain and sore throat  Respiratory: Negative for cough, chest tightness, shortness of breath and wheezing  Cardiovascular: Negative for chest pain and palpitations     Gastrointestinal: Negative for abdominal pain, diarrhea, "nausea and vomiting  Genitourinary: Negative for dysuria and hematuria  Skin: Negative for rash  Neurological: Negative for dizziness, syncope, light-headedness and headaches  Current Outpatient Medications on File Prior to Visit   Medication Sig   • carvedilol (COREG) 12 5 mg tablet TAKE 1 TABLET BY MOUTH TWICE A DAY WITH FOOD   • escitalopram (LEXAPRO) 5 mg tablet TAKE 1 TABLET (5 MG TOTAL) BY MOUTH DAILY  • hydrochlorothiazide (HYDRODIURIL) 25 mg tablet TAKE 1 TABLET (25 MG TOTAL) BY MOUTH DAILY  • [DISCONTINUED] rosuvastatin (CRESTOR) 5 mg tablet Take 1 tablet (5 mg total) by mouth daily   • losartan (COZAAR) 100 MG tablet Take 1 tablet (100 mg total) by mouth daily       Objective     /84   Pulse 95   Resp 18   Ht 5' 7\" (1 702 m)   Wt 115 kg (254 lb)   SpO2 98%   BMI 39 78 kg/m²     Physical Exam  Vitals reviewed  Constitutional:       General: He is not in acute distress  Appearance: He is not ill-appearing or diaphoretic  HENT:      Right Ear: External ear normal       Left Ear: External ear normal       Nose: Nose normal       Mouth/Throat:      Mouth: Mucous membranes are moist       Pharynx: Oropharynx is clear  No oropharyngeal exudate or posterior oropharyngeal erythema  Eyes:      Conjunctiva/sclera: Conjunctivae normal    Cardiovascular:      Rate and Rhythm: Normal rate and regular rhythm  Pulses: Normal pulses  Heart sounds: Normal heart sounds  Pulmonary:      Effort: Pulmonary effort is normal  No respiratory distress  Breath sounds: Normal breath sounds  No wheezing  Musculoskeletal:      Comments: Gait wnl  Skin:     Findings: No rash  Neurological:      Mental Status: He is alert and oriented to person, place, and time     Psychiatric:         Mood and Affect: Mood normal        JOYCE Hassan  "

## 2023-08-06 PROBLEM — Z12.11 SCREENING FOR COLON CANCER: Status: RESOLVED | Noted: 2023-06-07 | Resolved: 2023-08-06

## 2023-08-17 LAB — COLOGUARD RESULT REPORTABLE: NEGATIVE

## 2023-08-22 ENCOUNTER — TELEPHONE (OUTPATIENT)
Dept: FAMILY MEDICINE CLINIC | Facility: CLINIC | Age: 48
End: 2023-08-22

## 2023-08-22 NOTE — TELEPHONE ENCOUNTER
----- Message from [address] [place] [address] sent at 8/21/2023 12:09 PM EDT -----  Please let the patient know that his cologuard screening was negative.

## 2023-08-29 DIAGNOSIS — F41.9 ANXIETY: ICD-10-CM

## 2023-08-29 RX ORDER — ESCITALOPRAM OXALATE 5 MG/1
5 TABLET ORAL DAILY
Qty: 90 TABLET | Refills: 0 | Status: SHIPPED | OUTPATIENT
Start: 2023-08-29

## 2023-12-04 DIAGNOSIS — F41.9 ANXIETY: ICD-10-CM

## 2023-12-04 RX ORDER — ESCITALOPRAM OXALATE 5 MG/1
5 TABLET ORAL DAILY
Qty: 90 TABLET | Refills: 0 | Status: SHIPPED | OUTPATIENT
Start: 2023-12-04

## 2024-01-30 ENCOUNTER — HOSPITAL ENCOUNTER (INPATIENT)
Facility: HOSPITAL | Age: 49
LOS: 2 days | Discharge: HOME | DRG: 175 | End: 2024-02-02
Attending: EMERGENCY MEDICINE | Admitting: INTERNAL MEDICINE
Payer: COMMERCIAL

## 2024-01-30 ENCOUNTER — APPOINTMENT (EMERGENCY)
Dept: RADIOLOGY | Facility: HOSPITAL | Age: 49
DRG: 175 | End: 2024-01-30
Payer: COMMERCIAL

## 2024-01-30 DIAGNOSIS — I16.0 HYPERTENSIVE URGENCY: ICD-10-CM

## 2024-01-30 DIAGNOSIS — R79.89 ELEVATED TROPONIN LEVEL NOT DUE TO ACUTE CORONARY SYNDROME: ICD-10-CM

## 2024-01-30 DIAGNOSIS — Z95.5 S/P CORONARY ARTERY STENT PLACEMENT: ICD-10-CM

## 2024-01-30 DIAGNOSIS — I25.10 CORONARY ARTERY DISEASE INVOLVING NATIVE CORONARY ARTERY: ICD-10-CM

## 2024-01-30 DIAGNOSIS — I10 ESSENTIAL HYPERTENSION: ICD-10-CM

## 2024-01-30 DIAGNOSIS — R07.9 CHEST PAIN, UNSPECIFIED TYPE: Primary | ICD-10-CM

## 2024-01-30 LAB
2HR DELTA HS TROPONIN: 32 NG/L
4HR DELTA HS TROPONIN: 256 NG/L
ALBUMIN SERPL BCP-MCNC: 4.4 G/DL (ref 3.5–5)
ALP SERPL-CCNC: 80 U/L (ref 34–104)
ALT SERPL W P-5'-P-CCNC: 26 U/L (ref 7–52)
ANION GAP SERPL CALCULATED.3IONS-SCNC: 7 MMOL/L
AST SERPL W P-5'-P-CCNC: 20 U/L (ref 13–39)
BASOPHILS # BLD AUTO: 0.1 THOUSANDS/ÂΜL (ref 0–0.1)
BASOPHILS NFR BLD AUTO: 1 % (ref 0–1)
BILIRUB SERPL-MCNC: 0.48 MG/DL (ref 0.2–1)
BNP SERPL-MCNC: 24 PG/ML (ref 0–100)
BUN SERPL-MCNC: 11 MG/DL (ref 5–25)
CALCIUM SERPL-MCNC: 9.6 MG/DL (ref 8.4–10.2)
CARDIAC TROPONIN I PNL SERPL HS: 1398 NG/L
CARDIAC TROPONIN I PNL SERPL HS: 22 NG/L
CARDIAC TROPONIN I PNL SERPL HS: 278 NG/L
CARDIAC TROPONIN I PNL SERPL HS: 54 NG/L
CHLORIDE SERPL-SCNC: 105 MMOL/L (ref 96–108)
CO2 SERPL-SCNC: 25 MMOL/L (ref 21–32)
CREAT SERPL-MCNC: 0.74 MG/DL (ref 0.6–1.3)
EOSINOPHIL # BLD AUTO: 0.4 THOUSAND/ÂΜL (ref 0–0.61)
EOSINOPHIL NFR BLD AUTO: 3 % (ref 0–6)
ERYTHROCYTE [DISTWIDTH] IN BLOOD BY AUTOMATED COUNT: 17.2 % (ref 11.6–15.1)
GFR SERPL CREATININE-BSD FRML MDRD: 110 ML/MIN/1.73SQ M
GLUCOSE SERPL-MCNC: 136 MG/DL (ref 65–140)
HCT VFR BLD AUTO: 48.3 % (ref 36.5–49.3)
HGB BLD-MCNC: 15.8 G/DL (ref 12–17)
IMM GRANULOCYTES # BLD AUTO: 0.06 THOUSAND/UL (ref 0–0.2)
IMM GRANULOCYTES NFR BLD AUTO: 1 % (ref 0–2)
LYMPHOCYTES # BLD AUTO: 3.74 THOUSANDS/ÂΜL (ref 0.6–4.47)
LYMPHOCYTES NFR BLD AUTO: 29 % (ref 14–44)
MCH RBC QN AUTO: 25.1 PG (ref 26.8–34.3)
MCHC RBC AUTO-ENTMCNC: 32.7 G/DL (ref 31.4–37.4)
MCV RBC AUTO: 77 FL (ref 82–98)
MONOCYTES # BLD AUTO: 0.66 THOUSAND/ÂΜL (ref 0.17–1.22)
MONOCYTES NFR BLD AUTO: 5 % (ref 4–12)
NEUTROPHILS # BLD AUTO: 7.79 THOUSANDS/ÂΜL (ref 1.85–7.62)
NEUTS SEG NFR BLD AUTO: 61 % (ref 43–75)
NRBC BLD AUTO-RTO: 0 /100 WBCS
PLATELET # BLD AUTO: 345 THOUSANDS/UL (ref 149–390)
PMV BLD AUTO: 9.5 FL (ref 8.9–12.7)
POTASSIUM SERPL-SCNC: 3.6 MMOL/L (ref 3.5–5.3)
PROT SERPL-MCNC: 7.7 G/DL (ref 6.4–8.4)
RBC # BLD AUTO: 6.29 MILLION/UL (ref 3.88–5.62)
SODIUM SERPL-SCNC: 137 MMOL/L (ref 135–147)
WBC # BLD AUTO: 12.75 THOUSAND/UL (ref 4.31–10.16)

## 2024-01-30 PROCEDURE — 99285 EMERGENCY DEPT VISIT HI MDM: CPT

## 2024-01-30 PROCEDURE — 71045 X-RAY EXAM CHEST 1 VIEW: CPT

## 2024-01-30 PROCEDURE — 93005 ELECTROCARDIOGRAM TRACING: CPT

## 2024-01-30 PROCEDURE — 84484 ASSAY OF TROPONIN QUANT: CPT | Performed by: STUDENT IN AN ORGANIZED HEALTH CARE EDUCATION/TRAINING PROGRAM

## 2024-01-30 PROCEDURE — 83880 ASSAY OF NATRIURETIC PEPTIDE: CPT

## 2024-01-30 PROCEDURE — 99222 1ST HOSP IP/OBS MODERATE 55: CPT | Performed by: STUDENT IN AN ORGANIZED HEALTH CARE EDUCATION/TRAINING PROGRAM

## 2024-01-30 PROCEDURE — 99285 EMERGENCY DEPT VISIT HI MDM: CPT | Performed by: EMERGENCY MEDICINE

## 2024-01-30 PROCEDURE — 36415 COLL VENOUS BLD VENIPUNCTURE: CPT

## 2024-01-30 PROCEDURE — 84484 ASSAY OF TROPONIN QUANT: CPT | Performed by: EMERGENCY MEDICINE

## 2024-01-30 PROCEDURE — 80053 COMPREHEN METABOLIC PANEL: CPT | Performed by: EMERGENCY MEDICINE

## 2024-01-30 PROCEDURE — 85025 COMPLETE CBC W/AUTO DIFF WBC: CPT | Performed by: EMERGENCY MEDICINE

## 2024-01-30 RX ORDER — HYDROCHLOROTHIAZIDE 12.5 MG/1
25 TABLET ORAL ONCE
Status: COMPLETED | OUTPATIENT
Start: 2024-01-30 | End: 2024-01-30

## 2024-01-30 RX ORDER — HYDROCHLOROTHIAZIDE 12.5 MG/1
25 TABLET ORAL DAILY
Status: DISCONTINUED | OUTPATIENT
Start: 2024-01-31 | End: 2024-01-30

## 2024-01-30 RX ORDER — CARVEDILOL 12.5 MG/1
25 TABLET ORAL 2 TIMES DAILY WITH MEALS
Status: DISCONTINUED | OUTPATIENT
Start: 2024-01-31 | End: 2024-01-31

## 2024-01-30 RX ORDER — CARVEDILOL 12.5 MG/1
25 TABLET ORAL ONCE
Status: COMPLETED | OUTPATIENT
Start: 2024-01-30 | End: 2024-01-30

## 2024-01-30 RX ORDER — ONDANSETRON 2 MG/ML
4 INJECTION INTRAMUSCULAR; INTRAVENOUS EVERY 6 HOURS PRN
Status: DISCONTINUED | OUTPATIENT
Start: 2024-01-30 | End: 2024-02-02 | Stop reason: HOSPADM

## 2024-01-30 RX ORDER — HYDROCHLOROTHIAZIDE 25 MG/1
25 TABLET ORAL DAILY
Status: DISCONTINUED | OUTPATIENT
Start: 2024-01-31 | End: 2024-02-02 | Stop reason: HOSPADM

## 2024-01-30 RX ORDER — ACETAMINOPHEN 325 MG/1
650 TABLET ORAL EVERY 6 HOURS PRN
Status: DISCONTINUED | OUTPATIENT
Start: 2024-01-30 | End: 2024-02-02 | Stop reason: HOSPADM

## 2024-01-30 RX ORDER — ROSUVASTATIN CALCIUM 10 MG/1
10 TABLET, COATED ORAL DAILY
Status: DISCONTINUED | OUTPATIENT
Start: 2024-01-31 | End: 2024-01-31

## 2024-01-30 RX ORDER — LOSARTAN POTASSIUM 50 MG/1
100 TABLET ORAL DAILY
Status: DISCONTINUED | OUTPATIENT
Start: 2024-01-31 | End: 2024-02-02 | Stop reason: HOSPADM

## 2024-01-30 RX ORDER — FAMOTIDINE 20 MG/1
20 TABLET, FILM COATED ORAL ONCE
Status: COMPLETED | OUTPATIENT
Start: 2024-01-30 | End: 2024-01-30

## 2024-01-30 RX ORDER — NICOTINE 21 MG/24HR
1 PATCH, TRANSDERMAL 24 HOURS TRANSDERMAL DAILY
Status: DISCONTINUED | OUTPATIENT
Start: 2024-01-31 | End: 2024-02-02 | Stop reason: HOSPADM

## 2024-01-30 RX ORDER — ESCITALOPRAM OXALATE 10 MG/1
5 TABLET ORAL DAILY
Status: DISCONTINUED | OUTPATIENT
Start: 2024-01-31 | End: 2024-02-02 | Stop reason: HOSPADM

## 2024-01-30 RX ORDER — LOSARTAN POTASSIUM 50 MG/1
100 TABLET ORAL ONCE
Status: COMPLETED | OUTPATIENT
Start: 2024-01-30 | End: 2024-01-30

## 2024-01-30 RX ADMIN — HYDROCHLOROTHIAZIDE 25 MG: 12.5 TABLET ORAL at 22:41

## 2024-01-30 RX ADMIN — FAMOTIDINE 20 MG: 20 TABLET ORAL at 16:48

## 2024-01-30 RX ADMIN — CARVEDILOL 25 MG: 12.5 TABLET, FILM COATED ORAL at 22:41

## 2024-01-30 RX ADMIN — LOSARTAN POTASSIUM 100 MG: 50 TABLET, FILM COATED ORAL at 22:41

## 2024-01-30 NOTE — LETTER
[facility]  [address] [facility] [place] [address]  Dept: [phone]    February 2, 2024     Patient: Gerry Cali   YOB: 1975   Date of Visit: 1/30/2024       To Whom it May Concern:    Gerry Cali is under my professional care. He was seen in the hospital from 1/30/2024 to 02/02/24. He may return to work on 2/5/24 without limitations.    If you have any questions or concerns, please don't hesitate to call.         Sincerely,              Petty Duong PA-C

## 2024-01-30 NOTE — ED PROVIDER NOTES
History  Chief Complaint   Patient presents with    Chest Pain     Patient reports burning sensation in chest that started around 1100 this morning, pain radiates down left arm, history of HTN     48-year-old male with significant past medical history of hyperlipidemia and hypertension presenting to the ED with 9 out of 10 crushing pain in the upper chest radiating to the left arm and jaw.  Pain started approximately an hour prior to presentation.  While in the waiting room, patient states that symptoms went away however they did return once he made it back to his ER room and again went away after a few minutes.  Patient denies diaphoresis, headache, lightheadedness, dizziness, blurred vision during these episodes.  Patient does have a significant family history of death from MI.  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  Patient is obese but denies smoking, alcohol use, drug use.        Prior to Admission Medications   Prescriptions Last Dose Informant Patient Reported? Taking?   carvedilol (COREG) 12.5 mg tablet  Self No No   Sig: TAKE 1 TABLET BY MOUTH TWICE A DAY WITH FOOD   escitalopram (LEXAPRO) 5 mg tablet   No No   Sig: TAKE 1 TABLET (5 MG TOTAL) BY MOUTH DAILY.   hydrochlorothiazide (HYDRODIURIL) 25 mg tablet  Self No No   Sig: TAKE 1 TABLET (25 MG TOTAL) BY MOUTH DAILY.   losartan (COZAAR) 100 MG tablet   No No   Sig: Take 1 tablet (100 mg total) by mouth daily   rosuvastatin (CRESTOR) 10 MG tablet   No No   Sig: Take 1 tablet (10 mg total) by mouth daily      Facility-Administered Medications: None       Past Medical History:   Diagnosis Date    Hyperlipidemia     Hypertension     Nicotine dependence        History reviewed. No pertinent surgical history.    Family History   Problem Relation Age of Onset    Arthritis Mother     Hyperlipidemia Mother     Hypertension Mother     Kidney failure  Mother     Other Father     Diabetes Father     Hypertension Father     Hypertension Sister      I have reviewed and agree with the history as documented.    E-Cigarette/Vaping     E-Cigarette/Vaping Substances     Social History     Tobacco Use    Smoking status: Some Days     Types: Cigars    Smokeless tobacco: Never   Substance Use Topics    Alcohol use: Yes     Comment: social alcohol use    Drug use: No        Review of Systems   Constitutional:  Negative for chills and fever.   HENT:  Negative for rhinorrhea and sinus pressure.    Respiratory:  Negative for chest tightness and shortness of breath.    Cardiovascular:  Positive for chest pain. Negative for palpitations and leg swelling.   Gastrointestinal:  Negative for abdominal pain, diarrhea, nausea and vomiting.   Genitourinary:  Negative for difficulty urinating.   Musculoskeletal:  Negative for arthralgias and myalgias.   Neurological:  Positive for headaches. Negative for dizziness, weakness and numbness.   Psychiatric/Behavioral:  Negative for agitation.        Physical Exam  ED Triage Vitals   Temperature Pulse Respirations Blood Pressure SpO2   01/30/24 1619 01/30/24 1530 01/30/24 1530 01/30/24 1530 01/30/24 1530   98.6 °F (37 °C) 91 18 (!) 224/123 98 %      Temp Source Heart Rate Source Patient Position - Orthostatic VS BP Location FiO2 (%)   01/30/24 1619 01/30/24 1530 01/30/24 1530 01/30/24 1530 --   Oral Monitor Sitting Left arm       Pain Score       01/30/24 2040       No Pain             Orthostatic Vital Signs  Vitals:    01/30/24 1630 01/30/24 1700 01/30/24 1900 01/30/24 2040   BP: (!) 179/103 169/86 (!) 189/94 (!) 176/98   Pulse: 82 83 89 85   Patient Position - Orthostatic VS: Lying Sitting  Lying       Physical Exam  Constitutional:       Appearance: He is obese.   HENT:      Head: Normocephalic and atraumatic.   Eyes:      Extraocular Movements: Extraocular movements intact.   Cardiovascular:      Rate and Rhythm: Normal rate and regular  rhythm.      Heart sounds: Normal heart sounds.   Pulmonary:      Effort: Pulmonary effort is normal.      Breath sounds: Normal breath sounds.   Abdominal:      General: Bowel sounds are normal.      Palpations: Abdomen is soft.   Musculoskeletal:         General: Normal range of motion.      Cervical back: Normal range of motion.   Skin:     General: Skin is warm.      Capillary Refill: Capillary refill takes less than 2 seconds.   Neurological:      General: No focal deficit present.      Mental Status: He is alert and oriented to person, place, and time.   Psychiatric:         Mood and Affect: Mood normal.         Behavior: Behavior normal.         ED Medications  Medications   escitalopram (LEXAPRO) tablet 5 mg (has no administration in time range)   hydroCHLOROthiazide tablet 25 mg (has no administration in time range)   losartan (COZAAR) tablet 100 mg (has no administration in time range)   rosuvastatin (CRESTOR) tablet 10 mg (has no administration in time range)   carvedilol (COREG) tablet 25 mg (has no administration in time range)   famotidine (PEPCID) tablet 20 mg (20 mg Oral Given 1/30/24 1648)       Diagnostic Studies  Results Reviewed       Procedure Component Value Units Date/Time    HS Troponin I 4hr [[id number]]  (Abnormal) Collected: 01/30/24 1938    Lab Status: Final result Specimen: Blood Updated: 01/30/24 2001     hs TnI 4hr 278 ng/L      Delta 4hr hsTnI 256 ng/L     B-Type Natriuretic Peptide(BNP) [[id number]]  (Normal) Collected: 01/30/24 1533    Lab Status: Final result Specimen: Blood from Arm, Right Updated: 01/30/24 1801     BNP 24 pg/mL     HS Troponin I 2hr [[id number]]  (Abnormal) Collected: 01/30/24 1729    Lab Status: Final result Specimen: Blood from Arm, Right Updated: 01/30/24 1800     hs TnI 2hr 54 ng/L      Delta 2hr hsTnI 32 ng/L     HS Troponin 0hr (reflex protocol) [[id number]]  (Normal) Collected: 01/30/24 1533    Lab Status: Final result Specimen: Blood from Arm, Right Updated:  01/30/24 1559     hs TnI 0hr 22 ng/L     Comprehensive metabolic panel [[id number]] Collected: 01/30/24 1533    Lab Status: Final result Specimen: Blood from Arm, Right Updated: 01/30/24 1555     Sodium 137 mmol/L      Potassium 3.6 mmol/L      Chloride 105 mmol/L      CO2 25 mmol/L      ANION GAP 7 mmol/L      BUN 11 mg/dL      Creatinine 0.74 mg/dL      Glucose 136 mg/dL      Calcium 9.6 mg/dL      AST 20 U/L      ALT 26 U/L      Alkaline Phosphatase 80 U/L      Total Protein 7.7 g/dL      Albumin 4.4 g/dL      Total Bilirubin 0.48 mg/dL      eGFR 110 ml/min/1.73sq m     Narrative:      National Kid[employer] Disease Foundation guidelines for Chronic Kidney Disease (CKD):     Stage 1 with normal or high GFR (GFR > 90 mL/min/1.73 square meters)    Stage 2 Mild CKD (GFR = 60-89 mL/min/1.73 square meters)    Stage 3A Moderate CKD (GFR = 45-59 mL/min/1.73 square meters)    Stage 3B Moderate CKD (GFR = 30-44 mL/min/1.73 square meters)    Stage 4 Severe CKD (GFR = 15-29 mL/min/1.73 square meters)    Stage 5 End Stage CKD (GFR <15 mL/min/1.73 square meters)  Note: GFR calculation is accurate only with a steady state creatinine    CBC and differential [[id number]]  (Abnormal) Collected: 01/30/24 1533    Lab Status: Final result Specimen: Blood from Arm, Right Updated: 01/30/24 1543     WBC 12.75 Thousand/uL      RBC 6.29 Million/uL      Hemoglobin 15.8 g/dL      Hematocrit 48.3 %      MCV 77 fL      MCH 25.1 pg      MCHC 32.7 g/dL      RDW 17.2 %      MPV 9.5 fL      Platelets 345 Thousands/uL      nRBC 0 /100 WBCs      Neutrophils Relative 61 %      Immat GRANS % 1 %      Lymphocytes Relative 29 %      Monocytes Relative 5 %      Eosinophils Relative 3 %      Basophils Relative 1 %      Neutrophils Absolute 7.79 Thousands/µL      Immature Grans Absolute 0.06 Thousand/uL      Lymphocytes Absolute 3.74 Thousands/µL      Monocytes Absolute 0.66 Thousand/µL      Eosinophils Absolute 0.40 Thousand/µL      Basophils Absolute 0.10  Thousands/µL                    XR chest 1 view portable   ED Interpretation by Dimas Fernandez MD (01/30 1728)   No acute cardiopulmonary disease.  No past comparison            Procedures  ECG 12 Lead Documentation Only    Date/Time: 1/30/2024 4:20 PM    Performed by: Dimas Fernandez MD  Authorized by: Dimas Fernandez MD    Indications / Diagnosis:  Chest pain  ECG reviewed by me, the ED Provider: yes    Patient location:  ED  Previous ECG:     Previous ECG:  Compared to current    Similarity:  Changes noted  Interpretation:     Interpretation: abnormal    Rate:     ECG rate:  88    ECG rate assessment: normal    Rhythm:     Rhythm: sinus rhythm    Ectopy:     Ectopy: none    QRS:     QRS axis:  Normal    QRS intervals:  Normal  Conduction:     Conduction: normal    ST segments:     ST segments:  Normal  T waves:     T waves: normal    ECG 12 Lead Documentation Only    Date/Time: 1/30/2024 5:42 PM    Performed by: Dimas Fenrandez MD  Authorized by: Dimas Fernandez MD    Indications / Diagnosis:  Chest pain  ECG reviewed by me, the ED Provider: yes    Patient location:  ED  Previous ECG:     Previous ECG:  Compared to current    Similarity:  No change  Interpretation:     Interpretation: abnormal    Rate:     ECG rate:  80    ECG rate assessment: normal    Rhythm:     Rhythm: sinus rhythm    Ectopy:     Ectopy: none    QRS:     QRS axis:  Normal    QRS intervals:  Normal  Conduction:     Conduction: abnormal      Abnormal conduction: incomplete RBBB    ST segments:     ST segments:  Normal  T waves:     T waves: normal          ED Course  ED Course as of 01/30/24 2053 Tue Jan 30, 2024   1700 No symptoms on initial presentation to provider.  Following my first eval of the patient, he had symptoms that he did not report to me but he stated they went away.  No symptoms on second eval                                       Medical Decision Making  48-year-old male who presented to the ED after an  episode of crushing chest pain.  Patient was asymptomatic when I initially evaluated him.  However after evaluating him in waiting room patient states that he had an episode again of crushing chest pain but did not alert the nursing staff or provider.  This pain did subside prior to my second evaluation.  At this time highly suspicious for MI versus ACS event.  Given physical exam and long not having shortness of breath, PE is also my differential.  Also considering illnesses such as viral illness versus other infection.  May also be costochondritis versus pericarditis versus myocarditis.  Primary EKG as well as repeat EKG was completed with no new findings.  Troponin was noted to be elevated with a delta of 32 for 2-hour Trope as well as a delta of 258 for the 4-hour Trope.  Patient was ultimately admitted to [facility] on telemetry in observation.  Patient's CMP was unremarkable as well as a CBC that was nonspecific for an elevated white blood cell count.  Patient admitted at this time.    Amount and/or Complexity of Data Reviewed  Labs: ordered.  Radiology: ordered and independent interpretation performed.    Risk  Prescription drug management.  Decision regarding hospitalization.          Disposition  Final diagnoses:   Chest pain, unspecified type     Time reflects when diagnosis was documented in both MDM as applicable and the Disposition within this note       Time User Action Codes Description Comment    1/30/2024  5:19 PM Dimas Marquez Add [R07.9] Chest pain, unspecified type           ED Disposition       ED Disposition   Admit    Condition   Stable    Date/Time   Tue Jan 30, 2024  5:19 PM    Comment   Case was discussed with [facility] and the patient's admission status was agreed to be Admission Status: observation status to the service of Dr. Priest .               Follow-up Information    None         Current Discharge Medication List        CONTINUE these medications which have NOT CHANGED    Details    carvedilol (COREG) 12.5 mg tablet TAKE 1 TABLET BY MOUTH TWICE A DAY WITH FOOD  Qty: 180 tablet, Refills: 2    Associated Diagnoses: Essential hypertension; Tachycardia      escitalopram (LEXAPRO) 5 mg tablet TAKE 1 TABLET (5 MG TOTAL) BY MOUTH DAILY.  Qty: 90 tablet, Refills: 0    Associated Diagnoses: Anxiety      hydrochlorothiazide (HYDRODIURIL) 25 mg tablet TAKE 1 TABLET (25 MG TOTAL) BY MOUTH DAILY.  Qty: 90 tablet, Refills: 2    Associated Diagnoses: Essential hypertension      losartan (COZAAR) 100 MG tablet Take 1 tablet (100 mg total) by mouth daily  Qty: 90 tablet, Refills: 3    Associated Diagnoses: Essential hypertension      rosuvastatin (CRESTOR) 10 MG tablet Take 1 tablet (10 mg total) by mouth daily  Qty: 90 tablet, Refills: 1    Associated Diagnoses: Mixed hyperlipidemia           No discharge procedures on file.    PDMP Review       None             ED Provider  Attending physically available and evaluated Gerry Cali. I managed the patient along with the ED Attending.    Electronically Signed by           Dimas Fernandez MD  01/30/24 2053

## 2024-01-31 ENCOUNTER — APPOINTMENT (INPATIENT)
Dept: NON INVASIVE DIAGNOSTICS | Facility: HOSPITAL | Age: 49
DRG: 175 | End: 2024-01-31
Payer: COMMERCIAL

## 2024-01-31 PROBLEM — R79.89 ELEVATED TROPONIN LEVEL NOT DUE TO ACUTE CORONARY SYNDROME: Status: RESOLVED | Noted: 2020-11-18 | Resolved: 2024-01-31

## 2024-01-31 LAB
2HR DELTA HS TROPONIN: 904 NG/L
4HR DELTA HS TROPONIN: 1388 NG/L
ANION GAP SERPL CALCULATED.3IONS-SCNC: 8 MMOL/L
APTT PPP: 38 SECONDS (ref 23–37)
APTT PPP: 48 SECONDS (ref 23–37)
APTT PPP: 54 SECONDS (ref 23–37)
APTT PPP: 56 SECONDS (ref 23–37)
ATRIAL RATE: 74 BPM
ATRIAL RATE: 76 BPM
ATRIAL RATE: 76 BPM
ATRIAL RATE: 80 BPM
ATRIAL RATE: 88 BPM
BASOPHILS # BLD AUTO: 0.12 THOUSANDS/ÂΜL (ref 0–0.1)
BASOPHILS NFR BLD AUTO: 1 % (ref 0–1)
BUN SERPL-MCNC: 10 MG/DL (ref 5–25)
CALCIUM SERPL-MCNC: 9.5 MG/DL (ref 8.4–10.2)
CARDIAC TROPONIN I PNL SERPL HS: 2302 NG/L
CARDIAC TROPONIN I PNL SERPL HS: 2361 NG/L (ref 8–18)
CARDIAC TROPONIN I PNL SERPL HS: 2786 NG/L
CHLORIDE SERPL-SCNC: 104 MMOL/L (ref 96–108)
CO2 SERPL-SCNC: 25 MMOL/L (ref 21–32)
CREAT SERPL-MCNC: 0.75 MG/DL (ref 0.6–1.3)
EOSINOPHIL # BLD AUTO: 0.72 THOUSAND/ÂΜL (ref 0–0.61)
EOSINOPHIL NFR BLD AUTO: 5 % (ref 0–6)
ERYTHROCYTE [DISTWIDTH] IN BLOOD BY AUTOMATED COUNT: 17 % (ref 11.6–15.1)
ERYTHROCYTE [DISTWIDTH] IN BLOOD BY AUTOMATED COUNT: 17.1 % (ref 11.6–15.1)
GFR SERPL CREATININE-BSD FRML MDRD: 110 ML/MIN/1.73SQ M
GLUCOSE SERPL-MCNC: 99 MG/DL (ref 65–140)
HCT VFR BLD AUTO: 47.5 % (ref 36.5–49.3)
HCT VFR BLD AUTO: 48.6 % (ref 36.5–49.3)
HGB BLD-MCNC: 15.2 G/DL (ref 12–17)
HGB BLD-MCNC: 15.4 G/DL (ref 12–17)
IMM GRANULOCYTES # BLD AUTO: 0.06 THOUSAND/UL (ref 0–0.2)
IMM GRANULOCYTES NFR BLD AUTO: 0 % (ref 0–2)
INR PPP: 0.99 (ref 0.84–1.19)
LYMPHOCYTES # BLD AUTO: 5.62 THOUSANDS/ÂΜL (ref 0.6–4.47)
LYMPHOCYTES NFR BLD AUTO: 42 % (ref 14–44)
MCH RBC QN AUTO: 24.5 PG (ref 26.8–34.3)
MCH RBC QN AUTO: 24.8 PG (ref 26.8–34.3)
MCHC RBC AUTO-ENTMCNC: 31.7 G/DL (ref 31.4–37.4)
MCHC RBC AUTO-ENTMCNC: 32 G/DL (ref 31.4–37.4)
MCV RBC AUTO: 77 FL (ref 82–98)
MCV RBC AUTO: 78 FL (ref 82–98)
MONOCYTES # BLD AUTO: 1.16 THOUSAND/ÂΜL (ref 0.17–1.22)
MONOCYTES NFR BLD AUTO: 9 % (ref 4–12)
NEUTROPHILS # BLD AUTO: 5.7 THOUSANDS/ÂΜL (ref 1.85–7.62)
NEUTS SEG NFR BLD AUTO: 43 % (ref 43–75)
NRBC BLD AUTO-RTO: 0 /100 WBCS
P AXIS: 32 DEGREES
P AXIS: 37 DEGREES
P AXIS: 41 DEGREES
P AXIS: 44 DEGREES
P AXIS: 46 DEGREES
PLATELET # BLD AUTO: 340 THOUSANDS/UL (ref 149–390)
PLATELET # BLD AUTO: 350 THOUSANDS/UL (ref 149–390)
PMV BLD AUTO: 9.3 FL (ref 8.9–12.7)
PMV BLD AUTO: 9.5 FL (ref 8.9–12.7)
POTASSIUM SERPL-SCNC: 3.6 MMOL/L (ref 3.5–5.3)
PR INTERVAL: 176 MS
PR INTERVAL: 178 MS
PR INTERVAL: 180 MS
PR INTERVAL: 184 MS
PR INTERVAL: 186 MS
PROTHROMBIN TIME: 13.7 SECONDS (ref 11.6–14.5)
QRS AXIS: 74 DEGREES
QRS AXIS: 77 DEGREES
QRS AXIS: 84 DEGREES
QRS AXIS: 90 DEGREES
QRS AXIS: 92 DEGREES
QRSD INTERVAL: 126 MS
QRSD INTERVAL: 132 MS
QRSD INTERVAL: 134 MS
QRSD INTERVAL: 136 MS
QRSD INTERVAL: 142 MS
QT INTERVAL: 372 MS
QT INTERVAL: 374 MS
QT INTERVAL: 400 MS
QT INTERVAL: 408 MS
QT INTERVAL: 410 MS
QTC INTERVAL: 429 MS
QTC INTERVAL: 444 MS
QTC INTERVAL: 452 MS
QTC INTERVAL: 459 MS
QTC INTERVAL: 461 MS
RBC # BLD AUTO: 6.2 MILLION/UL (ref 3.88–5.62)
RBC # BLD AUTO: 6.2 MILLION/UL (ref 3.88–5.62)
SODIUM SERPL-SCNC: 137 MMOL/L (ref 135–147)
T WAVE AXIS: 34 DEGREES
T WAVE AXIS: 36 DEGREES
T WAVE AXIS: 45 DEGREES
T WAVE AXIS: 55 DEGREES
T WAVE AXIS: 61 DEGREES
VENTRICULAR RATE: 74 BPM
VENTRICULAR RATE: 76 BPM
VENTRICULAR RATE: 76 BPM
VENTRICULAR RATE: 80 BPM
VENTRICULAR RATE: 88 BPM
WBC # BLD AUTO: 12.22 THOUSAND/UL (ref 4.31–10.16)
WBC # BLD AUTO: 13.38 THOUSAND/UL (ref 4.31–10.16)

## 2024-01-31 PROCEDURE — 84484 ASSAY OF TROPONIN QUANT: CPT | Performed by: STUDENT IN AN ORGANIZED HEALTH CARE EDUCATION/TRAINING PROGRAM

## 2024-01-31 PROCEDURE — 85730 THROMBOPLASTIN TIME PARTIAL: CPT | Performed by: NURSE PRACTITIONER

## 2024-01-31 PROCEDURE — 85730 THROMBOPLASTIN TIME PARTIAL: CPT | Performed by: STUDENT IN AN ORGANIZED HEALTH CARE EDUCATION/TRAINING PROGRAM

## 2024-01-31 PROCEDURE — 83036 HEMOGLOBIN GLYCOSYLATED A1C: CPT | Performed by: NURSE PRACTITIONER

## 2024-01-31 PROCEDURE — 93010 ELECTROCARDIOGRAM REPORT: CPT | Performed by: INTERNAL MEDICINE

## 2024-01-31 PROCEDURE — 93005 ELECTROCARDIOGRAM TRACING: CPT

## 2024-01-31 PROCEDURE — 85027 COMPLETE CBC AUTOMATED: CPT | Performed by: STUDENT IN AN ORGANIZED HEALTH CARE EDUCATION/TRAINING PROGRAM

## 2024-01-31 PROCEDURE — 99255 IP/OBS CONSLTJ NEW/EST HI 80: CPT | Performed by: INTERNAL MEDICINE

## 2024-01-31 PROCEDURE — 85730 THROMBOPLASTIN TIME PARTIAL: CPT | Performed by: PHYSICIAN ASSISTANT

## 2024-01-31 PROCEDURE — 99232 SBSQ HOSP IP/OBS MODERATE 35: CPT | Performed by: PHYSICIAN ASSISTANT

## 2024-01-31 PROCEDURE — 85610 PROTHROMBIN TIME: CPT | Performed by: STUDENT IN AN ORGANIZED HEALTH CARE EDUCATION/TRAINING PROGRAM

## 2024-01-31 PROCEDURE — 80048 BASIC METABOLIC PNL TOTAL CA: CPT | Performed by: NURSE PRACTITIONER

## 2024-01-31 PROCEDURE — 85025 COMPLETE CBC W/AUTO DIFF WBC: CPT | Performed by: NURSE PRACTITIONER

## 2024-01-31 RX ORDER — ASPIRIN 81 MG/1
81 TABLET, CHEWABLE ORAL DAILY
Status: DISCONTINUED | OUTPATIENT
Start: 2024-02-01 | End: 2024-02-02 | Stop reason: HOSPADM

## 2024-01-31 RX ORDER — ASPIRIN 325 MG
325 TABLET ORAL ONCE
Status: COMPLETED | OUTPATIENT
Start: 2024-01-31 | End: 2024-01-31

## 2024-01-31 RX ORDER — HEPARIN SODIUM 1000 [USP'U]/ML
4000 INJECTION, SOLUTION INTRAVENOUS; SUBCUTANEOUS ONCE
Status: COMPLETED | OUTPATIENT
Start: 2024-01-31 | End: 2024-01-31

## 2024-01-31 RX ORDER — ATORVASTATIN CALCIUM 40 MG/1
80 TABLET, FILM COATED ORAL
Status: DISCONTINUED | OUTPATIENT
Start: 2024-01-31 | End: 2024-02-02 | Stop reason: HOSPADM

## 2024-01-31 RX ORDER — CARVEDILOL 12.5 MG/1
12.5 TABLET ORAL 2 TIMES DAILY WITH MEALS
Status: DISCONTINUED | OUTPATIENT
Start: 2024-01-31 | End: 2024-02-02 | Stop reason: HOSPADM

## 2024-01-31 RX ORDER — HEPARIN SODIUM 10000 [USP'U]/100ML
3-20 INJECTION, SOLUTION INTRAVENOUS
Status: DISCONTINUED | OUTPATIENT
Start: 2024-01-31 | End: 2024-02-01

## 2024-01-31 RX ORDER — HEPARIN SODIUM 1000 [USP'U]/ML
2000 INJECTION, SOLUTION INTRAVENOUS; SUBCUTANEOUS EVERY 6 HOURS PRN
Status: DISCONTINUED | OUTPATIENT
Start: 2024-01-31 | End: 2024-02-01

## 2024-01-31 RX ORDER — HYDRALAZINE HYDROCHLORIDE 20 MG/ML
10 INJECTION INTRAMUSCULAR; INTRAVENOUS EVERY 6 HOURS PRN
Status: DISCONTINUED | OUTPATIENT
Start: 2024-01-31 | End: 2024-02-02 | Stop reason: HOSPADM

## 2024-01-31 RX ORDER — HEPARIN SODIUM 1000 [USP'U]/ML
4000 INJECTION, SOLUTION INTRAVENOUS; SUBCUTANEOUS EVERY 6 HOURS PRN
Status: DISCONTINUED | OUTPATIENT
Start: 2024-01-31 | End: 2024-02-01

## 2024-01-31 RX ADMIN — HEPARIN SODIUM 15.1 UNITS/KG/HR: 10000 INJECTION, SOLUTION INTRAVENOUS at 19:00

## 2024-01-31 RX ADMIN — ATORVASTATIN CALCIUM 80 MG: 40 TABLET, FILM COATED ORAL at 17:04

## 2024-01-31 RX ADMIN — TICAGRELOR 180 MG: 90 TABLET ORAL at 11:16

## 2024-01-31 RX ADMIN — HYDROCHLOROTHIAZIDE 25 MG: 25 TABLET ORAL at 07:40

## 2024-01-31 RX ADMIN — HEPARIN SODIUM 2000 UNITS: 1000 INJECTION INTRAVENOUS; SUBCUTANEOUS at 15:16

## 2024-01-31 RX ADMIN — HEPARIN SODIUM 11.1 UNITS/KG/HR: 10000 INJECTION, SOLUTION INTRAVENOUS at 01:33

## 2024-01-31 RX ADMIN — CARVEDILOL 12.5 MG: 12.5 TABLET, FILM COATED ORAL at 17:04

## 2024-01-31 RX ADMIN — CARVEDILOL 25 MG: 12.5 TABLET, FILM COATED ORAL at 07:40

## 2024-01-31 RX ADMIN — HEPARIN SODIUM 2000 UNITS: 1000 INJECTION INTRAVENOUS; SUBCUTANEOUS at 08:35

## 2024-01-31 RX ADMIN — ESCITALOPRAM OXALATE 5 MG: 10 TABLET ORAL at 07:40

## 2024-01-31 RX ADMIN — TICAGRELOR 90 MG: 90 TABLET ORAL at 22:28

## 2024-01-31 RX ADMIN — HEPARIN SODIUM 4000 UNITS: 1000 INJECTION INTRAVENOUS; SUBCUTANEOUS at 01:32

## 2024-01-31 RX ADMIN — HEPARIN SODIUM 2000 UNITS: 1000 INJECTION INTRAVENOUS; SUBCUTANEOUS at 22:30

## 2024-01-31 RX ADMIN — LOSARTAN POTASSIUM 100 MG: 50 TABLET, FILM COATED ORAL at 07:40

## 2024-01-31 RX ADMIN — ASPIRIN 325 MG ORAL TABLET 325 MG: 325 PILL ORAL at 01:30

## 2024-01-31 NOTE — ASSESSMENT & PLAN NOTE
The patient is chest pain resolved.  This is most likely demand ischemia because of significantly high blood pressure.  Will continue to trend troponin to peak.  Will continue to trend EKGs.  Resume blood pressure medications, losartan 100 mg, hydrochlorothiazide 25 mg, increase carvedilol to 25 mg.  Give one-time dose now and assess the blood pressure in 3 hours.  Routine echocardiography ordered to be done in the morning.  Cardiology consult placed for further evaluation in the morning.

## 2024-01-31 NOTE — ASSESSMENT & PLAN NOTE
Patient's blood pressure above goal   This is likely being driven by poor diet and stressful lifestyle as a  precluding healthy food options  Will discuss with Cardiology regarding secondary HTN work up   Continue home blood pressure  Coreg increased to 25 mg Q12 this hospitalization

## 2024-01-31 NOTE — ED ATTENDING ATTESTATION
1/30/2024  I, Abhishek Flores MD, saw and evaluated the patient. I have discussed the patient with the resident/non-physician practitioner and agree with the resident's/non-physician practitioner's findings, Plan of Care, and MDM as documented in the resident's/non-physician practitioner's note, except where noted. All available labs and Radiology studies were reviewed.  I was present for key portions of any procedure(s) performed by the resident/non-physician practitioner and I was immediately available to provide assistance.       At this point I agree with the current assessment done in the Emergency Department.  I have conducted an independent evaluation of this patient a history and physical is as follows: Typical chest pain, EKG with no STEMI criteria, patient with no active chest pain in the emergency department.  Has had waxing and waning symptoms over the past week or so, more persistent and severe today.  Family history of early cardiac death in patient's uncle.    Initial troponin 22, patient with no active chest pain at time of admission.  Reports allergy to aspirin, states reaction is hives.    Results Reviewed       Procedure Component Value Units Date/Time    HS Troponin I 4hr [[id number]]  (Abnormal) Collected: 01/30/24 1938    Lab Status: Final result Specimen: Blood Updated: 01/30/24 2001     hs TnI 4hr 278 ng/L      Delta 4hr hsTnI 256 ng/L     B-Type Natriuretic Peptide(BNP) [[id number]]  (Normal) Collected: 01/30/24 1533    Lab Status: Final result Specimen: Blood from Arm, Right Updated: 01/30/24 1801     BNP 24 pg/mL     HS Troponin I 2hr [[id number]]  (Abnormal) Collected: 01/30/24 1729    Lab Status: Final result Specimen: Blood from Arm, Right Updated: 01/30/24 1800     hs TnI 2hr 54 ng/L      Delta 2hr hsTnI 32 ng/L     HS Troponin 0hr (reflex protocol) [[id number]]  (Normal) Collected: 01/30/24 1533    Lab Status: Final result Specimen: Blood from Arm, Right Updated: 01/30/24 1559      hs TnI 0hr 22 ng/L     Comprehensive metabolic panel [[id number]] Collected: 01/30/24 1533    Lab Status: Final result Specimen: Blood from Arm, Right Updated: 01/30/24 1555     Sodium 137 mmol/L      Potassium 3.6 mmol/L      Chloride 105 mmol/L      CO2 25 mmol/L      ANION GAP 7 mmol/L      BUN 11 mg/dL      Creatinine 0.74 mg/dL      Glucose 136 mg/dL      Calcium 9.6 mg/dL      AST 20 U/L      ALT 26 U/L      Alkaline Phosphatase 80 U/L      Total Protein 7.7 g/dL      Albumin 4.4 g/dL      Total Bilirubin 0.48 mg/dL      eGFR 110 ml/min/1.73sq m     Narrative:      National Kidney Disease Foundation guidelines for Chronic Kidney Disease (CKD):     Stage 1 with normal or high GFR (GFR > 90 mL/min/1.73 square meters)    Stage 2 Mild CKD (GFR = 60-89 mL/min/1.73 square meters)    Stage 3A Moderate CKD (GFR = 45-59 mL/min/1.73 square meters)    Stage 3B Moderate CKD (GFR = 30-44 mL/min/1.73 square meters)    Stage 4 Severe CKD (GFR = 15-29 mL/min/1.73 square meters)    Stage 5 End Stage CKD (GFR <15 mL/min/1.73 square meters)  Note: GFR calculation is accurate only with a steady state creatinine    CBC and differential [[id number]]  (Abnormal) Collected: 01/30/24 1533    Lab Status: Final result Specimen: Blood from Arm, Right Updated: 01/30/24 1543     WBC 12.75 Thousand/uL      RBC 6.29 Million/uL      Hemoglobin 15.8 g/dL      Hematocrit 48.3 %      MCV 77 fL      MCH 25.1 pg      MCHC 32.7 g/dL      RDW 17.2 %      MPV 9.5 fL      Platelets 345 Thousands/uL      nRBC 0 /100 WBCs      Neutrophils Relative 61 %      Immat GRANS % 1 %      Lymphocytes Relative 29 %      Monocytes Relative 5 %      Eosinophils Relative 3 %      Basophils Relative 1 %      Neutrophils Absolute 7.79 Thousands/µL      Immature Grans Absolute 0.06 Thousand/uL      Lymphocytes Absolute 3.74 Thousands/µL      Monocytes Absolute 0.66 Thousand/µL      Eosinophils Absolute 0.40 Thousand/µL      Basophils Absolute 0.10 Thousands/µL            XR chest 1 view portable   ED Interpretation by Dimas Fernandez MD (01/30 1728)   No acute cardiopulmonary disease.  No past comparison            ED Course         Critical Care Time  Procedures

## 2024-01-31 NOTE — CONSULTS
Consultation - Cardiology Team 1  Gerry Cali 48 y.o. male MRN: [id number]  Unit/Bed#: -01 Encounter: [id number]    Assessment/Plan     Principal Problem:    Elevated troponin level not due to acute coronary syndrome/possible NSTEMI  Active Problems:    Obesity, morbid, BMI 40.0-49.9 ([facility])    Hypertensive urgency      Assessment:  Chest pain with elevated troponin-type II MI in the setting of hypertensive urgency versus NSTEMI I   Hypertensive urgency-admits to medication noncompliance        Patient is to be on HCTZ 25 mg daily along with losartan 100 mg          Carvedilol 12.5mg twice a day  Morbid obesity-BMI 39  Tobacco abuse-was smoking up to 2 to 3 packs a day for many years  Noncompliance  Hyperlipidemia        No recent FLP. 5/2023-cholesterol 256/trig 303/HDL 28/  7.   Suspected NICKI    Plan:  Agree with resumption of his prescribed home medications-carvedilol        12.5mg twice daily, losartan 100 mg daily, HCTZ 25 mg daily  Proceed with left heart cath tomorrow and PCI if indicated  Will load with 180 mg of Brilinta followed by 90 mg every 12 hours  Continue aspirin at 81 mg daily. Continue IV heparin  Will check fasting lipid profile in the morning  Follow-up with transthoracic echocardiogram  Aggressive risk factor modification  Outpatient sleep study    History of Present Illness   Physician Requesting Consult: Elpidio Hidalgo DO  Reason for Consult / Principal Problem: elevated troponin, chest pain    HPI: Gerry Cali is a 48 y.o. year old male with tobacco abuse, noncompliance, HLD, HTN , obesity, RBBB who presents with chest pain..  Patient started with midsternal chest burning with radiation to the lower jaw started about a week ago associated with exertion and relieved with rest.  Yesterday he had rest pain.  Lasted about 10 to 15 minutes.  No associated symptoms otherwise.  Initial troponin 22 which peaked at 2786.  Now trending down.  Patient has been pain-free since 6:00 last  "evening.  He has been started on intravenous heparin.  He has been given full dose aspirin.  Patient with history of RBBB.  He presents with sinus rhythm/rightward axis and nonspecific IVCD.    0-hour troponin 22   2-hour troponin 54  4-hour troponin 28  Follow-up random 1398.  Trended to 2, 786.  Lastly 2361    Patient's prescribed medications have been resumed.  His blood pressure has significantly improved.  He arrived with a blood pressure of 224/123.  This morning 142/87.    Patient was seen by [facility] cardiology back in 2022 for hypertension.  At that time his losartan was increased to 100 mg daily and he was to continue HCTZ 25 mg along with carvedilol 12 and half milligram twice a day.  He initially told me that he was taking his medications but eventually disclosed that he has not been on any of his blood pressure or cholesterol pills for  \"a long time\".     Patient is trying to wean off of smoking but was smoking 2 to 3 packs cigarettes a day.  Reportedly social alcohol use.  Occupation is a .  He does not exercise.  He reports a poor diet.    Paternal uncle with CAD in his 60s.    TTE 7/2022-60%.  Grade 1 diastolic dysfunction.    Inpatient consult to Cardiology  Consult performed by: JOYCE Sarabia  Consult ordered by: Satish Bagley MD          Review of Systems   Constitutional: Negative.    HENT: Negative.     Eyes: Negative.    Respiratory: Negative.     Cardiovascular:  Positive for chest pain.   Gastrointestinal: Negative.    Endocrine: Negative.    Genitourinary: Negative.    Musculoskeletal: Negative.    Allergic/Immunologic: Negative.    Neurological: Negative.    Hematological: Negative.    Psychiatric/Behavioral: Negative.     All other systems reviewed and are negative.      Historical Information   Past Medical History:   Diagnosis Date    Hyperlipidemia     Hypertension     Nicotine dependence      History reviewed. No pertinent surgical history.  Social History "     Substance and Sexual Activity   Alcohol Use Yes    Comment: social alcohol use     Social History     Substance and Sexual Activity   Drug Use No     Social History     Tobacco Use   Smoking Status Some Days    Types: Cigars   Smokeless Tobacco Never     Family History:   Family History   Problem Relation Age of Onset    Arthritis Mother     Hyperlipidemia Mother     Hypertension Mother     Kidney failure Mother     Other Father     Diabetes Father     Hypertension Father     Hypertension Sister        Meds/Allergies   current meds:   Current Facility-Administered Medications   Medication Dose Route Frequency    acetaminophen (TYLENOL) tablet 650 mg  650 mg Oral Q6H PRN    [START ON 2/1/2024] aspirin chewable tablet 81 mg  81 mg Oral Daily    atorvastatin (LIPITOR) tablet 80 mg  80 mg Oral Daily With Dinner    carvedilol (COREG) tablet 25 mg  25 mg Oral BID With Meals    escitalopram (LEXAPRO) tablet 5 mg  5 mg Oral Daily    heparin (porcine) 25,000 units in 0.45% NaCl 250 mL infusion (premix)  3-20 Units/kg/hr (Order-Specific) Intravenous Titrated    heparin (porcine) injection 2,000 Units  2,000 Units Intravenous Q6H PRN    heparin (porcine) injection 4,000 Units  4,000 Units Intravenous Q6H PRN    hydrALAZINE (APRESOLINE) injection 10 mg  10 mg Intravenous Q6H PRN    hydroCHLOROthiazide tablet 25 mg  25 mg Oral Daily    losartan (COZAAR) tablet 100 mg  100 mg Oral Daily    nicotine (NICODERM CQ) 14 mg/24hr TD 24 hr patch 1 patch  1 patch Transdermal Daily    ondansetron (ZOFRAN) injection 4 mg  4 mg Intravenous Q6H PRN    ticagrelor (BRILINTA) tablet 180 mg  180 mg Oral Once    ticagrelor (BRILINTA) tablet 90 mg  90 mg Oral Q12H EMMETT    and PTA meds:    Medications Prior to Admission   Medication    carvedilol (COREG) 12.5 mg tablet    escitalopram (LEXAPRO) 5 mg tablet    hydrochlorothiazide (HYDRODIURIL) 25 mg tablet    losartan (COZAAR) 100 MG tablet    rosuvastatin (CRESTOR) 10 MG tablet     Allergies  "  Allergen Reactions    Aspirin Rash       Objective   Vitals: Blood pressure 142/87, pulse 75, temperature 98 °F (36.7 °C), temperature source Oral, resp. rate 18, height 5' 7\" (1.702 m), weight 113 kg (250 lb), SpO2 98%.  Orthostatic Blood Pressures      Flowsheet Row Most Recent Value   Blood Pressure 142/87 filed at 01/31/2024 0842   Patient Position - Orthostatic VS Lying filed at 01/31/2024 0842            No intake or output data in the 24 hours ending 01/31/24 1045    Invasive Devices       Peripheral Intravenous Line  Duration             Peripheral IV 01/30/24 Right Forearm <1 day                    Physical Exam: /87 (BP Location: Left arm)   Pulse 75   Temp 98 °F (36.7 °C) (Oral)   Resp 18   Ht 5' 7\" (1.702 m)   Wt 113 kg (250 lb)   SpO2 98%   BMI 39.16 kg/m²   General Appearance:    Alert, cooperative, no distress, appears stated age   Head:    Normocephalic, no scleral icterus   Eyes:    PERRL   Nose:   Nares normal, septum midline, mucosa normal, no drainage    Throat:   Lips, mucosa, and tongue normal   Neck:   Supple, symmetrical, trachea midline     no JVD       Lungs:     Clear to auscultation bilaterally, respirations unlabored        Heart:    Regular rate and rhythm, S1 and S2 normal, no murmur, rub   or gallop   Abdomen:     Soft, non-tender, bowel sounds active all four quadrants,     no masses, no organomegaly   Extremities:   Extremities normal, atraumatic, no cyanosis or edema   Pulses:   2+ and symmetric all extremities   Skin:   Skin color, texture, turgor normal, no rashes or lesions   Neurologic:   Alert and oriented to person place and time. No focal deficits       Lab Results:   Recent Results (from the past 72 hour(s))   CBC and differential    Collection Time: 01/30/24  3:33 PM   Result Value Ref Range    WBC 12.75 (H) 4.31 - 10.16 Thousand/uL    RBC 6.29 (H) 3.88 - 5.62 Million/uL    Hemoglobin 15.8 12.0 - 17.0 g/dL    Hematocrit 48.3 36.5 - 49.3 %    MCV 77 (L) 82 - " "98 fL    MCH 25.1 (L) 26.8 - 34.3 pg    MCHC 32.7 31.4 - 37.4 g/dL    RDW 17.2 (H) 11.6 - 15.1 %    MPV 9.5 8.9 - 12.7 fL    Platelets 345 149 - 390 Thousands/uL    nRBC 0 /100 WBCs    Neutrophils Relative 61 43 - 75 %    Immat GRANS % 1 0 - 2 %    Lymphocytes Relative 29 14 - 44 %    Monocytes Relative 5 4 - 12 %    Eosinophils Relative 3 0 - 6 %    Basophils Relative 1 0 - 1 %    Neutrophils Absolute 7.79 (H) 1.85 - 7.62 Thousands/µL    Immature Grans Absolute 0.06 0.00 - 0.20 Thousand/uL    Lymphocytes Absolute 3.74 0.60 - 4.47 Thousands/µL    Monocytes Absolute 0.66 0.17 - 1.22 Thousand/µL    Eosinophils Absolute 0.40 0.00 - 0.61 Thousand/µL    Basophils Absolute 0.10 0.00 - 0.10 Thousands/µL   Comprehensive metabolic panel    Collection Time: 01/30/24  3:33 PM   Result Value Ref Range    Sodium 137 135 - 147 mmol/L    Potassium 3.6 3.5 - 5.3 mmol/L    Chloride 105 96 - 108 mmol/L    CO2 25 21 - 32 mmol/L    ANION GAP 7 mmol/L    BUN 11 5 - 25 mg/dL    Creatinine 0.74 0.60 - 1.30 mg/dL    Glucose 136 65 - 140 mg/dL    Calcium 9.6 8.4 - 10.2 mg/dL    AST 20 13 - 39 U/L    ALT 26 7 - 52 U/L    Alkaline Phosphatase 80 34 - 104 U/L    Total Protein 7.7 6.4 - 8.4 g/dL    Albumin 4.4 3.5 - 5.0 g/dL    Total Bilirubin 0.48 0.20 - 1.00 mg/dL    eGFR 110 ml/min/1.73sq m   HS Troponin 0hr (reflex protocol)    Collection Time: 01/30/24  3:33 PM   Result Value Ref Range    hs TnI 0hr 22 \"Refer to ACS Flowchart\"- see link ng/L   B-Type Natriuretic Peptide(BNP)    Collection Time: 01/30/24  3:33 PM   Result Value Ref Range    BNP 24 0 - 100 pg/mL   ECG 12 lead    Collection Time: 01/30/24  3:35 PM   Result Value Ref Range    Ventricular Rate 88 BPM    Atrial Rate 88 BPM    SD Interval 176 ms    QRSD Interval 126 ms    QT Interval 374 ms    QTC Interval 452 ms    P [place] 32 degrees    QRS Axis 77 degrees    T Wave Axis 61 degrees   ECG 12 lead    Collection Time: 01/30/24  5:28 PM   Result Value Ref Range    Ventricular Rate 80 " "BPM    Atrial Rate 80 BPM    KS Interval 180 ms    QRSD Interval 132 ms    QT Interval 372 ms    QTC Interval 429 ms    P Axis 41 degrees    QRS Axis 74 degrees    T Wave Tw[place] 34 degrees   HS Troponin I 2hr    Collection Time: 01/30/24  5:29 PM   Result Value Ref Range    hs TnI 2hr 54 (H) \"Refer to ACS Flowchart\"- see link ng/L    Delta 2hr hsTnI 32 (H) <20 ng/L   HS Troponin I 4hr    Collection Time: 01/30/24  7:38 PM   Result Value Ref Range    hs TnI 4hr 278 (H) \"Refer to ACS Flowchart\"- see link ng/L    Delta 4hr hsTnI 256 (H) <20 ng/L   HS Troponin 0hr (reflex protocol)    Collection Time: 01/30/24 10:53 PM   Result Value Ref Range    hs TnI 0hr 1,398 (H) \"Refer to ACS Flowchart\"- see link ng/L   ECG 12 lead    Collection Time: 01/30/24 10:55 PM   Result Value Ref Range    Ventricular Rate 76 BPM    Atrial Rate 76 BPM    KS Interval 186 ms    QRSD Interval 142 ms    QT Interval 410 ms    QTC Interval 461 ms    P Axis 46 degrees    QRS Axis 90 degrees    T Wave Axis 45 degrees   ECG 12 lead    Collection Time: 01/30/24 10:56 PM   Result Value Ref Range    Ventricular Rate 76 BPM    Atrial Rate 76 BPM    KS Interval 184 ms    QRSD Interval 136 ms    QT Interval 408 ms    QTC Interval 459 ms    P Axis 37 degrees    QRS Axis 84 degrees    T Wave Twisp 36 degrees   HS Troponin I 2hr    Collection Time: 01/31/24  1:26 AM   Result Value Ref Range    hs TnI 2hr 2,302 (H) \"Refer to ACS Flowchart\"- see link ng/L    Delta 2hr hsTnI 904 (H) <20 ng/L   CBC    Collection Time: 01/31/24  1:26 AM   Result Value Ref Range    WBC 12.22 (H) 4.31 - 10.16 Thousand/uL    RBC 6.20 (H) 3.88 - 5.62 Million/uL    Hemoglobin 15.4 12.0 - 17.0 g/dL    Hematocrit 48.6 36.5 - 49.3 %    MCV 78 (L) 82 - 98 fL    MCH 24.8 (L) 26.8 - 34.3 pg    MCHC 31.7 31.4 - 37.4 g/dL    RDW 17.0 (H) 11.6 - 15.1 %    Platelets 340 149 - 390 Thousands/uL    MPV 9.5 8.9 - 12.7 fL   APTT    Collection Time: 01/31/24  1:26 AM   Result Value Ref Range    PTT 38 " "(H) 23 - 37 seconds   Protime-INR    Collection Time: 01/31/24  1:26 AM   Result Value Ref Range    Protime 13.7 11.6 - 14.5 seconds    INR 0.99 0.84 - 1.19   HS Troponin I 4hr    Collection Time: 01/31/24  3:54 AM   Result Value Ref Range    hs TnI 4hr 2,786 (H) \"Refer to ACS Flowchart\"- see link ng/L    Delta 4hr hsTnI 1,388 (H) <20 ng/L   Basic metabolic panel    Collection Time: 01/31/24  3:54 AM   Result Value Ref Range    Sodium 137 135 - 147 mmol/L    Potassium 3.6 3.5 - 5.3 mmol/L    Chloride 104 96 - 108 mmol/L    CO2 25 21 - 32 mmol/L    ANION GAP 8 mmol/L    BUN 10 5 - 25 mg/dL    Creatinine 0.75 0.60 - 1.30 mg/dL    Glucose 99 65 - 140 mg/dL    Calcium 9.5 8.4 - 10.2 mg/dL    eGFR 110 ml/min/1.73sq m   CBC and differential    Collection Time: 01/31/24  3:54 AM   Result Value Ref Range    WBC 13.38 (H) 4.31 - 10.16 Thousand/uL    RBC 6.20 (H) 3.88 - 5.62 Million/uL    Hemoglobin 15.2 12.0 - 17.0 g/dL    Hematocrit 47.5 36.5 - 49.3 %    MCV 77 (L) 82 - 98 fL    MCH 24.5 (L) 26.8 - 34.3 pg    MCHC 32.0 31.4 - 37.4 g/dL    RDW 17.1 (H) 11.6 - 15.1 %    MPV 9.3 8.9 - 12.7 fL    Platelets 350 149 - 390 Thousands/uL    nRBC 0 /100 WBCs    Neutrophils Relative 43 43 - 75 %    Immat GRANS % 0 0 - 2 %    Lymphocytes Relative 42 14 - 44 %    Monocytes Relative 9 4 - 12 %    Eosinophils Relative 5 0 - 6 %    Basophils Relative 1 0 - 1 %    Neutrophils Absolute 5.70 1.85 - 7.62 Thousands/µL    Immature Grans Absolute 0.06 0.00 - 0.20 Thousand/uL    Lymphocytes Absolute 5.62 (H) 0.60 - 4.47 Thousands/µL    Monocytes Absolute 1.16 0.17 - 1.22 Thousand/µL    Eosinophils Absolute 0.72 (H) 0.00 - 0.61 Thousand/µL    Basophils Absolute 0.12 (H) 0.00 - 0.10 Thousands/µL   APTT six (6) hours after Heparin bolus/drip initiation or dosing change    Collection Time: 01/31/24  7:27 AM   Result Value Ref Range    PTT 48 (H) 23 - 37 seconds   High Sensitivity Troponin I Random    Collection Time: 01/31/24  7:27 AM   Result Value " Ref Range    HS TnI random 2,361 (H) 8 - 18 ng/L   ECG 12 lead    Collection Time: 01/31/24  8:34 AM   Result Value Ref Range    Ventricular Rate 74 BPM    Atrial Rate 74 BPM    [place] Interval 178 ms    QRSD Interval 134 ms    QT Interval 400 ms    QTC Interval 444 ms    P Axis 44 degrees    QRS Axis 92 degrees    T Wave Axis 55 degrees     Imaging: I have personally reviewed pertinent reports.    EKG: NSR IVCD  VTE Prophylaxis: heparin    Code Status: Level 1 - Full Code  Advance Directive and Living Will:      Power of :    POLST:      Counseling / Coordination of Care  Total floor / unit time spent today 45 minutes.  Greater than 50% of total time was spent with the patient and / or family counseling and / or coordination of care.

## 2024-01-31 NOTE — ASSESSMENT & PLAN NOTE
BMI noted, his occupation as a  will make this difficult   The patient would benefit to obesity clinic as an outpatient.

## 2024-01-31 NOTE — PROGRESS NOTES
[facility]  Progress Note  Name: Gerry Cali I  MRN: [id number]  Unit/Bed#: -01 I Date of Admission: 1/30/2024   Date of Service: 1/31/2024 I Hospital Day: 0    Assessment/Plan   * Elevated troponin level not due to acute coronary syndrome/possible NSTEMI  Assessment & Plan  POA, patient had chest pain intermittently leading up to ED arrival described as heartburn radiating to his arms and neck/jaw. It has been resolved since   Initial troponin undetectable at 22, trended up to 2,786 at peak, and now most recently 2,361.   Remains chest pain free since arrival, no EKG changes   He is on heparin drip due to elevated troponin   Cardiology consulted   Maintain telemetry   Follow up with repeat ECHO  Continue Heparin until cardiology evaluates  Continue current antihypertensive regimen   Coreg was increased to 25 mg BID this admission   Continue Losartan and HCTZ at home doses  PRN added       Hypertensive urgency  Assessment & Plan  Patient's blood pressure above goal   This is likely being driven by poor diet and stressful lifestyle as a  precluding healthy food options  Will discuss with Cardiology regarding secondary HTN work up   Continue home blood pressure  Coreg increased to 25 mg Q12 this hospitalization     Obesity, morbid, BMI 40.0-49.9 ([facility])  Assessment & Plan  BMI noted, his occupation as a  will make this difficult   The patient would benefit to obesity clinic as an outpatient.             VTE Pharmacologic Prophylaxis: VTE Score: 2 Moderate Risk (Score 3-4) - Pharmacological DVT Prophylaxis Ordered: heparin drip.    Mobility:   Basic Mobility Inpatient Raw Score: 24  JH-HLM Goal: 8: Walk 250 feet or more  JH-HLM Achieved: 7: Walk 25 feet or more  HLM Goal achieved. Continue to encourage appropriate mobility.    Patient Centered Rounds: I performed bedside rounds with nursing staff today.   Discussions with Specialists or Other Care Team Provider:  Procedure Start   "Discussed with Cardiology, RN, CM    Education and Discussions with Family / Patient: Updated  (wife) at bedside.    Total Time Spent on Date of Encounter in care of patient: 35 mins. This time was spent on one or more of the following: performing physical exam; counseling and coordination of care; obtaining or reviewing history; documenting in the medical record; reviewing/ordering tests, medications or procedures; communicating with other healthcare professionals and discussing with patient's family/caregivers.    Current Length of Stay: 0 day(s)  Current Patient Status: Observation   Certification Statement: The patient will continue to require additional inpatient hospital stay due to pending cardiology evaluation   Discharge Plan: Anticipate discharge in 24-48 hrs to home.    Code Status: Level 1 - Full Code    Subjective:   Patient reports his chest pain remains resolved. States that no specific thing he noted took it away and it would spontaneously resolve prior to arrival as well. Denies SOB.     Reports that he is a  and has a \" diet\", therefore eating lots of salty, processed, unhealthy foods. He reports he smokes the occasional cigar. He is not a heavy drinker at all.     Objective:     Vitals:   Temp (24hrs), Av.1 °F (36.7 °C), Min:97.6 °F (36.4 °C), Max:98.6 °F (37 °C)    Temp:  [97.6 °F (36.4 °C)-98.6 °F (37 °C)] 98 °F (36.7 °C)  HR:  [75-91] 75  Resp:  [16-19] 18  BP: (142-224)/() 142/87  SpO2:  [97 %-98 %] 98 %  Body mass index is 39.16 kg/m².     Input and Output Summary (last 24 hours):   No intake or output data in the 24 hours ending 24 0922    Physical Exam:   Physical Exam  Constitutional:       General: He is not in acute distress.     Appearance: Normal appearance. He is overweight. He is not ill-appearing or diaphoretic.   HENT:      Head: Normocephalic and atraumatic.      Mouth/Throat:      Mouth: Mucous membranes are moist.   Eyes:     "  General: No scleral icterus.     Pupils: Pupils are equal, round, and reactive to light.   Cardiovascular:      Rate and Rhythm: Normal rate and regular rhythm.      Pulses: Normal pulses.      Heart sounds: Normal heart sounds, S1 normal and S2 normal. No murmur heard.     No systolic murmur is present.      No diastolic murmur is present.      No gallop. No S3 or S4 sounds.   Pulmonary:      Effort: Pulmonary effort is normal. No accessory muscle usage or respiratory distress.      Breath sounds: Normal breath sounds. No stridor. No decreased breath sounds, wheezing, rhonchi or rales.   Chest:      Chest wall: No tenderness.   Abdominal:      General: Bowel sounds are normal. There is no distension.      Palpations: Abdomen is soft.      Tenderness: There is no abdominal tenderness. There is no guarding.   Musculoskeletal:      Right lower leg: No edema.      Left lower leg: No edema.   Skin:     General: Skin is warm and dry.      Coloration: Skin is not jaundiced.   Neurological:      General: No focal deficit present.      Mental Status: He is alert. Mental status is at baseline.      Motor: No tremor or seizure activity.   Psychiatric:         Behavior: Behavior is cooperative.          Additional Data:     Labs:  Results from last 7 days   Lab Units 01/31/24  0354   WBC Thousand/uL 13.38*   HEMOGLOBIN g/dL 15.2   HEMATOCRIT % 47.5   PLATELETS Thousands/uL 350   NEUTROS PCT % 43   LYMPHS PCT % 42   MONOS PCT % 9   EOS PCT % 5     Results from last 7 days   Lab Units 01/31/24  0354 01/30/24  1533   SODIUM mmol/L 137 137   POTASSIUM mmol/L 3.6 3.6   CHLORIDE mmol/L 104 105   CO2 mmol/L 25 25   BUN mg/dL 10 11   CREATININE mg/dL 0.75 0.74   ANION GAP mmol/L 8 7   CALCIUM mg/dL 9.5 9.6   ALBUMIN g/dL  --  4.4   TOTAL BILIRUBIN mg/dL  --  0.48   ALK PHOS U/L  --  80   ALT U/L  --  26   AST U/L  --  20   GLUCOSE RANDOM mg/dL 99 136     Results from last 7 days   Lab Units 01/31/24  0126   INR  0.99                    Lines/Drains:  Invasive Devices       Peripheral Intravenous Line  Duration             Peripheral IV 01/30/24 Right Forearm <1 day                      Telemetry:  Telemetry Orders (From admission, onward)               24 Hour Telemetry Monitoring  Continuous x 24 Hours (Telem)        Question:  Reason for 24 Hour Telemetry  Answer:  PCI/EP study (including pacer and ICD implementation), Cardiac surgery, MI, abnormal cardiac cath, and chest pain- rule out MI                     Telemetry Reviewed: Normal Sinus Rhythm  Indication for Continued Telemetry Use: Acute MI/Unstable Angina/Rule out ACS             Imaging: No pertinent imaging reviewed.    Recent Cultures (last 7 days):         Last 24 Hours Medication List:   Current Facility-Administered Medications   Medication Dose Route Frequency Provider Last Rate    acetaminophen  650 mg Oral Q6H PRN Satish Bagley MD      carvedilol  25 mg Oral BID With Meals Satish aBgley MD      escitalopram  5 mg Oral Daily Satish Bagley MD      heparin (porcine)  3-20 Units/kg/hr (Order-Specific) Intravenous Titrated Satish Bagley MD 13.1 Units/kg/hr (01/31/24 0839)    heparin (porcine)  2,000 Units Intravenous Q6H PRN Satish Bagley MD      heparin (porcine)  4,000 Units Intravenous Q6H PRN Satish Bagley MD      hydrALAZINE  10 mg Intravenous Q6H PRN Kedar Padron PA-C      hydroCHLOROthiazide  25 mg Oral Daily Satish Bagley MD      losartan  100 mg Oral Daily Satish Bagley MD      nicotine  1 patch Transdermal Daily Satish Bagley MD      ondansetron  4 mg Intravenous Q6H PRN Satish Bagley MD      rosuvastatin  10 mg Oral Daily Satish Bagley MD          Today, Patient Was Seen By: Kedar Padron PA-C    **Please Note: This note may have been constructed using a voice recognition system.**

## 2024-01-31 NOTE — UTILIZATION REVIEW
Initial INPATIENT Stay Review    Admission: Date/Time/Statement: 1/30/24 1720 Observation  AND CHANGED 1/31/24 1043 INPATIENT RE: PATIENT NEEDS > 2 MIDNIGHT STAY DUE TO ELEVATED TROPONIN IN SETTING OF CHEST PAIN PRIOR TO ARRIVAL.  TO CONTINUE HEPARIN DRIP, TREND TROPONIN UNTIL PEAK, TELEMETRY, CARDIOLOGY CONSULTED;HYPERTENSIVE AND MEDICATION ADJUSTED, MONITOR BP    Orders Placed This Encounter   Procedures    Inpatient Admission     Standing Status:   Standing     Number of Occurrences:   1     Order Specific Question:   Level of Care     Answer:   Med Surg [16]     Order Specific Question:   Estimated length of stay     Answer:   More than 2 Midnights     Order Specific Question:   Certification     Answer:   I certify that inpatient services are medically necessary for this patient for a duration of greater than two midnights. See H&P and MD Progress Notes for additional information about the patient's course of treatment.     Admission Orders (From admission, onward)       Ordered        01/31/24 1043  Inpatient Admission  Once                           Date: 1/31/24                          Current Patient Class: INPATIENT  Current Level of Care: med surg     HPI:48 y.o. male initially admitted on 1/30/24 to observation AND CONVERTED 1/31/24 TO INPATIENT  due to  Elevated troponin/Hypertensive Urgency.   Presented due to crushing chest pain, self resolved after about 1 hour, recurrence in ED, self limiting.    Hypertensive to 224/123.   Home antihypertensives adjusted.   Placed on telemetry.  Trend Troponin to peak.  Echo.  Consult Cardiology.     Assessment/Plan CHANGED TO INPATIENT 1/31/24 :   1/31/24 Since admission troponin trending up, BP slowly improving.   Chest pain free.   Loaded with asa, given Heparin bolus and started on Heparin drip.   Exam is non focal.   Wbc 13.38. hs 4 hour tnl 2786, Delta 1388   hs random tnl 2361.   Continue Heparin drip, telemetry.  Echo.   Cardiology consulted.   Continue  current antihypertensive regimen:  on increased Coreg 25 mg BID, home Losartan and HCTZ    1/31/24 per Cardiology - patient with chest pain and elevated troponin and suspect ACS.  + risk factors and chest pain is progressive from stable to unstable angina.   Plan is cardiac cath in am.   Continue on heparin drip, aspirin, beta-blocker, and started on statin, load with Brilinta .   Hypertensive urgency versus emergency:  check echo.  Resume home medications.       2/1/24:  chest pain free.  On exam: obese.   Peak troponin on 1/31/24 2786, random decreased to 2361.  Telemetry sinus.     Plan: continued heparin gtt.  Cath today showed: 50% OM1, 40% ramus, 95% first diagonal disease, status post drug-eluting stent to diagonal lesion. .  Continue aspirin, ticagrelor, and high intensity statin. Telemetry.   Continue current BP regimen.        Vital Signs:   02/01/24 0700 97.9 °F (36.6 °C) 74 18 154/95 120 98 % None (Room air) Lying   01/31/24 2300 97.9 °F (36.6 °C) 70 16 144/85 109 96 % None (Room air) Lying   01/31/24 1859 98.2 °F (36.8 °C) 82 16 153/86 114 98 % None (Room air) Sitting   01/31/24 1614 98.1 °F (36.7 °C) 82 19 190/113 Abnormal  145 96 %       01/31/24 0842 -- -- -- 142/87 -- -- -- Lying   01/31/24 0700 98 °F (36.7 °C) 75 18 216/113 Abnormal  -- 98 %       01/31/24 0217 97.6 °F (36.4 °C) 75 19 162/90 120 98 % -- --   01/30/24 2040 98.2 °F (36.8 °C) 85 18 176/98 Abnormal  -- 97 % None (Room air) Lying   01/30/24 1900 -- 89 -- 189/94 Abnormal  135 98 % -- --   01/30/24 1700 -- 83 18 169/86 120 97 % None (Room air) Sitting   01/30/24 1630 -- 82 18 179/103 Abnormal  136 98 % None (Room air)      Pertinent Labs/Diagnostic Results:   XR chest 1 view portable   ED Interpretation by Dimas Fernandez MD (01/30 1728)   No acute cardiopulmonary disease.  No past comparison      Final Result by Hugo Steinberg MD (01/31 1640)      No acute cardiopulmonary disease.                  Workstation performed: [id number]             1/31/24 ecg Normal sinus rhythm  Rightward axis  Non-specific intra-ventricular conduction block  Abnormal ECG  When compared with ECG of 30-JAN-2024 22:56, (unconfirmed)  No significant change was found    2/1/24 echo  Left Ventricle: Left ventricular cavity size is normal. Wall thickness is mildly increased. The left ventricular ejection fraction is 55%. Systolic function is normal. Wall motion is normal. Diastolic function is mildly abnormal, consistent with grade I (abnormal) relaxation.    Tricuspid Valve: There is mild regurgitation    2/1/24 Cardiac Cath st Mrg lesion is 50% stenosed.    Ramus lesion is 40% stenosed.    1st Diag lesion is 95% stenosed.    The left ventricular systolic function is normal.    The angioplasty was pre-stent angioplasty.   2v CAD  Recommendation DAPT  Lipid Rx  Smoking cessation     Results from last 7 days   Lab Units 01/31/24  0354 01/31/24  0126 01/30/24  1533   WBC Thousand/uL 13.38* 12.22* 12.75*   HEMOGLOBIN g/dL 15.2 15.4 15.8   HEMATOCRIT % 47.5 48.6 48.3   PLATELETS Thousands/uL 350 340 345   NEUTROS ABS Thousands/µL 5.70  --  7.79*     Results from last 7 days   Lab Units 01/31/24  0354 01/30/24  1533   SODIUM mmol/L 137 137   POTASSIUM mmol/L 3.6 3.6   CHLORIDE mmol/L 104 105   CO2 mmol/L 25 25   ANION GAP mmol/L 8 7   BUN mg/dL 10 11   CREATININE mg/dL 0.75 0.74   EGFR ml/min/1.73sq m 110 110   CALCIUM mg/dL 9.5 9.6     Results from last 7 days   Lab Units 01/30/24  1533   AST U/L 20   ALT U/L 26   ALK PHOS U/L 80   TOTAL PROTEIN g/dL 7.7   ALBUMIN g/dL 4.4   TOTAL BILIRUBIN mg/dL 0.48     Results from last 7 days   Lab Units 01/31/24  0354 01/30/24  1533   GLUCOSE RANDOM mg/dL 99 136     Results from last 7 days   Lab Units 01/31/24  0354 01/31/24  0126 01/30/24  2253 01/30/24  1938 01/30/24  1729 01/30/24  1533   HS TNI 0HR ng/L  --   --  1,398*  --   --  22   HS TNI 2HR ng/L  --  2,302*  --   --  54*  --    HSTNI D2 ng/L  --  904*  --   --  32*  --    HS TNI  4HR ng/L 2,786*  --   --  278*  --   --    HSTNI D4 ng/L 1,388*  --   --  256*  --   --      Results from last 7 days   Lab Units 02/01/24  0445 01/31/24  2137 01/31/24  1435 01/31/24  0727 01/31/24  0126   PROTIME seconds  --   --   --   --  13.7   INR   --   --   --   --  0.99   PTT seconds 75* 56* 54*   < > 38*    < > = values in this interval not displayed.     Results from last 7 days   Lab Units 01/30/24  1533   BNP pg/mL 24     Medications:   Scheduled Medications:  carvedilol, 25 mg, Oral, BID With Meals  escitalopram, 5 mg, Oral, Daily  hydroCHLOROthiazide, 25 mg, Oral, Daily  losartan, 100 mg, Oral, Daily  nicotine, 1 patch, Transdermal, Daily  rosuvastatin, 10 mg, Oral, Daily    atorvastatin (LIPITOR) tablet 80 mg  Dose: 80 mg  Freq: Daily with dinner Route: PO  Start: 01/31/24 1630   ticagrelor (BRILINTA) tablet 180 mg  Dose: 180 mg  Freq: Once Route: PO  Start: 01/31/24 1030   ticagrelor (BRILINTA) tablet 90 mg  Dose: 90 mg  Freq: Every 12 hours scheduled Route: PO  Start: 01/31/24 2200     aspirin tablet 325 mg  Dose: 325 mg  Freq: Once Route: PO  Start: 01/31/24 0015 End: 01/31/24 0130   heparin (porcine) injection 4,000 Units  Dose: 4,000 Units  Freq: Once Route: IV  Start: 01/31/24 0015 End: 01/31/24 0132     aspirin chewable tablet 81 mg  Dose: 81 mg  Freq: Daily Route: PO  Start: 02/01/24 0900     Continuous IV Infusions:  heparin (porcine), 3-20 Units/kg/hr (Order-Specific), Intravenous, Titrated      PRN Meds:  acetaminophen, 650 mg, Oral, Q6H PRN  heparin (porcine), 2,000 Units, Intravenous, Q6H PRN x 3 1/31/24 at 0835, 1536, 2230   heparin (porcine), 4,000 Units, Intravenous, Q6H PRN  hydrALAZINE, 10 mg, Intravenous, Q6H PRN  ondansetron, 4 mg, Intravenous, Q6H PRN      Telemetry     Discharge Plan: to be determined.     Network Utilization Review Department  ATTENTION: Please call with any questions or concerns to [phone] and carefully listen to the prompts so that you are directed to  the right person. All voicemails are confidential.   For Discharge needs, contact Care Management DC Support Team at [phone] opt. 2  Send all requests for admission clinical reviews, approved or denied determinations and any other requests to dedicated fax number below belonging to the campus where the patient is receiving treatment. List of dedicated fax numbers for the Facilities:  FACILITY NAME UR FAX NUMBER   ADMISSION DENIALS (Administrative/Medical Necessity) [phone]   DISCHARGE SUPPORT TEAM (NETWORK) [phone]   PARENT CHILD HEALTH (Maternity/NICU/Pediatrics) [phone]   [facility] [phone]   [facility] [phone]   [facility] [phone]   [facility] [phone]   [facility] [phone]   [facility] [phone]   [facility] [phone]   [facility] [phone]   [facility] [phone]   [facility] [phone]   [facility] [phone]   [facility] [phone]

## 2024-01-31 NOTE — ASSESSMENT & PLAN NOTE
Patient's blood pressure has not been optimized.  Blood pressure on admission 224/123.  Blood pressure improved to 176/98.  Continue home blood pressure, with increasing carvedilol to 25 mg.

## 2024-01-31 NOTE — H&P
[facility]  H&P  Name: Gerry Cali 48 y.o. male I MRN: [id number]  Unit/Bed#: -01 I Date of Admission: 1/30/2024   Date of Service: 1/30/2024 I Hospital Day: 0      Assessment/Plan   * Elevated troponin level not due to acute coronary syndrome  Assessment & Plan  The patient is chest pain resolved.  This is most likely demand ischemia because of significantly high blood pressure.  Will continue to trend troponin to peak.  Will continue to trend EKGs.  Resume blood pressure medications, losartan 100 mg, hydrochlorothiazide 25 mg, increase carvedilol to 25 mg.  Give one-time dose now and assess the blood pressure in 3 hours.  Routine echocardiography ordered to be done in the morning.  Cardiology consult placed for further evaluation in the morning.      Hypertensive urgency  Assessment & Plan  Patient's blood pressure has not been optimized.  Blood pressure on admission 224/123.  Blood pressure improved to 176/98.  Continue home blood pressure, with increasing carvedilol to 25 mg.    Obesity, morbid, BMI 40.0-49.9 ([facility])  Assessment & Plan  The patient would benefit to obesity clinic as an outpatient.           Possible non-STEMI  -Troponin is mildly trending up.  -The patient is still endorses no chest pain.   -Blood pressure started to trend down slowly.  -For patient was loaded with aspirin 325 mg, IV heparin ACS gtt. Started  -Routine TTE to be done in the morning.  -Trend troponin to peak.  Continue to trend EKGs.  -Continue with lowering of blood pressure.  -Cardiology will consult on-call was contacted via Tiger text, who is in agreement with above mentioned plan at this time.    VTE Prophylaxis:  Not needed because the patient is ambulatory.      Code Status: Full  POLST: POLST is not applicable to this patient  Discussion with family: The patient wife at the bedside.    Anticipated Length of Stay:  Patient will be admitted on an Observation basis with an anticipated length of  stay of less than 2 midnights.   Justification for Hospital Stay: Close monitoring for chest pain with elevated troponin.    Total Time for Visit, including Counseling / Coordination of Care: 30 minutes.  Greater than 50% of this total time spent on direct patient counseling and coordination of care.    Chief Complaint:   Chest pain    History of Present Illness:    Gerry Cali is a 48 y.o. male with PMH of hypertension, hyperlipidemia, anxiety, who presents with a significant central chest pain, described by the patient as a heartburn, no exacerbation or relieving factors, the pain is radiated to patient's bilateral jaws and bilateral shoulders,.  At the time of my encounter the patient said that his chest pain resolved however he was found to have a troponin that was trending up, no ischemic EKG changes, his blood pressure was noticed to be significantly elevated as well.  He will be admitted to observation for further monitoring.    Review of Systems:    Constitutional:  Negative for chills and fever.   HENT:  Negative for rhinorrhea and sinus pressure.    Respiratory:  Negative for chest tightness and shortness of breath.    Cardiovascular:  Positive for chest pain. Negative for palpitations and leg swelling.   Gastrointestinal:  Negative for abdominal pain, diarrhea, nausea and vomiting.   Genitourinary:  Negative for difficulty urinating.   Musculoskeletal:  Negative for arthralgias and myalgias.   Neurological:  Positive for headaches. Negative for dizziness, weakness and numbness.   Psychiatric/Behavioral:  Negative for agitation.        Past Medical and Surgical History:     Past Medical History:   Diagnosis Date    Hyperlipidemia     Hypertension     Nicotine dependence        History reviewed. No pertinent surgical history.    Meds/Allergies:    Prior to Admission medications    Medication Sig Start Date End Date Taking? Authorizing Provider   carvedilol (COREG) 12.5 mg tablet TAKE 1 TABLET BY MOUTH  "TWICE A DAY WITH FOOD 7/20/22   Gael Arellano MD   escitalopram (LEXAPRO) 5 mg tablet TAKE 1 TABLET (5 MG TOTAL) BY MOUTH DAILY. 12/4/23   JOYCE Marshall   hydrochlorothiazide (HYDRODIURIL) 25 mg tablet TAKE 1 TABLET (25 MG TOTAL) BY MOUTH DAILY. 7/20/22   Gael Arellano MD   losartan (COZAAR) 100 MG tablet Take 1 tablet (100 mg total) by mouth daily 10/26/22 4/20/23  Gael Arellano MD   rosuvastatin (CRESTOR) 10 MG tablet Take 1 tablet (10 mg total) by mouth daily 6/7/23   JOYCE Marshall     I have reviewed home medications with patient family member.    Allergies:   Allergies   Allergen Reactions    Aspirin Rash       Social History:     Marital Status: Single   Occupation: .  Patient Pre-hospital Living Situation: Home with family.  Patient Pre-hospital Level of Mobility: Mobile.  Patient Pre-hospital Diet Restrictions: None.  Substance Use History:   Social History     Substance and Sexual Activity   Alcohol Use Yes    Comment: social alcohol use     Social History     Tobacco Use   Smoking Status Some Days    Types: Cigars   Smokeless Tobacco Never     Social History     Substance and Sexual Activity   Drug Use No       Family History:    History of myocardial infarction in the patient's paternal side.      Vitals:   Blood Pressure: (!) 176/98 (01/30/24 2040)  Pulse: 85 (01/30/24 2040)  Temperature: 98.2 °F (36.8 °C) (01/30/24 2040)  Temp Source: Oral (01/30/24 2040)  Respirations: 18 (01/30/24 2040)  Height: 5' 7\" (170.2 cm) (01/30/24 2040)  Weight - Scale: 113 kg (250 lb) (01/30/24 2040)  SpO2: 97 % (01/30/24 2040)        Physical Exam  Vitals and nursing note reviewed.   The patient is in no acute distress, talks in full sentence.  Cardiovascular:      Rate and Rhythm: Normal rate and regular rhythm.   Pulmonary:      Effort: Pulmonary effort is normal. No respiratory distress.      Breath sounds: Normal breath sounds. No stridor. No wheezing.   Abdominal:      General: Abdomen " is flat. Bowel sounds are normal. There is no distension.      Palpations: There is no mass.      Tenderness: There is no guarding.   Musculoskeletal:         General: No swelling or tenderness. Normal range of motion.   Skin:     General: Skin is warm and dry.   Neurological:      General: No focal deficit present.      Mental Status: He is alert and oriented to person, place, and time. Mental status is at baseline.       Additional Data:     Lab Results:     Results from last 7 days   Lab Units 01/30/24  1533   WBC Thousand/uL 12.75*   HEMOGLOBIN g/dL 15.8   HEMATOCRIT % 48.3   PLATELETS Thousands/uL 345   NEUTROS PCT % 61   LYMPHS PCT % 29   MONOS PCT % 5   EOS PCT % 3     Results from last 7 days   Lab Units 01/30/24  1533   SODIUM mmol/L 137   POTASSIUM mmol/L 3.6   CHLORIDE mmol/L 105   CO2 mmol/L 25   BUN mg/dL 11   CREATININE mg/dL 0.74   ANION GAP mmol/L 7   CALCIUM mg/dL 9.6   ALBUMIN g/dL 4.4   TOTAL BILIRUBIN mg/dL 0.48   ALK PHOS U/L 80   ALT U/L 26   AST U/L 20   GLUCOSE RANDOM mg/dL 136                       Imaging:     XR chest 1 view portable   ED Interpretation by Dimas Fernanedz MD (01/30 1728)   No acute cardiopulmonary disease.  No past comparison          EKG, Pathology, and Other Studies Reviewed on Admission:   EKG: Sinus rhythm, rate 80 beats per minutes, normal axis, prolonged QRS, T wave inversion in V1, no significant ST-T wave ischemic changes.    Allscripts / Epic Records Reviewed: Yes     ** Please Note: This note has been constructed using a voice recognition system. **

## 2024-01-31 NOTE — ASSESSMENT & PLAN NOTE
POA, patient had chest pain intermittently leading up to ED arrival described as heartburn radiating to his arms and neck/jaw. It has been resolved since   Initial troponin undetectable at 22, trended up to 2,786 at peak, and now most recently 2,361.   Remains chest pain free since arrival, no EKG changes   He is on heparin drip due to elevated troponin   Cardiology consulted   Maintain telemetry   Follow up with repeat ECHO  Continue Heparin until cardiology evaluates  Continue current antihypertensive regimen   Coreg was increased to 25 mg BID this admission   Continue Losartan and HCTZ at home doses  PRN added

## 2024-01-31 NOTE — UTILIZATION REVIEW
Initial Clinical Review    Admission: Date/Time/Statement: 1/30/24 1720 Observation   Admission Orders (From admission, onward)       Ordered        01/30/24 1720  Place in Observation  Once                          Orders Placed This Encounter   Procedures    Place in Observation     Standing Status:   Standing     Number of Occurrences:   1     Order Specific Question:   Level of Care     Answer:   Med Surg [16]     ED Arrival Information       Expected   -    Arrival   1/30/2024 15:04    Acuity   Emergent              Means of arrival   Walk-In    Escorted by   Spouse    Service   Hospitalist    Admission type   Emergency              Arrival complaint   chest pain / left arm /             Chief Complaint   Patient presents with    Chest Pain     Patient reports burning sensation in chest that started around 1100 this morning, pain radiates down left arm, history of HTN       Initial Presentation: 48 y.o. male from home to ED admitted inpatient due to Elevated troponin/Hypertensive Urgency.  PMH of hypertension, hyperlipidemia, anxiety, Morbid Obesity.  Presented due to chest pain, crushing in upper chest radiating to left arm and jaw starting 1 hour prior to arrival.   Pain resolved in waiting room, returned in ED and self resolved in few minutes.   On exam: obese.  Hypertensive.  Wbc 12.75.   Hs Tnl 22> 54/32> 278/256.  Ecg showed normal then next showed  incomplete RBBB.    Plan:  trend troponin to peak.  Telemetry.  Echo.  Consult Cardiology.    Resume blood pressure medications, losartan 100 mg, hydrochlorothiazide 25 mg, increase carvedilol to 25 mg.  Give one-time dose now and assess the blood pressure in 3 hours.       ED Triage Vitals   Temperature Pulse Respirations Blood Pressure SpO2   01/30/24 1619 01/30/24 1530 01/30/24 1530 01/30/24 1530 01/30/24 1530   98.6 °F (37 °C) 91 18 (!) 224/123 98 %      Temp Source Heart Rate Source Patient Position - Orthostatic VS BP Location FiO2 (%)   01/30/24 1619  01/30/24 1530 01/30/24 1530 01/30/24 1530 --   Oral Monitor Sitting Left arm       Pain Score       01/30/24 2040       No Pain          Wt Readings from Last 1 Encounters:   01/30/24 113 kg (250 lb)     Additional Vital Signs:   01/31/24 0217 97.6 °F (36.4 °C) 75 19 162/90 120 98 % -- --   01/30/24 2040 98.2 °F (36.8 °C) 85 18 176/98 Abnormal  -- 97 % None (Room air) Lying   01/30/24 1900 -- 89 -- 189/94 Abnormal  135 98 % -- --   01/30/24 1700 -- 83 18 169/86 120 97 % None (Room air) Sitting   01/30/24 1630 -- 82 18 179/103 Abnormal  136 98 % None (Room air) Lying   01/30/24 1619 98.6 °F (37 °C) -- -- -- -- -- -- --   01/30/24 1615 -- 86 16 195/107 Abnormal  144 97 % None (Room air)      Pertinent Labs/Diagnostic Test Results:   XR chest 1 view portable   ED Interpretation by Dimas Fernandez MD (01/30 1728)   No acute cardiopulmonary disease.  No past comparison        1/30/24 1620 ecg Interpretation: abnormal    Rate:     ECG rate:  88     ECG rate assessment: normal    Rhythm:     Rhythm: sinus rhythm    Ectopy:     Ectopy: none    QRS:     QRS axis:  Normal     QRS intervals:  Normal   Conduction:     Conduction: normal    ST segments:     ST segments:  Normal   T waves:     T waves: normal     1/30/24 1742 ecg  Interpretation: abnormal    Rate:     ECG rate:  80     ECG rate assessment: normal    Rhythm:     Rhythm: sinus rhythm    Ectopy:     Ectopy: none    QRS:     QRS axis:  Normal     QRS intervals:  Normal   Conduction:     Conduction: abnormal      Abnormal conduction: incomplete RBBB    ST segments:     ST segments:  Normal   T waves:     T waves: normal     Results from last 7 days   Lab Units 01/31/24  0354 01/31/24  0126 01/30/24  1533   WBC Thousand/uL 13.38* 12.22* 12.75*   HEMOGLOBIN g/dL 15.2 15.4 15.8   HEMATOCRIT % 47.5 48.6 48.3   PLATELETS Thousands/uL 350 340 345   NEUTROS ABS Thousands/µL 5.70  --  7.79*     Results from last 7 days   Lab Units 01/31/24  0354 01/30/24  1533   SODIUM  mmol/L 137 137   POTASSIUM mmol/L 3.6 3.6   CHLORIDE mmol/L 104 105   CO2 mmol/L 25 25   ANION GAP mmol/L 8 7   BUN mg/dL 10 11   CREATININE mg/dL 0.75 0.74   EGFR ml/min/1.73sq m 110 110   CALCIUM mg/dL 9.5 9.6     Results from last 7 days   Lab Units 01/30/24  1533   AST U/L 20   ALT U/L 26   ALK PHOS U/L 80   TOTAL PROTEIN g/dL 7.7   ALBUMIN g/dL 4.4   TOTAL BILIRUBIN mg/dL 0.48     Results from last 7 days   Lab Units 01/31/24  0354 01/30/24  1533   GLUCOSE RANDOM mg/dL 99 136     Results from last 7 days   Lab Units 01/31/24  0354 01/31/24  0126 01/30/24  2253 01/30/24  1938 01/30/24  1729 01/30/24  1533   HS TNI 0HR ng/L  --   --  1,398*  --   --  22   HS TNI 2HR ng/L  --  2,302*  --   --  54*  --    HSTNI D2 ng/L  --  904*  --   --  32*  --    HS TNI 4HR ng/L 2,786*  --   --  278*  --   --    HSTNI D4 ng/L 1,388*  --   --  256*  --   --      Results from last 7 days   Lab Units 01/31/24  0126   PROTIME seconds 13.7   INR  0.99   PTT seconds 38*     Results from last 7 days   Lab Units 01/30/24  1533   BNP pg/mL 24       ED Treatment:   Medication Administration from 01/30/2024 1504 to 01/30/2024 2037         Date/Time Order Dose Route Action Comments     01/30/2024 1648 EST famotidine (PEPCID) tablet 20 mg 20 mg Oral Given --          Past Medical History:   Diagnosis Date    Hyperlipidemia     Hypertension     Nicotine dependence      Present on Admission:   Elevated troponin level not due to acute coronary syndrome   Obesity, morbid, BMI 40.0-49.9 ([facility])      Admitting Diagnosis: Chest pain, unspecified type [R07.9]  Age/Sex: 48 y.o. male  Admission Orders:  Scheduled Medications:  carvedilol, 25 mg, Oral, BID With Meals  escitalopram, 5 mg, Oral, Daily  hydroCHLOROthiazide, 25 mg, Oral, Daily  losartan, 100 mg, Oral, Daily  nicotine, 1 patch, Transdermal, Daily  rosuvastatin, 10 mg, Oral, Daily    aspirin tablet 325 mg  Dose: 325 mg  Freq: Once Route: PO  Start: 01/31/24 0015 End: 01/31/24 0130   heparin  (porcine) injection 4,000 Units  Dose: 4,000 Units  Freq: Once Route: IV  Start: 01/31/24 0015 End: 01/31/24 0132     carvedilol (COREG) tablet 25 mg  Dose: 25 mg  Freq: Once Route: PO  Start: 01/30/24 2115 End: 01/30/24 2241   hydroCHLOROthiazide tablet 25 mg  Dose: 25 mg  Freq: Once Route: PO  Start: 01/30/24 2115 End: 01/30/24 2241     losartan (COZAAR) tablet 100 mg  Dose: 100 mg  Freq: Once Route: PO  Start: 01/30/24 2115 End: 01/30/24 2241       Continuous IV Infusions:  heparin (porcine), 3-20 Units/kg/hr (Order-Specific), Intravenous, Titrated      PRN Meds: not used.   acetaminophen, 650 mg, Oral, Q6H PRN  heparin (porcine), 2,000 Units, Intravenous, Q6H PRN  heparin (porcine), 4,000 Units, Intravenous, Q6H PRN  ondansetron, 4 mg, Intravenous, Q6H PRN    Telemetry     IP CONSULT TO CARDIOLOGY    Network Utilization Review Department  ATTENTION: Please call with any questions or concerns to [phone] and carefully listen to the prompts so that you are directed to the right person. All voicemails are confidential.   For Discharge needs, contact Care Management DC Support Team at [phone] opt. 2  Send all requests for admission clinical reviews, approved or denied determinations and any other requests to dedicated fax number below belonging to the campus where the patient is receiving treatment. List of dedicated fax numbers for the Facilities:  FACILITY NAME UR FAX NUMBER   ADMISSION DENIALS (Administrative/Medical Necessity) [phone]   DISCHARGE SUPPORT TEAM (NETWORK) [phone]   PARENT CHILD HEALTH (Maternity/NICU/Pediatrics) [phone]   [facility] [phone]   [facility] [phone]   Critical access hospital [phone]   [facility] [phone]   [facility] [phone]   [facility] [phone]   [facility]  [phone]   [facility] [phone]   [facility] [phone]   [facility] [phone]   [facility] [phone]   [facility] [phone]

## 2024-02-01 ENCOUNTER — APPOINTMENT (INPATIENT)
Dept: NON INVASIVE DIAGNOSTICS | Facility: HOSPITAL | Age: 49
DRG: 175 | End: 2024-02-01
Payer: COMMERCIAL

## 2024-02-01 PROBLEM — R07.9 CHEST PAIN: Status: ACTIVE | Noted: 2020-11-18

## 2024-02-01 PROBLEM — I25.10 CORONARY ARTERY DISEASE INVOLVING NATIVE CORONARY ARTERY: Status: ACTIVE | Noted: 2024-02-01

## 2024-02-01 LAB
ANION GAP SERPL CALCULATED.3IONS-SCNC: 7 MMOL/L
AORTIC ROOT: 3 CM
APICAL FOUR CHAMBER EJECTION FRACTION: 54 %
APTT PPP: 66 SECONDS (ref 23–37)
APTT PPP: 75 SECONDS (ref 23–37)
BSA FOR ECHO PROCEDURE: 2.22 M2
BUN SERPL-MCNC: 15 MG/DL (ref 5–25)
CALCIUM SERPL-MCNC: 10 MG/DL (ref 8.4–10.2)
CHLORIDE SERPL-SCNC: 103 MMOL/L (ref 96–108)
CHOLEST SERPL-MCNC: 184 MG/DL
CO2 SERPL-SCNC: 27 MMOL/L (ref 21–32)
CREAT SERPL-MCNC: 0.76 MG/DL (ref 0.6–1.3)
E WAVE DECELERATION TIME: 216 MS
E/A RATIO: 0.76
EST. AVERAGE GLUCOSE BLD GHB EST-MCNC: 117 MG/DL
FRACTIONAL SHORTENING: 22 (ref 28–44)
GFR SERPL CREATININE-BSD FRML MDRD: 109 ML/MIN/1.73SQ M
GLUCOSE SERPL-MCNC: 103 MG/DL (ref 65–140)
HBA1C MFR BLD: 5.7 %
HDLC SERPL-MCNC: 28 MG/DL
INTERVENTRICULAR SEPTUM IN DIASTOLE (PARASTERNAL SHORT AXIS VIEW): 1.3 CM
INTERVENTRICULAR SEPTUM: 1.3 CM (ref 0.6–1.1)
KCT BLD-ACNC: 421 SEC (ref 89–137)
LAAS-AP2: 21.5 CM2
LAAS-AP4: 15.2 CM2
LDLC SERPL CALC-MCNC: 88 MG/DL (ref 0–100)
LEFT ATRIUM SIZE: 3.9 CM
LEFT ATRIUM VOLUME (MOD BIPLANE): 53 ML
LEFT ATRIUM VOLUME INDEX (MOD BIPLANE): 23.9 ML/M2
LEFT INTERNAL DIMENSION IN SYSTOLE: 3.5 CM (ref 2.1–4)
LEFT VENTRICULAR INTERNAL DIMENSION IN DIASTOLE: 4.5 CM (ref 3.5–6)
LEFT VENTRICULAR POSTERIOR WALL IN END DIASTOLE: 1.2 CM
LEFT VENTRICULAR STROKE VOLUME: 41 ML
LVSV (TEICH): 41 ML
MV E'TISSUE VEL-SEP: 6 CM/S
MV PEAK A VEL: 0.68 M/S
MV PEAK E VEL: 52 CM/S
MV STENOSIS PRESSURE HALF TIME: 63 MS
MV VALVE AREA P 1/2 METHOD: 3.49
NONHDLC SERPL-MCNC: 156 MG/DL
POTASSIUM SERPL-SCNC: 4 MMOL/L (ref 3.5–5.3)
RIGHT ATRIUM AREA SYSTOLE A4C: 13.7 CM2
RIGHT VENTRICLE ID DIMENSION: 3.3 CM
SL CV LEFT ATRIUM LENGTH A2C: 5 CM
SL CV LV EF: 55
SL CV PED ECHO LEFT VENTRICLE DIASTOLIC VOLUME (MOD BIPLANE) 2D: 94 ML
SL CV PED ECHO LEFT VENTRICLE SYSTOLIC VOLUME (MOD BIPLANE) 2D: 52 ML
SODIUM SERPL-SCNC: 137 MMOL/L (ref 135–147)
SPECIMEN SOURCE: ABNORMAL
TRICUSPID ANNULAR PLANE SYSTOLIC EXCURSION: 1.7 CM
TRIGL SERPL-MCNC: 342 MG/DL

## 2024-02-01 PROCEDURE — C1769 GUIDE WIRE: HCPCS | Performed by: INTERNAL MEDICINE

## 2024-02-01 PROCEDURE — C1894 INTRO/SHEATH, NON-LASER: HCPCS | Performed by: INTERNAL MEDICINE

## 2024-02-01 PROCEDURE — 80048 BASIC METABOLIC PNL TOTAL CA: CPT | Performed by: NURSE PRACTITIONER

## 2024-02-01 PROCEDURE — 027034Z DILATION OF CORONARY ARTERY, ONE ARTERY WITH DRUG-ELUTING INTRALUMINAL DEVICE, PERCUTANEOUS APPROACH: ICD-10-PCS | Performed by: INTERNAL MEDICINE

## 2024-02-01 PROCEDURE — 92928 PRQ TCAT PLMT NTRAC ST 1 LES: CPT | Performed by: INTERNAL MEDICINE

## 2024-02-01 PROCEDURE — 93458 L HRT ARTERY/VENTRICLE ANGIO: CPT | Performed by: INTERNAL MEDICINE

## 2024-02-01 PROCEDURE — 99232 SBSQ HOSP IP/OBS MODERATE 35: CPT | Performed by: PHYSICIAN ASSISTANT

## 2024-02-01 PROCEDURE — 93306 TTE W/DOPPLER COMPLETE: CPT

## 2024-02-01 PROCEDURE — C9600 PERC DRUG-EL COR STENT SING: HCPCS | Performed by: INTERNAL MEDICINE

## 2024-02-01 PROCEDURE — 93306 TTE W/DOPPLER COMPLETE: CPT | Performed by: INTERNAL MEDICINE

## 2024-02-01 PROCEDURE — 99152 MOD SED SAME PHYS/QHP 5/>YRS: CPT | Performed by: INTERNAL MEDICINE

## 2024-02-01 PROCEDURE — 99153 MOD SED SAME PHYS/QHP EA: CPT | Performed by: INTERNAL MEDICINE

## 2024-02-01 PROCEDURE — C1725 CATH, TRANSLUMIN NON-LASER: HCPCS | Performed by: INTERNAL MEDICINE

## 2024-02-01 PROCEDURE — 85730 THROMBOPLASTIN TIME PARTIAL: CPT | Performed by: INTERNAL MEDICINE

## 2024-02-01 PROCEDURE — C1874 STENT, COATED/COV W/DEL SYS: HCPCS | Performed by: INTERNAL MEDICINE

## 2024-02-01 PROCEDURE — 85347 COAGULATION TIME ACTIVATED: CPT

## 2024-02-01 PROCEDURE — 99233 SBSQ HOSP IP/OBS HIGH 50: CPT | Performed by: INTERNAL MEDICINE

## 2024-02-01 PROCEDURE — 4A023N7 MEASUREMENT OF CARDIAC SAMPLING AND PRESSURE, LEFT HEART, PERCUTANEOUS APPROACH: ICD-10-PCS | Performed by: INTERNAL MEDICINE

## 2024-02-01 PROCEDURE — 80061 LIPID PANEL: CPT | Performed by: NURSE PRACTITIONER

## 2024-02-01 PROCEDURE — C1887 CATHETER, GUIDING: HCPCS | Performed by: INTERNAL MEDICINE

## 2024-02-01 DEVICE — XIENCE SKYPOINT™ EVEROLIMUS ELUTING CORONARY STENT SYSTEM 2.50 MM X 15 MM / RAPID-EXCHANGE
Type: IMPLANTABLE DEVICE | Status: FUNCTIONAL
Brand: XIENCE SKYPOINT™

## 2024-02-01 RX ORDER — NITROGLYCERIN 0.4 MG/1
0.4 TABLET SUBLINGUAL
Status: DISCONTINUED | OUTPATIENT
Start: 2024-02-01 | End: 2024-02-02 | Stop reason: HOSPADM

## 2024-02-01 RX ORDER — CLOPIDOGREL BISULFATE 75 MG/1
600 TABLET ORAL ONCE
Status: COMPLETED | OUTPATIENT
Start: 2024-02-01 | End: 2024-02-01

## 2024-02-01 RX ORDER — LIDOCAINE HYDROCHLORIDE 20 MG/ML
INJECTION, SOLUTION EPIDURAL; INFILTRATION; INTRACAUDAL; PERINEURAL CODE/TRAUMA/SEDATION MEDICATION
Status: DISCONTINUED | OUTPATIENT
Start: 2024-02-01 | End: 2024-02-01 | Stop reason: HOSPADM

## 2024-02-01 RX ORDER — SODIUM CHLORIDE 9 MG/ML
150 INJECTION, SOLUTION INTRAVENOUS CONTINUOUS
Status: DISCONTINUED | OUTPATIENT
Start: 2024-02-01 | End: 2024-02-02

## 2024-02-01 RX ORDER — NITROGLYCERIN 20 MG/100ML
INJECTION INTRAVENOUS CODE/TRAUMA/SEDATION MEDICATION
Status: DISCONTINUED | OUTPATIENT
Start: 2024-02-01 | End: 2024-02-01 | Stop reason: HOSPADM

## 2024-02-01 RX ORDER — HEPARIN SODIUM 1000 [USP'U]/ML
INJECTION, SOLUTION INTRAVENOUS; SUBCUTANEOUS CODE/TRAUMA/SEDATION MEDICATION
Status: DISCONTINUED | OUTPATIENT
Start: 2024-02-01 | End: 2024-02-01 | Stop reason: HOSPADM

## 2024-02-01 RX ORDER — CLOPIDOGREL BISULFATE 75 MG/1
75 TABLET ORAL DAILY
Status: DISCONTINUED | OUTPATIENT
Start: 2024-02-02 | End: 2024-02-02 | Stop reason: HOSPADM

## 2024-02-01 RX ORDER — FENTANYL CITRATE 50 UG/ML
INJECTION, SOLUTION INTRAMUSCULAR; INTRAVENOUS CODE/TRAUMA/SEDATION MEDICATION
Status: DISCONTINUED | OUTPATIENT
Start: 2024-02-01 | End: 2024-02-01 | Stop reason: HOSPADM

## 2024-02-01 RX ORDER — ONDANSETRON 2 MG/ML
4 INJECTION INTRAMUSCULAR; INTRAVENOUS EVERY 6 HOURS PRN
Status: CANCELLED | OUTPATIENT
Start: 2024-02-01

## 2024-02-01 RX ORDER — MIDAZOLAM HYDROCHLORIDE 2 MG/2ML
INJECTION, SOLUTION INTRAMUSCULAR; INTRAVENOUS CODE/TRAUMA/SEDATION MEDICATION
Status: DISCONTINUED | OUTPATIENT
Start: 2024-02-01 | End: 2024-02-01 | Stop reason: HOSPADM

## 2024-02-01 RX ORDER — VERAPAMIL HYDROCHLORIDE 2.5 MG/ML
INJECTION, SOLUTION INTRAVENOUS CODE/TRAUMA/SEDATION MEDICATION
Status: DISCONTINUED | OUTPATIENT
Start: 2024-02-01 | End: 2024-02-01 | Stop reason: HOSPADM

## 2024-02-01 RX ORDER — ACETAMINOPHEN 325 MG/1
650 TABLET ORAL EVERY 4 HOURS PRN
Status: CANCELLED | OUTPATIENT
Start: 2024-02-01

## 2024-02-01 RX ADMIN — ESCITALOPRAM OXALATE 5 MG: 10 TABLET ORAL at 08:40

## 2024-02-01 RX ADMIN — TICAGRELOR 90 MG: 90 TABLET ORAL at 08:40

## 2024-02-01 RX ADMIN — CLOPIDOGREL BISULFATE 600 MG: 75 TABLET ORAL at 21:01

## 2024-02-01 RX ADMIN — SODIUM CHLORIDE 150 ML/HR: 0.9 INJECTION, SOLUTION INTRAVENOUS at 12:44

## 2024-02-01 RX ADMIN — ATORVASTATIN CALCIUM 80 MG: 40 TABLET, FILM COATED ORAL at 16:48

## 2024-02-01 RX ADMIN — CARVEDILOL 12.5 MG: 12.5 TABLET, FILM COATED ORAL at 16:48

## 2024-02-01 RX ADMIN — HYDROCHLOROTHIAZIDE 25 MG: 25 TABLET ORAL at 08:40

## 2024-02-01 RX ADMIN — CARVEDILOL 12.5 MG: 12.5 TABLET, FILM COATED ORAL at 08:42

## 2024-02-01 RX ADMIN — ASPIRIN 81 MG 81 MG: 81 TABLET ORAL at 08:39

## 2024-02-01 RX ADMIN — LOSARTAN POTASSIUM 100 MG: 50 TABLET, FILM COATED ORAL at 08:40

## 2024-02-01 NOTE — ASSESSMENT & PLAN NOTE
POA, patient had chest pain intermittently leading up to ED arrival described as heartburn radiating to his arms and neck/jaw.   Initial troponin undetectable at 22, trended up to 2,786 at peak, and now most recently 2,361.  Cardiology consulted  Remains chest pain free since arrival, no EKG changes   He is on heparin drip due to elevated troponin with plans to proceed to cardiac catheterization today  Maintain telemetry   Follow up results of 2d echo  Continue aspirin/brilinta, statin, beta-blocker

## 2024-02-01 NOTE — PROGRESS NOTES
General Cardiology   Progress Note -  Team One   Gerry Cali 48 y.o. male MRN: [id number]  Unit/Bed#: S -01 Encounter: [id number]    Assessment    Chest pain with elevated troponin-type II MI in the setting of hypertensive emergency versus NSTEMI  Presented with chest pain radiating into his jaw associated with exertion and relieved with rest  Hs troponin up to 2700  ECG-NSR with nonspecific IVCD  Blood pressure elevated to 24/123 on admission, improving  No symptoms over past 24 hours  TTE pending  Placed on IV heparin and s/p aspirin and Brilinta load    Hypertensive emergency in setting of medication noncompliance  BP improving, systolics in the 140s to 150s over past 24 hours.  On carvedilol 12.5 mg twice daily, hydrochlorothiazide 25 mg daily, and losartan 100 mg daily    HLD-, triglycerides 342, HDL 28, LDL 88.  On rosuvastatin 10 mg daily at home, substituted with atorvastatin 80 mg daily since rosuvastatin is nonformulary    Obesity-BMI 39.16    Tobacco abuse-previously smoked 2 to 3 packs of cigarettes per day.  Now smoking black and milds.    Suspected NICKI-outpatient sleep study    Plan  NPO for cardiac catheterization today to define coronary anatomy  Continue IV heparin, aspirin, ticagrelor, and high intensity statin pending cath results  Follow-up on echocardiogram results  For now, continue current BP regimen.  Will consider increasing medications once results of catheterization are known.  Continue telemetry monitoring    Subjective  Review of Systems   Constitutional: Negative for chills, malaise/fatigue and weight gain.   Cardiovascular:  Negative for chest pain, dyspnea on exertion, leg swelling, orthopnea, palpitations and syncope.   Respiratory:  Negative for cough, shortness of breath, sleep disturbances due to breathing and sputum production.    Endocrine: Negative.    Hematologic/Lymphatic: Negative.    Gastrointestinal:  Negative for bloating and nausea.   Genitourinary:  "Negative.    Neurological:  Negative for dizziness, light-headedness and weakness.   Psychiatric/Behavioral:  Negative for altered mental status.    All other systems reviewed and are negative.    Objective:   Vitals: Blood pressure 154/95, pulse 74, temperature 97.9 °F (36.6 °C), temperature source Oral, resp. rate 18, height 5' 7\" (1.702 m), weight 113 kg (250 lb), SpO2 98%., Body mass index is 39.16 kg/m².,     Systolic (24hrs), Av , Min:144 , Max:190     Diastolic (24hrs), Av, Min:85, Max:113      Intake/Output Summary (Last 24 hours) at 2024 0958  Last data filed at 2024 2100  Gross per 24 hour   Intake 0 ml   Output --   Net 0 ml     Wt Readings from Last 3 Encounters:   24 113 kg (250 lb)   23 115 kg (254 lb)   23 114 kg (252 lb)     Telemetry Review: NSR    Physical Exam  Vitals reviewed.   Constitutional:       General: He is not in acute distress.     Appearance: He is obese.   Neck:      Vascular: No hepatojugular reflux or JVD.   Cardiovascular:      Rate and Rhythm: Normal rate and regular rhythm.      Pulses: Normal pulses.      Heart sounds: Normal heart sounds. No murmur heard.     No friction rub. No gallop.   Pulmonary:      Effort: Pulmonary effort is normal. No respiratory distress.      Breath sounds: No rales.   Abdominal:      General: Bowel sounds are normal. There is no distension.      Palpations: Abdomen is soft.      Tenderness: There is no abdominal tenderness.   Musculoskeletal:         General: No tenderness. Normal range of motion.      Cervical back: Neck supple.      Right lower leg: No edema.      Left lower leg: No edema.   Skin:     General: Skin is warm and dry.      Capillary Refill: Capillary refill takes less than 2 seconds.      Findings: No erythema.   Neurological:      Mental Status: He is alert and oriented to person, place, and time.   Psychiatric:         Mood and Affect: Mood normal.     LABORATORY RESULTS      CBC with diff: "   Results from last 7 days   Lab Units 01/31/24  0354 01/31/24  0126 01/30/24  1533   WBC Thousand/uL 13.38* 12.22* 12.75*   HEMOGLOBIN g/dL 15.2 15.4 15.8   HEMATOCRIT % 47.5 48.6 48.3   MCV fL 77* 78* 77*   PLATELETS Thousands/uL 350 340 345   RBC Million/uL 6.20* 6.20* 6.29*   MCH pg 24.5* 24.8* 25.1*   MCHC g/dL 32.0 31.7 32.7   RDW % 17.1* 17.0* 17.2*   MPV fL 9.3 9.5 9.5   NRBC AUTO /100 WBCs 0  --  0     CMP:  Results from last 7 days   Lab Units 01/31/24  0354 01/30/24  1533   POTASSIUM mmol/L 3.6 3.6   CHLORIDE mmol/L 104 105   CO2 mmol/L 25 25   BUN mg/dL 10 11   CREATININE mg/dL 0.75 0.74   CALCIUM mg/dL 9.5 9.6   AST U/L  --  20   ALT U/L  --  26   ALK PHOS U/L  --  80   EGFR ml/min/1.73sq m 110 110     BMP:  Results from last 7 days   Lab Units 01/31/24  0354 01/30/24  1533   POTASSIUM mmol/L 3.6 3.6   CHLORIDE mmol/L 104 105   CO2 mmol/L 25 25   BUN mg/dL 10 11   CREATININE mg/dL 0.75 0.74   CALCIUM mg/dL 9.5 9.6     Lab Results   Component Value Date    CREATININE 0.75 01/31/2024    CREATININE 0.74 01/30/2024    CREATININE 0.92 05/22/2023          Results from last 7 days   Lab Units 01/31/24  0126   INR  0.99       Lipid Profile:   Lab Results   Component Value Date    CHOL 231 02/14/2015    CHOL 252 12/06/2013     Lab Results   Component Value Date    HDL 28 (L) 02/01/2024    HDL 28 (L) 05/22/2023    HDL 25 (L) 10/05/2022     Lab Results   Component Value Date    LDLCALC 88 02/01/2024    LDLCALC 167 (H) 05/22/2023    LDLCALC 140 (H) 10/05/2022     Lab Results   Component Value Date    TRIG 342 (H) 02/01/2024    TRIG 303 (H) 05/22/2023    TRIG 329 (H) 10/05/2022     Meds/Allergies   all current active meds have been reviewed and current meds:   Current Facility-Administered Medications   Medication Dose Route Frequency    acetaminophen (TYLENOL) tablet 650 mg  650 mg Oral Q6H PRN    aspirin chewable tablet 81 mg  81 mg Oral Daily    atorvastatin (LIPITOR) tablet 80 mg  80 mg Oral Daily With Dinner     carvedilol (COREG) tablet 12.5 mg  12.5 mg Oral BID With Meals    escitalopram (LEXAPRO) tablet 5 mg  5 mg Oral Daily    heparin (porcine) 25,000 units in 0.45% NaCl 250 mL infusion (premix)  3-20 Units/kg/hr (Order-Specific) Intravenous Titrated    heparin (porcine) injection 2,000 Units  2,000 Units Intravenous Q6H PRN    heparin (porcine) injection 4,000 Units  4,000 Units Intravenous Q6H PRN    hydrALAZINE (APRESOLINE) injection 10 mg  10 mg Intravenous Q6H PRN    hydroCHLOROthiazide tablet 25 mg  25 mg Oral Daily    losartan (COZAAR) tablet 100 mg  100 mg Oral Daily    nicotine (NICODERM CQ) 14 mg/24hr TD 24 hr patch 1 patch  1 patch Transdermal Daily    ondansetron (ZOFRAN) injection 4 mg  4 mg Intravenous Q6H PRN    ticagrelor (BRILINTA) tablet 90 mg  90 mg Oral Q12H EMMETT     Medications Prior to Admission   Medication    carvedilol (COREG) 12.5 mg tablet    escitalopram (LEXAPRO) 5 mg tablet    hydrochlorothiazide (HYDRODIURIL) 25 mg tablet    losartan (COZAAR) 100 MG tablet    rosuvastatin (CRESTOR) 10 MG tablet     heparin (porcine), 3-20 Units/kg/hr (Order-Specific), Last Rate: 17.1 Units/kg/hr (02/01/24 0530)    Counseling / Coordination of Care  Total floor / unit time spent today 20 minutes.  Greater than 50% of total time was spent with the patient and / or family counseling and / or coordination of care.      ** Please Note: Dragon 360 Dictation voice to text software may have been used in the creation of this document. **

## 2024-02-01 NOTE — ASSESSMENT & PLAN NOTE
Blood pressure noted to be significantly elevated on admission at 224/123.  Currently improved but not optimized with systolics in the 150 range  Continue Coreg 12.5 mg p.o. twice daily, HCTZ 25 mg daily and Cozaar 100 mg daily  Monitor blood pressure closely and follow-up with PCP  Work on lifestyle modification and stress reduction

## 2024-02-01 NOTE — PROGRESS NOTES
[facility]  Progress Note  Name: Gerry Cali I  MRN: [id number]  Unit/Bed#: S -01 I Date of Admission: 1/30/2024   Date of Service: 2/1/2024 I Hospital Day: 1    Assessment/Plan   * Chest pain with elevated troponin-NSTEMI vs type 2 MI due to hypertensive urgency  Assessment & Plan  POA, patient had chest pain intermittently leading up to ED arrival described as heartburn radiating to his arms and neck/jaw.   Initial troponin undetectable at 22, trended up to 2,786 at peak, and now most recently 2,361.  Cardiology consulted  Remains chest pain free since arrival, no EKG changes   He is on heparin drip due to elevated troponin with plans to proceed to cardiac catheterization today  Maintain telemetry   Follow up results of 2d echo  Continue aspirin/brilinta, statin, beta-blocker        Hypertensive urgency  Assessment & Plan  Blood pressure noted to be significantly elevated on admission at 224/123.  Currently improved but not optimized with systolics in the 150 range  Continue Coreg 12.5 mg p.o. twice daily, HCTZ 25 mg daily and Cozaar 100 mg daily  Monitor blood pressure closely and follow-up with PCP  Work on lifestyle modification and stress reduction    Hyperlipidemia  Assessment & Plan  Significantly elevated triglyceride level at 342--statin in place.  Likely will also need Tricor.    Class 3 severe obesity due to excess calories with serious comorbidity and body mass index (BMI) of 40.0 to 44.9 in adult (HCC)  Assessment & Plan  BMI 39, recommend weight loss  A1c pending to assess for DM2    Nicotine dependence  Assessment & Plan  Cessation  NRT         VTE Pharmacologic Prophylaxis: VTE Score: 2 heparin gtt    Mobility:   Basic Mobility Inpatient Raw Score: 24  JH-HLM Goal: 8: Walk 250 feet or more  JH-HLM Achieved: 7: Walk 25 feet or more  HLM Goal NOT achieved. Continue with multidisciplinary rounding and encourage appropriate mobility to improve upon HLM goals.    Patient  Centered Rounds: spoke with RN  Discussions with Specialists or Other Care Team Provider: case mgmt    Education and Discussions with Family / Patient: Updated  (wife) at bedside. And daughter    Total Time Spent on Date of Encounter in care of patient: 25 mins. This time was spent on one or more of the following: performing physical exam; counseling and coordination of care; obtaining or reviewing history; documenting in the medical record; reviewing/ordering tests, medications or procedures; communicating with other healthcare professionals and discussing with patient's family/caregivers.    Current Length of Stay: 1 day(s)  Current Patient Status: Inpatient   Certification Statement: The patient will continue to require additional inpatient hospital stay due to cardiac cath  Discharge Plan: Anticipate discharge tomorrow to home. Pending cath results    Code Status: Level 1 - Full Code    Subjective:   Patient feels well at this time.  He denies any chest pain, pressure, shortness of breath.  Notes that he had a small amount of blood when he blew his nose but not in intractable nosebleed    Objective:     Vitals:   Temp (24hrs), Av °F (36.7 °C), Min:97.9 °F (36.6 °C), Max:98.2 °F (36.8 °C)    Temp:  [97.9 °F (36.6 °C)-98.2 °F (36.8 °C)] 97.9 °F (36.6 °C)  HR:  [70-82] 74  Resp:  [16-19] 18  BP: (144-190)/() 154/95  SpO2:  [96 %-98 %] 98 %  Body mass index is 39.16 kg/m².     Input and Output Summary (last 24 hours):     Intake/Output Summary (Last 24 hours) at 2024 1101  Last data filed at 2024 2100  Gross per 24 hour   Intake 0 ml   Output --   Net 0 ml       Physical Exam:   Physical Exam  Vitals reviewed.   Constitutional:       General: He is not in acute distress.     Appearance: Normal appearance. He is obese. He is not ill-appearing, toxic-appearing or diaphoretic.   Eyes:      General: No scleral icterus.        Right eye: No discharge.         Left eye: No discharge.       Conjunctiva/sclera: Conjunctivae normal.   Cardiovascular:      Rate and Rhythm: Normal rate and regular rhythm.      Heart sounds: No murmur heard.  Pulmonary:      Effort: No respiratory distress.      Breath sounds: Normal breath sounds. No stridor. No wheezing, rhonchi or rales.   Abdominal:      General: There is no distension.      Palpations: Abdomen is soft.      Tenderness: There is no guarding.   Musculoskeletal:      Right lower leg: No edema.      Left lower leg: No edema.   Skin:     General: Skin is warm and dry.      Coloration: Skin is not jaundiced or pale.      Findings: No bruising, erythema, lesion or rash.   Neurological:      General: No focal deficit present.      Mental Status: He is alert. Mental status is at baseline.      Comments: Awake alert interactive patient seen sitting up in bedside chair in no distress          Additional Data:     Labs:  Results from last 7 days   Lab Units 01/31/24  0354   WBC Thousand/uL 13.38*   HEMOGLOBIN g/dL 15.2   HEMATOCRIT % 47.5   PLATELETS Thousands/uL 350   NEUTROS PCT % 43   LYMPHS PCT % 42   MONOS PCT % 9   EOS PCT % 5     Results from last 7 days   Lab Units 01/31/24  0354 01/30/24  1533   SODIUM mmol/L 137 137   POTASSIUM mmol/L 3.6 3.6   CHLORIDE mmol/L 104 105   CO2 mmol/L 25 25   BUN mg/dL 10 11   CREATININE mg/dL 0.75 0.74   ANION GAP mmol/L 8 7   CALCIUM mg/dL 9.5 9.6   ALBUMIN g/dL  --  4.4   TOTAL BILIRUBIN mg/dL  --  0.48   ALK PHOS U/L  --  80   ALT U/L  --  26   AST U/L  --  20   GLUCOSE RANDOM mg/dL 99 136     Results from last 7 days   Lab Units 01/31/24  0126   INR  0.99                   Lines/Drains:  Invasive Devices       Peripheral Intravenous Line  Duration             Peripheral IV 01/30/24 Right Forearm 1 day                      Telemetry:  Telemetry Orders (From admission, onward)               24 Hour Telemetry Monitoring  Continuous x 24 Hours (Telem)        Question:  Reason for 24 Hour Telemetry  Answer:  PCI/EP study  (including pacer and ICD implementation), Cardiac surgery, MI, abnormal cardiac cath, and chest pain- rule out MI                     Telemetry Reviewed: Normal Sinus Rhythm  Indication for Continued Telemetry Use: Awaiting PCI/EP Study/CABG             Imaging:     Recent Cultures (last 7 days):         Last 24 Hours Medication List:   Current Facility-Administered Medications   Medication Dose Route Frequency Provider Last Rate    acetaminophen  650 mg Oral Q6H PRN Satish Bagley MD      aspirin  81 mg Oral Daily JOYCE Sarabia      atorvastatin  80 mg Oral Daily With Dinner JOYCE Sarabia      carvedilol  12.5 mg Oral BID With Meals JOYCE Sarabia      escitalopram  5 mg Oral Daily Satish Bagley MD      heparin (porcine)  3-20 Units/kg/hr (Order-Specific) Intravenous Titrated Satish Bagley MD 17.1 Units/kg/hr (02/01/24 0530)    heparin (porcine)  2,000 Units Intravenous Q6H PRN Satish Bagley MD      heparin (porcine)  4,000 Units Intravenous Q6H PRN Satish Bagley MD      hydrALAZINE  10 mg Intravenous Q6H PRN Kedar Padron PA-C      hydroCHLOROthiazide  25 mg Oral Daily Satish Bagley MD      losartan  100 mg Oral Daily Satish Bagley MD      nicotine  1 patch Transdermal Daily Satish Bagley MD      ondansetron  4 mg Intravenous Q6H PRN Satish Bagley MD      ticagrelor  90 mg Oral Q12H [facility] JOYCE Sarabia          Today, Patient Was Seen By: Petty Duong PA-C    **Please Note: This note may have been constructed using a voice recognition system.**

## 2024-02-01 NOTE — PLAN OF CARE
Problem: CARDIOVASCULAR - ADULT  Goal: Maintains optimal cardiac output and hemodynamic stability  Description: INTERVENTIONS:  - Monitor I/O, vital signs and rhythm  - Monitor for S/S and trends of decreased cardiac output  - Administer and titrate ordered vasoactive medications to optimize hemodynamic stability  - Assess quality of pulses, skin color and temperature  - Assess for signs of decreased coronary artery perfusion  - Instruct patient to report change in severity of symptoms  Outcome: Progressing  Goal: Absence of cardiac dysrhythmias or at baseline rhythm  Description: INTERVENTIONS:  - Continuous cardiac monitoring, vital signs, obtain 12 lead EKG if ordered  - Administer antiarrhythmic and heart rate control medications as ordered  - Monitor electrolytes and administer replacement therapy as ordered  Outcome: Progressing     Problem: PAIN - ADULT  Goal: Verbalizes/displays adequate comfort level or baseline comfort level  Description: Interventions:  - Encourage patient to monitor pain and request assistance  - Assess pain using appropriate pain scale  - Administer analgesics based on type and severity of pain and evaluate response  - Implement non-pharmacological measures as appropriate and evaluate response  - Consider cultural and social influences on pain and pain management  - Notify physician/advanced practitioner if interventions unsuccessful or patient reports new pain  Outcome: Progressing     Problem: INFECTION - ADULT  Goal: Absence or prevention of progression during hospitalization  Description: INTERVENTIONS:  - Assess and monitor for signs and symptoms of infection  - Monitor lab/diagnostic results  - Monitor all insertion sites, i.e. indwelling lines, tubes, and drains  - Monitor endotracheal if appropriate and nasal secretions for changes in amount and color  - [place] appropriate cooling/warming therapies per order  - Administer medications as ordered  - Instruct and encourage  patient and family to use good hand hygiene technique  - Identify and instruct in appropriate isolation precautions for identified infection/condition  Outcome: Progressing  Goal: Absence of fever/infection during neutropenic period  Description: INTERVENTIONS:  - Monitor WBC    Outcome: Progressing     Problem: SAFETY ADULT  Goal: Patient will remain free of falls  Description: INTERVENTIONS:  - Educate patient/family on patient safety including physical limitations  - Instruct patient to call for assistance with activity   - Consult OT/PT to assist with strengthening/mobility   - Keep Call bell within reach  - Keep bed low and locked with side rails adjusted as appropriate  - Keep care items and personal belongings within reach  - Initiate and maintain comfort rounds  - Make Fall Risk Sign visible to staff  - Offer Toileting every 2 Hours, in advance of need  - Initiate/Maintain bed/chair alarm  - Obtain necessary fall risk management equipment  - Apply yellow socks and bracelet for high fall risk patients  - Consider moving patient to room near nurses station  Outcome: Progressing  Goal: Maintain or return to baseline ADL function  Description: INTERVENTIONS:  -  Assess patient's ability to carry out ADLs; assess patient's baseline for ADL function and identify physical deficits which impact ability to perform ADLs (bathing, care of mouth/teeth, toileting, grooming, dressing, etc.)  - Assess/evaluate cause of self-care deficits   - Assess range of motion  - Assess patient's mobility; develop plan if impaired  - Assess patient's need for assistive devices and provide as appropriate  - Encourage maximum independence but intervene and supervise when necessary  - Involve family in performance of ADLs  - Assess for home care needs following discharge   - Consider OT consult to assist with ADL evaluation and planning for discharge  - Provide patient education as appropriate  Outcome: Progressing  Goal:  Maintains/Returns to pre admission functional level  Description: INTERVENTIONS:  - Perform AM-PAC 6 Click Basic Mobility/ Daily Activity assessment daily.  - Set and communicate daily mobility goal to care team and patient/family/caregiver.   - Collaborate with rehabilitation services on mobility goals if consulted  - Perform Range of Motion   - Reposition patient   - Dangle patient   - Stand patient   - Ambulate patient  - Out of bed to chair    - Out of bed for meals   - Out of bed for toileting  - Record patient progress and toleration of activity level   Outcome: Progressing     Problem: DISCHARGE PLANNING  Goal: Discharge to home or other facility with appropriate resources  Description: INTERVENTIONS:  - Identify barriers to discharge w/patient and caregiver  - Arrange for needed discharge resources and transportation as appropriate  - Identify discharge learning needs (meds, wound care, etc.)  - Arrange for interpretive services to assist at discharge as needed  - Refer to Case Management Department for coordinating discharge planning if the patient needs post-hospital services based on physician/advanced practitioner order or complex needs related to functional status, cognitive ability, or social support system  Outcome: Progressing     Problem: Knowledge Deficit  Goal: Patient/family/caregiver demonstrates understanding of disease process, treatment plan, medications, and discharge instructions  Description: Complete learning assessment and assess knowledge base.  Interventions:  - Provide teaching at level of understanding  - Provide teaching via preferred learning methods  Outcome: Progressing

## 2024-02-01 NOTE — CASE MANAGEMENT
Case Management Progress Note    Patient name Gerry Cali  Location S /S -01 MRN [id number]  : 1975 Date 2024       LOS (days): 1  Geometric Mean LOS (GMLOS) (days): 1.8  Days to GMLOS:0.5        OBJECTIVE:        Current admission status: Inpatient  Preferred Pharmacy:   [facility]/pharmacy #[id number] - [place] - [address]  [address] [facility] [facility] [address]  Phone: [phone] Fax: [phone]    [facility] #[id number] - [place] - [address]  [address] [address]  Phone: [phone] Fax: [phone]    Primary Care Provider: JOYCE Marshall    Primary Insurance: RIZWAN DEWEY PENDING  Secondary Insurance:     PROGRESS NOTE:    CM consult received for price-check for Brilinta. Per chart, patient MA-pending. Cardiology NP informed of same. Met with patient at bedside and to discuss medication coverage. Patient confirms that he currently does not have insurance and states he met with [employer] yesterday to complete MA application. States that he usually uses GoodRx for medications, but has been able to afford everything up to this point. Appears Brilinta has already been changed to Plavix at this time. Patient aware that this writer to follow-up in AM about any additional medications to confirm affordability prior to discharge. Requesting meds to be sent to [facility].

## 2024-02-01 NOTE — DISCHARGE INSTR - AVS FIRST PAGE
Dear Gerry Cali,     It was our pleasure to care for you here at [facility].  It is our hope that we were always able to exceed the expected standards for your care during your stay.  You were hospitalized due to heart attack.  You were cared for on the 3rd floor by Petty Duong PA-C under the service of Elpidio Hidalgo DO with the [facility] who covers for your primary care physician (PCP), JOYCE Marshall, while you were hospitalized.  If you have any questions or concerns related to this hospitalization, you may contact us at .  For follow up as well as any medication refills, we recommend that you follow up with your primary care physician.  A registered nurse will reach out to you by phone within a few days after your discharge to answer any additional questions that you may have after going home.  However, at this time we provide for you here, the most important instructions / recommendations at discharge:     Notable Medication Adjustments -   Continue all new meds as listed  Testing Required after Discharge -   Cholesterol in 3 months  ** Please contact your PCP to request testing orders for any of the testing recommended here **  Important follow up information -   Cardiology  Family doctor  Other Instructions -   Smoking cessation  Please review this entire after visit summary as additional general instructions including medication list, appointments, activity, diet, any pertinent wound care, and other additional recommendations from your care team that may be provided for you.      Sincerely,     Petty Duong PA-C         1. Please see the post angioplasty discharge instructions.   No heavy lifting, greater than 10 lbs. or strenuous activity for 5 days.  Follow angioplasty discharge instructions.    2.Remove band aid tomorrow.  Shower and wash area- wrist gently with soap and water- beginning tomorrow. Rinse and  pat dry.  Apply new water seal band aid.  Repeat this process for 5 days. No powders, creams lotions or antibiotic ointments  for 5 days.  No tub baths, hot tubs or swimming for 5 days.     3. Call [facility] Cardiology Office ([phone]) if you develop a fever, redness or drainage at your wrist access site.      4. No driving for 2 days    5. Do not stop aspirin or plavix any reason without a cardiologist’s consent, or the stent could block up and cause a heart attack.    6. Stent card and book.

## 2024-02-02 VITALS
WEIGHT: 250 LBS | HEART RATE: 78 BPM | DIASTOLIC BLOOD PRESSURE: 90 MMHG | TEMPERATURE: 98.2 F | BODY MASS INDEX: 39.24 KG/M2 | HEIGHT: 67 IN | SYSTOLIC BLOOD PRESSURE: 156 MMHG | OXYGEN SATURATION: 96 % | RESPIRATION RATE: 18 BRPM

## 2024-02-02 PROBLEM — I21.4 NSTEMI (NON-ST ELEVATED MYOCARDIAL INFARCTION) (HCC): Status: ACTIVE | Noted: 2020-11-18

## 2024-02-02 LAB
ANION GAP SERPL CALCULATED.3IONS-SCNC: 6 MMOL/L
BUN SERPL-MCNC: 15 MG/DL (ref 5–25)
CALCIUM SERPL-MCNC: 9.3 MG/DL (ref 8.4–10.2)
CHLORIDE SERPL-SCNC: 106 MMOL/L (ref 96–108)
CO2 SERPL-SCNC: 26 MMOL/L (ref 21–32)
CREAT SERPL-MCNC: 0.69 MG/DL (ref 0.6–1.3)
ERYTHROCYTE [DISTWIDTH] IN BLOOD BY AUTOMATED COUNT: 17.1 % (ref 11.6–15.1)
GFR SERPL CREATININE-BSD FRML MDRD: 113 ML/MIN/1.73SQ M
GLUCOSE SERPL-MCNC: 89 MG/DL (ref 65–140)
HCT VFR BLD AUTO: 47.3 % (ref 36.5–49.3)
HGB BLD-MCNC: 15.2 G/DL (ref 12–17)
MCH RBC QN AUTO: 24.6 PG (ref 26.8–34.3)
MCHC RBC AUTO-ENTMCNC: 32.1 G/DL (ref 31.4–37.4)
MCV RBC AUTO: 77 FL (ref 82–98)
PLATELET # BLD AUTO: 335 THOUSANDS/UL (ref 149–390)
PMV BLD AUTO: 9.6 FL (ref 8.9–12.7)
POTASSIUM SERPL-SCNC: 3.8 MMOL/L (ref 3.5–5.3)
RBC # BLD AUTO: 6.18 MILLION/UL (ref 3.88–5.62)
SODIUM SERPL-SCNC: 138 MMOL/L (ref 135–147)
WBC # BLD AUTO: 13.01 THOUSAND/UL (ref 4.31–10.16)

## 2024-02-02 PROCEDURE — 85027 COMPLETE CBC AUTOMATED: CPT | Performed by: INTERNAL MEDICINE

## 2024-02-02 PROCEDURE — 80048 BASIC METABOLIC PNL TOTAL CA: CPT | Performed by: INTERNAL MEDICINE

## 2024-02-02 PROCEDURE — 99239 HOSP IP/OBS DSCHRG MGMT >30: CPT | Performed by: PHYSICIAN ASSISTANT

## 2024-02-02 PROCEDURE — 99232 SBSQ HOSP IP/OBS MODERATE 35: CPT | Performed by: INTERNAL MEDICINE

## 2024-02-02 RX ORDER — HYDROCHLOROTHIAZIDE 25 MG/1
25 TABLET ORAL DAILY
Qty: 30 TABLET | Refills: 0 | Status: SHIPPED | OUTPATIENT
Start: 2024-02-02 | End: 2024-02-08 | Stop reason: SDUPTHER

## 2024-02-02 RX ORDER — NICOTINE 21 MG/24HR
1 PATCH, TRANSDERMAL 24 HOURS TRANSDERMAL DAILY
Qty: 28 PATCH | Refills: 0 | Status: SHIPPED | OUTPATIENT
Start: 2024-02-03

## 2024-02-02 RX ORDER — ASPIRIN 81 MG/1
81 TABLET, CHEWABLE ORAL DAILY
Qty: 30 TABLET | Refills: 0 | Status: SHIPPED | OUTPATIENT
Start: 2024-02-03 | End: 2024-02-08 | Stop reason: SDUPTHER

## 2024-02-02 RX ORDER — ACETAMINOPHEN 325 MG/1
650 TABLET ORAL EVERY 6 HOURS PRN
Start: 2024-02-02

## 2024-02-02 RX ORDER — CLOPIDOGREL BISULFATE 75 MG/1
75 TABLET ORAL DAILY
Qty: 30 TABLET | Refills: 0 | Status: SHIPPED | OUTPATIENT
Start: 2024-02-03 | End: 2024-02-08 | Stop reason: SDUPTHER

## 2024-02-02 RX ORDER — CARVEDILOL 12.5 MG/1
12.5 TABLET ORAL 2 TIMES DAILY WITH MEALS
Qty: 60 TABLET | Refills: 0 | Status: SHIPPED | OUTPATIENT
Start: 2024-02-02 | End: 2024-02-08 | Stop reason: SDUPTHER

## 2024-02-02 RX ORDER — NITROGLYCERIN 0.4 MG/1
0.4 TABLET SUBLINGUAL
Qty: 30 TABLET | Refills: 0 | Status: SHIPPED | OUTPATIENT
Start: 2024-02-02

## 2024-02-02 RX ORDER — ATORVASTATIN CALCIUM 80 MG/1
80 TABLET, FILM COATED ORAL
Qty: 30 TABLET | Refills: 0 | Status: SHIPPED | OUTPATIENT
Start: 2024-02-02 | End: 2024-02-08 | Stop reason: SDUPTHER

## 2024-02-02 RX ORDER — LOSARTAN POTASSIUM 100 MG/1
100 TABLET ORAL DAILY
Qty: 30 TABLET | Refills: 0 | Status: SHIPPED | OUTPATIENT
Start: 2024-02-02 | End: 2024-02-08 | Stop reason: SDUPTHER

## 2024-02-02 RX ADMIN — CARVEDILOL 12.5 MG: 12.5 TABLET, FILM COATED ORAL at 09:34

## 2024-02-02 RX ADMIN — CLOPIDOGREL BISULFATE 75 MG: 75 TABLET ORAL at 09:34

## 2024-02-02 RX ADMIN — LOSARTAN POTASSIUM 100 MG: 50 TABLET, FILM COATED ORAL at 09:34

## 2024-02-02 RX ADMIN — ESCITALOPRAM OXALATE 5 MG: 10 TABLET ORAL at 09:34

## 2024-02-02 RX ADMIN — ASPIRIN 81 MG 81 MG: 81 TABLET ORAL at 09:33

## 2024-02-02 RX ADMIN — HYDROCHLOROTHIAZIDE 25 MG: 25 TABLET ORAL at 09:34

## 2024-02-02 NOTE — ASSESSMENT & PLAN NOTE
POA, patient had chest pain intermittently leading up to ED arrival described as heartburn radiating to his arms and neck/jaw.   Initial troponin undetectable at 22, trended up to 2,786 at peak, and most recently 2,361.  Cardiology consulted  Remained chest pain free since arrival, no EKG changes   Cardiac cath showed: 1st Mrg lesion is 50% stenosed. Ramus lesion is 40% stenosed.1st Diag lesion is 95% stenosed. HÉCTOR placed  2d echo G1DD and EF 55%  Continue aspirin/plavix, statin, beta-blocker

## 2024-02-02 NOTE — ASSESSMENT & PLAN NOTE
Patient understands need for complete tobacco cessation and reports he has been in the process of cutting down already  NRT   No

## 2024-02-02 NOTE — PROGRESS NOTES
General Cardiology   Progress Note -  Team One   Gerry Cali 48 y.o. male MRN: [id number]  Unit/Bed#: S -01 Encounter: [id number]    Assessment     NSTEMI s/p PCI/HÉCTOR to D1  Presented with chest pain radiating into his jaw associated with exertion and relieved with rest  Hs troponin up to 2700  ECG-NSR with nonspecific IVCD  TTE 2/1: LVEF 55%, normal wall motion, grade 1 DD, mild TR  LHC 2/1: culprit 95% D1 s/p PCI/HÉCTOR placement. Residual nonobstructive CAD 40% ramus, 50% OM 1  DAPT with ASA and Plavix   Started on atorvastatin 80 mg daily this admission     Hypertensive emergency in setting of medication noncompliance  BP improving, systolics in the 140s to 150s over past 24 hours.  On carvedilol 12.5 mg twice daily, hydrochlorothiazide 25 mg daily, and losartan 100 mg daily     HLD-, triglycerides 342, HDL 28, LDL 88.  not taking statin PTA. Started on atorvastatin 80 mg daily     Obesity-BMI 39.16     Tobacco abuse-previously smoked 2 to 3 packs of cigarettes per day.  Now smoking black and milds.     Suspected NICKI-outpatient sleep study     Plan  DAPT for minimum of 1 year with ASA and Plavix (Brilinta not affordable)  Continue current BP med regimen, can increase carvedilol or HCTZ in the office next week if BP remains above goal.  Continue high intensity statin therapy  Smoking cessation discussed  Cardiac rehab referral placed  Post cath instructions and medications discussed with patient. All questions answered.  Stable for d/c planning from cardiac standpoint. Has follow up 2/8.    Subjective  Feeling well, hoping to go home today.  Review of Systems   Constitutional: Negative for chills, malaise/fatigue and weight gain.   Cardiovascular:  Negative for chest pain, dyspnea on exertion, leg swelling, orthopnea, palpitations and syncope.   Respiratory:  Negative for cough, shortness of breath, sleep disturbances due to breathing and sputum production.    Endocrine: Negative.   "  Hematologic/Lymphatic: Negative.    Gastrointestinal:  Negative for bloating and nausea.   Genitourinary: Negative.    Neurological:  Negative for dizziness, light-headedness and weakness.   Psychiatric/Behavioral:  Negative for altered mental status.    All other systems reviewed and are negative.    Objective:   Vitals: Blood pressure 156/90, pulse 78, temperature 98.2 °F (36.8 °C), temperature source Oral, resp. rate 18, height 5' 7\" (1.702 m), weight 113 kg (250 lb), SpO2 96%., Body mass index is 39.16 kg/m².,     Systolic (24hrs), Av , Min:132 , Max:164     Diastolic (24hrs), Av, Min:74, Max:95    No intake or output data in the 24 hours ending 24 1045  Wt Readings from Last 3 Encounters:   24 113 kg (250 lb)   23 115 kg (254 lb)   23 114 kg (252 lb)     Telemetry Review: NSR    Physical Exam  Vitals reviewed.   Constitutional:       General: He is not in acute distress.  Neck:      Vascular: No hepatojugular reflux or JVD.   Cardiovascular:      Rate and Rhythm: Normal rate and regular rhythm.      Heart sounds: Normal heart sounds. No murmur heard.     No friction rub. No gallop.   Pulmonary:      Effort: Pulmonary effort is normal. No respiratory distress.      Breath sounds: No rales.   Abdominal:      General: Bowel sounds are normal. There is no distension.      Palpations: Abdomen is soft.      Tenderness: There is no abdominal tenderness.   Musculoskeletal:         General: No tenderness. Normal range of motion.      Cervical back: Neck supple.      Right lower leg: No edema.      Left lower leg: No edema.   Skin:     General: Skin is warm and dry.      Capillary Refill: Capillary refill takes less than 2 seconds.      Findings: No erythema.      Comments: Right radial cath site band aid C/D/I. No erythema or hematoma. 2+ radial pulse.   Neurological:      Mental Status: He is alert and oriented to person, place, and time.   Psychiatric:         Mood and Affect: Mood " "normal.         LABORATORY RESULTS      CBC with diff:   Results from last 7 days   Lab Units 02/02/24  0519 01/31/24  0354 01/31/24  0126 01/30/24  1533   WBC Thousand/uL 13.01* 13.38* 12.22* 12.75*   HEMOGLOBIN g/dL 15.2 15.2 15.4 15.8   HEMATOCRIT % 47.3 47.5 48.6 48.3   MCV fL 77* 77* 78* 77*   PLATELETS Thousands/uL 335 350 340 345   RBC Million/uL 6.18* 6.20* 6.20* 6.29*   MCH pg 24.6* 24.5* 24.8* 25.1*   MCHC g/dL 32.1 32.0 31.7 32.7   RDW % 17.1* 17.1* 17.0* 17.2*   MPV fL 9.6 9.3 9.5 9.5   NRBC AUTO /100 WBCs  --  0  --  0     CMP:  Results from last 7 days   Lab Units 02/02/24  0519 02/01/24  1025 01/31/24  0354 01/30/24  1533   POTASSIUM mmol/L 3.8 4.0 3.6 3.6   CHLORIDE mmol/L 106 103 104 105   CO2 mmol/L 26 27 25 25   BUN mg/dL 15 15 10 11   CREATININE mg/dL 0.69 0.76 0.75 0.74   CALCIUM mg/dL 9.3 10.0 9.5 9.6   AST U/L  --   --   --  20   ALT U/L  --   --   --  26   ALK PHOS U/L  --   --   --  80   EGFR ml/min/1.73sq m 113 109 110 110       BMP:  Results from last 7 days   Lab Units 02/02/24  0519 02/01/24  1025 01/31/24  0354 01/30/24  1533   POTASSIUM mmol/L 3.8 4.0 3.6 3.6   CHLORIDE mmol/L 106 103 104 105   CO2 mmol/L 26 27 25 25   BUN mg/dL 15 15 10 11   CREATININE mg/dL 0.69 0.76 0.75 0.74   CALCIUM mg/dL 9.3 10.0 9.5 9.6       Lab Results   Component Value Date    CREATININE 0.69 02/02/2024    CREATININE 0.76 02/01/2024    CREATININE 0.75 01/31/2024       No results found for: \"NTBNP\"               Results from last 7 days   Lab Units 01/31/24  0354   HEMOGLOBIN A1C % 5.7*              Results from last 7 days   Lab Units 01/31/24  0126   INR  0.99       Lipid Profile:   Lab Results   Component Value Date    CHOL 231 02/14/2015    CHOL 252 12/06/2013     Lab Results   Component Value Date    HDL 28 (L) 02/01/2024    HDL 28 (L) 05/22/2023    HDL 25 (L) 10/05/2022     Lab Results   Component Value Date    LDLCALC 88 02/01/2024    LDLCALC 167 (H) 05/22/2023    LDLCALC 140 (H) 10/05/2022     Lab " Results   Component Value Date    TRIG 342 (H) 02/01/2024    TRIG 303 (H) 05/22/2023    TRIG 329 (H) 10/05/2022     Meds/Allergies   all current active meds have been reviewed and current meds:   Current Facility-Administered Medications   Medication Dose Route Frequency    acetaminophen (TYLENOL) tablet 650 mg  650 mg Oral Q6H PRN    aspirin chewable tablet 81 mg  81 mg Oral Daily    atorvastatin (LIPITOR) tablet 80 mg  80 mg Oral Daily With Dinner    carvedilol (COREG) tablet 12.5 mg  12.5 mg Oral BID With Meals    clopidogrel (PLAVIX) tablet 75 mg  75 mg Oral Daily    escitalopram (LEXAPRO) tablet 5 mg  5 mg Oral Daily    hydrALAZINE (APRESOLINE) injection 10 mg  10 mg Intravenous Q6H PRN    hydroCHLOROthiazide tablet 25 mg  25 mg Oral Daily    losartan (COZAAR) tablet 100 mg  100 mg Oral Daily    nicotine (NICODERM CQ) 14 mg/24hr TD 24 hr patch 1 patch  1 patch Transdermal Daily    nitroglycerin (NITROSTAT) SL tablet 0.4 mg  0.4 mg Sublingual Q5 Min PRN    ondansetron (ZOFRAN) injection 4 mg  4 mg Intravenous Q6H PRN    sodium chloride 0.9 % infusion  150 mL/hr Intravenous Continuous     Medications Prior to Admission   Medication    carvedilol (COREG) 12.5 mg tablet    escitalopram (LEXAPRO) 5 mg tablet    rosuvastatin (CRESTOR) 10 MG tablet    [DISCONTINUED] hydrochlorothiazide (HYDRODIURIL) 25 mg tablet    [DISCONTINUED] losartan (COZAAR) 100 MG tablet     sodium chloride, 150 mL/hr, Last Rate: 150 mL/hr (02/01/24 1244)      Counseling / Coordination of Care  Total floor / unit time spent today 20 minutes.  Greater than 50% of total time was spent with the patient and / or family counseling and / or coordination of care.      ** Please Note: Dragon 360 Dictation voice to text software may have been used in the creation of this document. **

## 2024-02-02 NOTE — ASSESSMENT & PLAN NOTE
Blood pressure noted to be significantly elevated on admission at 224/123.  Patient admits he had not been compliant with taking his medications  Continue Coreg 12.5 mg p.o. twice daily, HCTZ 25 mg daily and Cozaar 100 mg daily  Monitor blood pressure closely and follow-up with PCP  Work on lifestyle modification and stress reduction

## 2024-02-02 NOTE — ASSESSMENT & PLAN NOTE
Significantly elevated triglyceride level at 342--statin in place.  Likely will also need Tricor in the future

## 2024-02-02 NOTE — DISCHARGE SUMMARY
Formerly [facility]  Discharge- Gerry Cali 1975, 48 y.o. male MRN: [id number]  Unit/Bed#: S -01 Encounter: [id number]  Primary Care Provider: JOYCE Marshall   Date and time admitted to hospital: 1/30/2024  4:00 PM    * NSTEMI (non-ST elevated myocardial infarction) ([facility])  Assessment & Plan  POA, patient had chest pain intermittently leading up to ED arrival described as heartburn radiating to his arms and neck/jaw.   Initial troponin undetectable at 22, trended up to 2,786 at peak, and most recently 2,361.  Cardiology consulted  Remained chest pain free since arrival, no EKG changes   Cardiac cath showed: 1st Mrg lesion is 50% stenosed. Ramus lesion is 40% stenosed.1st Diag lesion is 95% stenosed. HÉCTOR placed  2d echo G1DD and EF 55%  Continue aspirin/plavix, statin, beta-blocker        Hypertensive urgency  Assessment & Plan  Blood pressure noted to be significantly elevated on admission at 224/123.  Patient admits he had not been compliant with taking his medications  Continue Coreg 12.5 mg p.o. twice daily, HCTZ 25 mg daily and Cozaar 100 mg daily  Monitor blood pressure closely and follow-up with PCP  Work on lifestyle modification and stress reduction    Hyperlipidemia  Assessment & Plan  Significantly elevated triglyceride level at 342--statin in place.  Likely will also need Tricor in the future    Class 3 severe obesity due to excess calories with serious comorbidity and body mass index (BMI) of 40.0 to 44.9 in adult ([facility])  Assessment & Plan  BMI 39, recommend weight loss  A1c 5.7    Nicotine dependence  Assessment & Plan  Patient understands need for complete tobacco cessation and reports he has been in the process of cutting down already  NRT      Medical Problems       Resolved Problems  Date Reviewed: 2/2/2024   None       Discharging Physician / Practitioner: Petty Duong PA-C  PCP: JOYCE Marshall  Admission Date:   Admission Orders (From  "admission, onward)       Ordered        01/31/24 1043  Inpatient Admission  Once            01/30/24 1720  Place in Observation  Once                          Discharge Date: 02/02/24    Consultations During Hospital Stay:  cardiology    Procedures Performed:   Cardiac cath as above  2d echo as above    Significant Findings / Test Results:   NSTEMI    Incidental Findings:   none    Test Results Pending at Discharge (will require follow up):   none     Outpatient Tests Requested:  FLP and A1c in 12 weeks    Complications:  none    Reason for Admission: chest pain    Hospital Course:   Gerry Cali is a 48 y.o. male patient who originally presented to the hospital on 1/30/2024 due to chest pain.  Patient is known to have multiple risk factors including hypertension and had not been compliant with taking his antihypertensives prior to arrival and was also continuing to smoke.  He had notable troponin elevation and was placed on heparin drip and seen by cardiology.  On February 1 he underwent cardiac catheterization and required placement of a drug-eluting stent to 1st diagonal 95% stenosis.  Patient remained chest pain-free and was counseled on the importance of staying on medication, weight loss, and tobacco cessation.    Please see above list of diagnoses and related plan for additional information.     Condition at Discharge: stable    Discharge Day Visit / Exam:   Subjective: Patient feels well at this time with no chest pain or shortness of breath.  Patient indicates understanding of what he needs to do moving forward to prevent future cardiac events  Vitals: Blood Pressure: 156/90 (02/02/24 0713)  Pulse: 78 (02/02/24 0713)  Temperature: 98.2 °F (36.8 °C) (02/02/24 0713)  Temp Source: Oral (02/02/24 0713)  Respirations: 18 (02/02/24 0713)  Height: 5' 7\" (170.2 cm) (02/01/24 0800)  Weight - Scale: 113 kg (250 lb) (02/01/24 0800)  SpO2: 96 % (02/02/24 0713)  Exam:   Physical Exam  Vitals reviewed.   Constitutional: "       General: He is not in acute distress.     Appearance: Normal appearance. He is obese. He is not ill-appearing, toxic-appearing or diaphoretic.   HENT:      Nose: No congestion or rhinorrhea.   Eyes:      General: No scleral icterus.        Right eye: No discharge.         Left eye: No discharge.      Conjunctiva/sclera: Conjunctivae normal.   Cardiovascular:      Rate and Rhythm: Normal rate and regular rhythm.      Heart sounds: No murmur heard.  Pulmonary:      Effort: No respiratory distress.      Breath sounds: Normal breath sounds. No stridor. No wheezing, rhonchi or rales.   Abdominal:      General: There is no distension.      Palpations: Abdomen is soft.      Tenderness: There is no abdominal tenderness. There is no guarding.   Musculoskeletal:         General: No swelling or deformity.      Right lower leg: No edema.      Left lower leg: No edema.   Skin:     General: Skin is warm and dry.      Coloration: Skin is not jaundiced or pale.      Findings: No bruising, erythema, lesion or rash.   Neurological:      General: No focal deficit present.      Mental Status: He is alert. Mental status is at baseline.   Psychiatric:         Mood and Affect: Mood normal.          Discussion with Family: Updated  (wife and daughter) at bedside.    Discharge instructions/Information to patient and family:   See after visit summary for information provided to patient and family.      Provisions for Follow-Up Care:  See after visit summary for information related to follow-up care and any pertinent home health orders.      Mobility at time of Discharge:   Basic Mobility Inpatient Raw Score: 24  JH-HLM Goal: 8: Walk 250 feet or more  JH-HLM Achieved: 8: Walk 250 feet ot more  HLM Goal achieved. Continue to encourage appropriate mobility.     Disposition:   Home    Planned Readmission: none     Discharge Statement:  I spent 35 minutes discharging the patient. This time was spent on the day of discharge. I  had direct contact with the patient on the day of discharge. Greater than 50% of the total time was spent examining patient, answering all patient questions, arranging and discussing plan of care with patient as well as directly providing post-discharge instructions.  Additional time then spent on discharge activities.  Case was discussed with nursing, cardiology, case management.  Work note was provided.    Discharge Medications:  See after visit summary for reconciled discharge medications provided to patient and/or family.      **Please Note: This note may have been constructed using a voice recognition system**

## 2024-02-02 NOTE — CASE MANAGEMENT
Case Management Progress Note    Patient name Gerry Cali  Location S /S -01 MRN [id number]  : 1975 Date 2024       LOS (days): 2  Geometric Mean LOS (GMLOS) (days): 3.8  Days to GMLOS:1.8        OBJECTIVE:        Current admission status: Inpatient  Preferred Pharmacy:   [id number] - [place] - [address]  [address] [address]  Phone: [phone] Fax: [phone]    [id number] - [place] - [address] [address]  Phone: [phone] Fax: [phone]    Primary Care Provider: JOYCE Marshall    Primary Insurance: [place] [employer] [employer]  Secondary Insurance:     PROGRESS NOTE:    Patient medically clear for d/c today. Provided him with GoodRx coupons for new medications. Family at bedside and to transport patient once d/c paperwork reviewed by RN.

## 2024-02-03 ENCOUNTER — NURSE TRIAGE (OUTPATIENT)
Dept: OTHER | Facility: OTHER | Age: 49
End: 2024-02-03

## 2024-02-03 NOTE — TELEPHONE ENCOUNTER
"Regarding: Post Op/ High Blood Presssure  ----- Message from Ayush Vallejo sent at 2/3/2024  3:14 PM EST -----  \"My father had a heart attack and did a procedure on thursday to add a stent in his artery now his blood pressure is 171/81.\"    "

## 2024-02-03 NOTE — TELEPHONE ENCOUNTER
"Answer Assessment - Initial Assessment Questions  1. BLOOD PRESSURE: \"What is the blood pressure?\" \"Did you take at least two measurements 5 minutes apart?\"      171/81 at 2:45pm            206/106 at 0930            185/104    2. ONSET: \"When did you take your blood pressure?\"      Today    3. HOW: \"How did you obtain the blood pressure?\" (e.g., visiting nurse, automatic home BP monitor)      Automatic monitor.     4. HISTORY: \"Do you have a history of high blood pressure?\"      hypertension  5. MEDICATIONS: \"Are you taking any medications for blood pressure?\" \"Have you missed any doses recently?\"        6. OTHER SYMPTOMS: \"Do you have any symptoms?\" (e.g., headache, chest pain, blurred vision, difficulty breathing, weakness)      Minimal discomfort in chest.    Protocols used: Blood Pressure - High-ADULT-AH      Pt took his low dose aspirin, carvedilol 12.5 mg BID, Plavix 75 mg daily, HCTZ 25 mg daily, atorvastatin 80 mg daily-has taken all meds except for 2nd dose of carvedilol. Pt also has 0.4mg of Nitro-which he has not taken.     Per on call Dr. Jaimes- pt can increase his carvedilol to 2 tablets BID and can take his extra dose now.     Pt daughter and pt aware- both verbalized understanding.     "

## 2024-02-06 NOTE — PROGRESS NOTES
"Gerry Cali  1975  [id number]  [facility] [address] [facility] [address] [phone]    1. Coronary artery disease of native artery of native heart with stable angina pectoris (HCC)  Basic metabolic panel    POCT ECG      2. Essential hypertension  hydroCHLOROthiazide 25 mg tablet    losartan (COZAAR) 100 MG tablet    Basic metabolic panel    POCT ECG      3. Mixed hyperlipidemia  Lipid Panel with Direct LDL reflex      4. Chest discomfort  clopidogrel (PLAVIX) 75 mg tablet    carvedilol (COREG) 12.5 mg tablet    atorvastatin (LIPITOR) 80 mg tablet    aspirin 81 mg chewable tablet    POCT ECG      5. NICKI (obstructive sleep apnea)  Ambulatory referral to Sleep Medicine          Summary/Discussion:  Coronary artery disease:  - s/p hospital admission from 1/30/2024 - 2/2/2024 for an NSTEMI s/p HÉCTOR to D1 on 2/1/2024  - right radial cath site C/D/I without significant ecchymosis or hematoma noted. Neurovascular exam WDL  - continue on aspirin, Plavix, statin, and BB. PRN SL nitro prescribed  - adherence to DTAP therapy reinforced in which patient has been taking without any missed doses.  - lifestyle modifications were discussed  - referral for cardiac rehab. Appointment not scheduled yet.     Chest discomfort:  - patient states feeling as if his chest \"feels sore\" on the left side of his chest and first noticed it on Saturday (2/3). States happening 4-5 times a day usually at rest. He has not noticed any type of chest discomfort with exertion and has not had any associated symptoms with it.   - on exam today, pain was able to be reproduced with touch and he did not experience any chest pain/soreness with a deep breath.   - question if this symptom is related to his uncontrolled hypertension or possibly musculoskeletal based on today's exam.  - EKG: NSR w/ RBBB (no change from prior EKG).   - advised patient to call the office if symptoms continue or do not " improve with recent antihypertensive medication change. Patient understands that if pain gets worse or if he starts developing symptoms similar to his recent NSTEMI to report to the ER.     Palpitations:   - he mentioned noticing the sensation of his heart racing/palpating last night while laying in bed. This was his first time he noticed this and states it lasted about 1-2 minutes and resolved. He has not experienced it since. There were no contributing factors or associated symptoms with it.    - discussed ordering a Holter monitor to r/o arrhythmias however, patient does not currently have health insurance and would like to defer ordering a Holter monitor at this time.    - TSH normal in 2022  - EKG as noted above  - no irregular heart rhythm noted on exam today   - advised him to make a log and if he notices that they become more frequent we can consider ordering a Holter monitor at that time.     Hypertension:  - not well controlled with /90  - has been checking his blood pressures daily at home with recorded systolic readings of 120-170's  - home blood pressure cuff was brought to office today and was checked for accuracy. His home blood pressure cuff was reading 10 points higher then our cuff in the office.   - I increased his carvedilol from 12.5 mg BID to 25 mg BID. If he continues to have hypertension can considered adding amlodipine or discontinuing HCTZ and starting chlorthalidone.   - continue HCTZ 25 mg daily and losartan 100 mg daily   - repeat BMP tomorrow  - he will continue checking his blood pressure once daily at home and notify the office in 2 weeks with updated readings.      Dyslipidemia:  - Lipid Profile:    Latest Reference Range & Units 02/01/24 04:45   Cholesterol See Comment mg/dL 184   Triglycerides See Comment mg/dL 342 (H)   HDL >=40 mg/dL 28 (L)   Non-HDL Cholesterol mg/dl 156   LDL Calculated 0 - 100 mg/dL 88   - Lipids not at goal. He was recently started on high intensity  "statin therapy  - encouraged low cholesterol diet and lifestyle modifications  - repeat lipid panel in 4 weeks     Suspected NICKI:  - recommend a sleep study- referral for sleep medicine ordered     Tobacco use:  - quite smoking cigarettes in 2022. Now smoking black and milds in which he cut back from 2/3 packs a day to 4/5 black and milds daily.  - complete smoking cessation encouraged      Interval History: Gerry Cali is a 48 y.o. year old male with history of NSTEMI s/p HÉCTOR to 1st diagonal branch on 2/1/2024, HTN, and dyslipidemia, tobacco use, and obesity who presents to the office today for hospital follow up.     He presented to [facility] ED on 1/30/2024 for chest pain that was radiating into his jaw associated with exertion and relieved with rest. On presentation, he was found to be in hypertensive urgency with BP of 224/123. EKG demonstrated normal sinus rhythm with nonspecific IVCD, troponin peaked at 2,786, echo (2/1) LVEF 55%, normal wall motion, grade 1 DD, and mild TR.     LHC was performed on 2/1 and showed 1st mrg lesion 50% stenosed, ramus lesion 40% stenosed, and 1st diag lesion 95% stenosed which was the culprit lesion s/p HÉCTOR x 1.     BP improved on carvedilol 12.5 mg daily, hydrochlorothiazide 25 mg daily, and losartan 100 mg daily.      Lipid profile (2/1/24): total cholesterol 184, triglycerides 342, HDL 28, . He was started on high intensity statin therapy during hospital stay.     He was discharged home on 2/2/2024.     Since hospital admission he has been feeling ok. He states feeling as if his chest \"feels sore\" on the left side of his chest and first noticed it on Saturday (2/3). States happening 4-5 times a day usually at rest. He has not noticed any type of chest discomfort with exertion and has not had any associated symptoms with it. He also states 1 episode of feeling like his heart was racing/palpating while laying in bed but has not experienced this since.     He " has been logging his blood pressures and noticed that they are elevated with systolic pressures ranging from 120-170's. He has been compliant with his medications and has not missed any doses. He has noticed being more fatigued then normal but also has not been very active since he left the hospital. He has made some life style changes and has been watching his diet, limiting cholesterol and salt intake, as well as cutting back on smoking. He denies shortness of breath, lower extremity edema, orthopnea, and PND.  He denies lightheadedness, dizziness, and syncope.     He will RTO in 3 months with Dr. Arellano/ myself or sooner if necessary. He will call with any concerns.       Medical Problems       Problem List       Nicotine dependence    Class 3 severe obesity due to excess calories with serious comorbidity and body mass index (BMI) of 40.0 to 44.9 in adult ([facility])    Hypertension    Overview Deleted 4/9/2019  8:11 PM by Iain Chi PA-C            Hyperlipidemia    Loud snoring    Lump    Obesity, morbid, BMI 40.0-49.9 ([facility])    Essential hypertension    NSTEMI (non-ST elevated myocardial infarction) ([facility])    Obesity (BMI 30-39.9)    Tachycardia    Abnormal EKG    Encounter for long-term current use of medication    Polycythemia    Anxiety    Lump of skin of right upper extremity    Hypertensive urgency    Coronary artery disease involving native coronary artery        Past Medical History:   Diagnosis Date    Coronary artery disease involving native coronary artery 02/01/2024    Hyperlipidemia     Hypertension     Nicotine dependence      Social History     Socioeconomic History    Marital status: Single     Spouse name: Not on file    Number of children: 1    Years of education: Not on file    Highest education level: Not on file   Occupational History    Not on file   Tobacco Use    Smoking status: Some Days     Types: Cigars    Smokeless tobacco: Never   Substance and Sexual Activity    Alcohol use: Yes      Comment: social alcohol use    Drug use: No    Sexual activity: Yes     Partners: Female     Birth control/protection: Female Sterilization   Other Topics Concern    Not on file   Social History Narrative    Caffeine use    Marital history - Currently  - As per [employer]    Parentage    Sedentary lifestyle    Working full time      Social Determinants of Health     Financial Resource Strain: Not on file   Food Insecurity: Not on file   Transportation Needs: Not on file   Physical Activity: Not on file   Stress: Not on file   Social Connections: Not on file   Intimate Partner Violence: Not on file   Housing Stability: Not on file      Family History   Problem Relation Age of Onset    Arthritis Mother     Hyperlipidemia Mother     Hypertension Mother     Kidney failure Mother     Other Father     Diabetes Father     Hypertension Father     Hypertension Sister      Past Surgical History:   Procedure Laterality Date    CARDIAC CATHETERIZATION Left 2/1/2024    Procedure: Cardiac Left Heart Cath;  Surgeon: Gerry Hickey MD;  [facility];  Service: Cardiology    CARDIAC CATHETERIZATION N/A 2/1/2024    Procedure: Cardiac pci;  Surgeon: Gerry Hickey MD;  [facility];  Service: Cardiology    CARDIAC CATHETERIZATION Left 2/1/2024    Procedure: Cardiac Left Ventriculogram;  Surgeon: Gerry Hickey MD;  [facility];  Service: Cardiology       Current Outpatient Medications:     acetaminophen (TYLENOL) 325 mg tablet, Take 2 tablets (650 mg total) by mouth every 6 (six) hours as needed for mild pain or fever, Disp: , Rfl:     aspirin 81 mg chewable tablet, Chew 1 tablet (81 mg total) daily, Disp: 30 tablet, Rfl: 3    atorvastatin (LIPITOR) 80 mg tablet, Take 1 tablet (80 mg total) by mouth daily with dinner, Disp: 30 tablet, Rfl: 3    carvedilol (COREG) 12.5 mg tablet, Take 2 tablets (25 mg total) by mouth 2 (two) times a day with meals, Disp: 60 tablet, Rfl: 2     "clopidogrel (PLAVIX) 75 mg tablet, Take 1 tablet (75 mg total) by mouth daily, Disp: 30 tablet, Rfl: 10    escitalopram (LEXAPRO) 5 mg tablet, TAKE 1 TABLET (5 MG TOTAL) BY MOUTH DAILY., Disp: 90 tablet, Rfl: 0    hydroCHLOROthiazide 25 mg tablet, Take 1 tablet (25 mg total) by mouth daily, Disp: 30 tablet, Rfl: 3    losartan (COZAAR) 100 MG tablet, Take 1 tablet (100 mg total) by mouth daily, Disp: 30 tablet, Rfl: 3    nitroglycerin (NITROSTAT) 0.4 mg SL tablet, Place 1 tablet (0.4 mg total) under the tongue every 5 (five) minutes as needed for chest pain, Disp: 30 tablet, Rfl: 0    nicotine (NICODERM CQ) 14 mg/24hr TD 24 hr patch, Place 1 patch on the skin over 24 hours daily (Patient not taking: Reported on 2/8/2024), Disp: 28 patch, Rfl: 0  No Active Allergies    Labs:     Chemistry        Component Value Date/Time     02/14/2015 1016    K 3.8 02/02/2024 0519    K 3.9 02/14/2015 1016     02/02/2024 0519     02/14/2015 1016    CO2 26 02/02/2024 0519    CO2 32 (H) 02/14/2015 1016    BUN 15 02/02/2024 0519    BUN 11 02/14/2015 1016    CREATININE 0.69 02/02/2024 0519    CREATININE 0.69 02/14/2015 1016        Component Value Date/Time    CALCIUM 9.3 02/02/2024 0519    CALCIUM 8.9 02/14/2015 1016    ALKPHOS 80 01/30/2024 1533    ALKPHOS 84 02/14/2015 1016    AST 20 01/30/2024 1533    AST 32 02/14/2015 1016    ALT 26 01/30/2024 1533    ALT 61 (H) 02/14/2015 1016    BILITOT 0.5 02/14/2015 1016            Lab Results   Component Value Date    CHOL 231 02/14/2015    CHOL 252 12/06/2013     Lab Results   Component Value Date    HDL 28 (L) 02/01/2024    HDL 28 (L) 05/22/2023    HDL 25 (L) 10/05/2022     Lab Results   Component Value Date    LDLCALC 88 02/01/2024    LDLCALC 167 (H) 05/22/2023    LDLCALC 140 (H) 10/05/2022     Lab Results   Component Value Date    TRIG 342 (H) 02/01/2024    TRIG 303 (H) 05/22/2023    TRIG 329 (H) 10/05/2022     No results found for: \"CHOLHDL\"    Imaging: Cardiac " "catheterization    Result Date: 2/1/2024  Narrative:   1st Mrg lesion is 50% stenosed.   Ramus lesion is 40% stenosed.   1st Diag lesion is 95% stenosed.   The left ventricular systolic function is normal.   The angioplasty was pre-stent angioplasty. 2v CAD     Echo complete w/ contrast if indicated    Result Date: 2/1/2024  Narrative:   Left Ventricle: Left ventricular cavity size is normal. Wall thickness is mildly increased. The left ventricular ejection fraction is 55%. Systolic function is normal. Wall motion is normal. Diastolic function is mildly abnormal, consistent with grade I (abnormal) relaxation.   Tricuspid Valve: There is mild regurgitation.     XR chest 1 view portable    Result Date: 1/31/2024  Narrative: CHEST INDICATION:   chest pain. COMPARISON: 11/17/2013 EXAM PERFORMED/VIEWS:  XR CHEST PORTABLE FINDINGS: Cardiomediastinal silhouette appears unremarkable. The lungs are clear.  No pneumothorax or pleural effusion. Osseous structures appear within normal limits for patient age.     Impression: No acute cardiopulmonary disease. Workstation performed: [id number]       ECG:  NSR w/ RBBB    Review of Systems   Constitutional: Positive for malaise/fatigue. Negative for chills, decreased appetite, fever and weight gain.   HENT: Negative.     Eyes:  Negative for blurred vision.   Cardiovascular:  Positive for chest pain (\"chest soreness\") and palpitations. Negative for dyspnea on exertion, irregular heartbeat, leg swelling, near-syncope, orthopnea, paroxysmal nocturnal dyspnea and syncope.   Respiratory:  Positive for snoring. Negative for cough, shortness of breath and sleep disturbances due to breathing.    Endocrine: Negative.    Hematologic/Lymphatic: Negative for bleeding problem.   Skin: Negative.    Musculoskeletal: Negative.    Gastrointestinal: Negative.    Genitourinary: Negative.    Neurological:  Negative for dizziness, headaches, light-headedness and weakness.   Psychiatric/Behavioral: " "Negative.         Vitals:    02/08/24 0949   BP: 160/90   Pulse: 90   SpO2: 99%     Vitals:    02/08/24 0949   Weight: 111 kg (244 lb)     Height: 5' 7\" (170.2 cm)   Body mass index is 38.22 kg/m².    Physical Exam  Vitals reviewed.   Constitutional:       General: He is not in acute distress.     Appearance: Normal appearance. He is not ill-appearing.   HENT:      Head: Normocephalic.      Nose: Nose normal.      Mouth/Throat:      Mouth: Mucous membranes are moist.      Pharynx: Oropharynx is clear.   Neck:      Vascular: No carotid bruit or JVD.   Cardiovascular:      Rate and Rhythm: Normal rate and regular rhythm.      Pulses: Normal pulses.      Heart sounds: Normal heart sounds. No murmur heard.  Pulmonary:      Effort: Pulmonary effort is normal.      Breath sounds: Normal breath sounds. No wheezing.   Musculoskeletal:      Cervical back: Normal range of motion.      Right lower leg: No edema.      Left lower leg: No edema.   Skin:     General: Skin is warm and dry.   Neurological:      Mental Status: He is alert and oriented to person, place, and time.   Psychiatric:         Mood and Affect: Mood normal.         Behavior: Behavior normal.        "

## 2024-02-08 ENCOUNTER — OFFICE VISIT (OUTPATIENT)
Dept: CARDIOLOGY CLINIC | Facility: CLINIC | Age: 49
End: 2024-02-08

## 2024-02-08 VITALS
OXYGEN SATURATION: 99 % | DIASTOLIC BLOOD PRESSURE: 90 MMHG | SYSTOLIC BLOOD PRESSURE: 160 MMHG | WEIGHT: 244 LBS | HEIGHT: 67 IN | HEART RATE: 90 BPM | BODY MASS INDEX: 38.3 KG/M2

## 2024-02-08 DIAGNOSIS — I25.118 CORONARY ARTERY DISEASE OF NATIVE ARTERY OF NATIVE HEART WITH STABLE ANGINA PECTORIS (HCC): Primary | ICD-10-CM

## 2024-02-08 DIAGNOSIS — R07.89 CHEST DISCOMFORT: ICD-10-CM

## 2024-02-08 DIAGNOSIS — I10 ESSENTIAL HYPERTENSION: ICD-10-CM

## 2024-02-08 DIAGNOSIS — E78.2 MIXED HYPERLIPIDEMIA: ICD-10-CM

## 2024-02-08 DIAGNOSIS — G47.33 OSA (OBSTRUCTIVE SLEEP APNEA): ICD-10-CM

## 2024-02-08 PROCEDURE — 99214 OFFICE O/P EST MOD 30 MIN: CPT

## 2024-02-08 PROCEDURE — 93000 ELECTROCARDIOGRAM COMPLETE: CPT

## 2024-02-08 RX ORDER — CARVEDILOL 12.5 MG/1
25 TABLET ORAL 2 TIMES DAILY WITH MEALS
Qty: 60 TABLET | Refills: 2 | Status: SHIPPED | OUTPATIENT
Start: 2024-02-08

## 2024-02-08 RX ORDER — CLOPIDOGREL BISULFATE 75 MG/1
75 TABLET ORAL DAILY
Qty: 30 TABLET | Refills: 10 | Status: SHIPPED | OUTPATIENT
Start: 2024-02-08

## 2024-02-08 RX ORDER — LOSARTAN POTASSIUM 100 MG/1
100 TABLET ORAL DAILY
Qty: 30 TABLET | Refills: 3 | Status: SHIPPED | OUTPATIENT
Start: 2024-02-08 | End: 2024-06-07

## 2024-02-08 RX ORDER — ATORVASTATIN CALCIUM 80 MG/1
80 TABLET, FILM COATED ORAL
Qty: 30 TABLET | Refills: 3 | Status: SHIPPED | OUTPATIENT
Start: 2024-02-08

## 2024-02-08 RX ORDER — HYDROCHLOROTHIAZIDE 25 MG/1
25 TABLET ORAL DAILY
Qty: 30 TABLET | Refills: 3 | Status: SHIPPED | OUTPATIENT
Start: 2024-02-08

## 2024-02-08 RX ORDER — ASPIRIN 81 MG/1
81 TABLET, CHEWABLE ORAL DAILY
Qty: 30 TABLET | Refills: 3 | Status: SHIPPED | OUTPATIENT
Start: 2024-02-08

## 2024-02-08 NOTE — PATIENT INSTRUCTIONS
I increased your carvedilol to 25 mg twice daily. Please continue to check your blood pressure once daily. The best time to check your blood pressure is at least one hour after you take your blood pressure medication. Keep a log of your blood pressures. Call the office in 2 weeks ([phone]) with updated blood pressure readings.     Please get blood work tomorrow. This is not a fasting test so you can eat or drink prior. I also placed blood work for a lipid panel due in 4 weeks. This is a fasting test so no eating or drinking 12hrs prior.     As we talked about today, continue to monitor your symptoms of palpations and log how many times they are happening. If they become more frequent, you can call the office or message me on Chatalog and we can discuss the heart monitor again.      I placed a referral for sleep medicine to address the concerns for sleep apnea. When you have insurance this is something that can be done.     Thank you,  JOYCE Mccarthy

## 2024-02-09 ENCOUNTER — APPOINTMENT (OUTPATIENT)
Dept: LAB | Facility: CLINIC | Age: 49
End: 2024-02-09

## 2024-02-09 DIAGNOSIS — I25.118 CORONARY ARTERY DISEASE OF NATIVE ARTERY OF NATIVE HEART WITH STABLE ANGINA PECTORIS (HCC): ICD-10-CM

## 2024-02-09 DIAGNOSIS — I10 ESSENTIAL HYPERTENSION: ICD-10-CM

## 2024-02-09 DIAGNOSIS — E78.2 MIXED HYPERLIPIDEMIA: ICD-10-CM

## 2024-02-09 LAB
ANION GAP SERPL CALCULATED.3IONS-SCNC: 5 MMOL/L
BUN SERPL-MCNC: 15 MG/DL (ref 5–25)
CALCIUM SERPL-MCNC: 10.2 MG/DL (ref 8.4–10.2)
CHLORIDE SERPL-SCNC: 102 MMOL/L (ref 96–108)
CO2 SERPL-SCNC: 32 MMOL/L (ref 21–32)
CREAT SERPL-MCNC: 0.84 MG/DL (ref 0.6–1.3)
GFR SERPL CREATININE-BSD FRML MDRD: 105 ML/MIN/1.73SQ M
GLUCOSE SERPL-MCNC: 103 MG/DL (ref 65–140)
POTASSIUM SERPL-SCNC: 3.8 MMOL/L (ref 3.5–5.3)
SODIUM SERPL-SCNC: 139 MMOL/L (ref 135–147)

## 2024-02-09 PROCEDURE — 80048 BASIC METABOLIC PNL TOTAL CA: CPT

## 2024-02-09 PROCEDURE — 36415 COLL VENOUS BLD VENIPUNCTURE: CPT

## 2024-02-12 DIAGNOSIS — G47.33 OSA (OBSTRUCTIVE SLEEP APNEA): Primary | ICD-10-CM

## 2024-02-27 DIAGNOSIS — R07.89 CHEST DISCOMFORT: ICD-10-CM

## 2024-02-27 RX ORDER — CARVEDILOL 12.5 MG/1
TABLET ORAL
Qty: 360 TABLET | Refills: 1 | Status: SHIPPED | OUTPATIENT
Start: 2024-02-27

## 2024-02-28 ENCOUNTER — OFFICE VISIT (OUTPATIENT)
Dept: FAMILY MEDICINE CLINIC | Facility: CLINIC | Age: 49
End: 2024-02-28

## 2024-02-28 VITALS
OXYGEN SATURATION: 97 % | HEIGHT: 67 IN | HEART RATE: 78 BPM | DIASTOLIC BLOOD PRESSURE: 80 MMHG | WEIGHT: 245 LBS | BODY MASS INDEX: 38.45 KG/M2 | SYSTOLIC BLOOD PRESSURE: 118 MMHG | RESPIRATION RATE: 16 BRPM

## 2024-02-28 DIAGNOSIS — E66.9 OBESITY (BMI 30-39.9): ICD-10-CM

## 2024-02-28 DIAGNOSIS — F41.9 ANXIETY: ICD-10-CM

## 2024-02-28 DIAGNOSIS — I21.4 NSTEMI (NON-ST ELEVATED MYOCARDIAL INFARCTION) (HCC): ICD-10-CM

## 2024-02-28 DIAGNOSIS — I25.10 CORONARY ARTERY DISEASE INVOLVING NATIVE CORONARY ARTERY OF NATIVE HEART WITHOUT ANGINA PECTORIS: ICD-10-CM

## 2024-02-28 DIAGNOSIS — E78.2 MIXED HYPERLIPIDEMIA: ICD-10-CM

## 2024-02-28 DIAGNOSIS — I10 ESSENTIAL HYPERTENSION: Primary | ICD-10-CM

## 2024-02-28 DIAGNOSIS — D75.1 POLYCYTHEMIA: ICD-10-CM

## 2024-02-28 PROCEDURE — 99212 OFFICE O/P EST SF 10 MIN: CPT | Performed by: NURSE PRACTITIONER

## 2024-02-28 RX ORDER — ESCITALOPRAM OXALATE 5 MG/1
5 TABLET ORAL DAILY
Qty: 30 TABLET | Refills: 3 | Status: SHIPPED | OUTPATIENT
Start: 2024-02-28

## 2024-02-28 NOTE — PROGRESS NOTES
Name: Gerry Cali      : 1975      MRN: [id number]  Encounter Provider: JOYCE Marshall  Encounter Date: 2024   Encounter department: [facility]    Assessment & Plan     1. Essential hypertension  /80.   Continue to follow-up with cardiology.     2. Anxiety  -     escitalopram (LEXAPRO) 5 mg tablet; Take 1 tablet (5 mg total) by mouth daily    Patient takes lexapro 5mg daily for anxiety and it helps.   Denies any depression or suicidal thoughts.   Continue lexapro 5mg daily.     3. Coronary artery disease involving native coronary artery of native heart without angina pectoris  Continue to follow-up with cardiology.     4. NSTEMI (non-ST elevated myocardial infarction) (HCC)  Patient was admitted from 24-24 for NSTEMI.   Patient had a drug eluting stent placed.   Patient is taking aspirin and plavix as prescribed by cardiology.   Continue to follow-up with cardiology.       5. Mixed hyperlipidemia  Continue to follow-up with cardiology.     6. Obesity (BMI 30-39.9)  Patient instructed to eat a healthy low fat diet.     7. Polycythemia  Patient instructed to follow-up with hematology.     Patient instructed to follow-up in 4 months or sooner prn.            Subjective      Patient is here for a follow-up for chronic medical conditions.   Patient went to the hospital with chest burning on 24. Patient reports that he was not taking his blood pressure medications at the time.   EKG and lab work was done. Troponins were elevated. Patient was admitted.   Patient was diagnosed with NSTEMI. Patient went to the cath lab on 24 and had a drug eluting stent placed.   Patient was started on aspirin and plavix.   Patient was discharged on 24.   Patient followed up with cardiology on 24. Patient also followed up with cardiology for HTN and hyperlipidemia.   Denies anymore chest pain.   Patient reports that he needs to schedule his next follow-up  appointment with cardiology.   Patient is here for a follow-up for obesity. Patient reports that he is watching his diet.   Patient takes lexapro 5mg daily for anxiety and it helps.   Denies any depression or suicidal thoughts.   Patient has not seen hematology for polycythemia recently.       Review of Systems   Constitutional:  Negative for chills and fever.   HENT:  Negative for congestion, ear pain, sinus pressure and sore throat.    Respiratory:  Negative for cough, chest tightness, shortness of breath and wheezing.    Cardiovascular:  Negative for chest pain and palpitations.   Gastrointestinal:  Negative for abdominal pain, blood in stool, diarrhea, nausea and vomiting.   Genitourinary:  Negative for dysuria and hematuria.   Skin:  Negative for rash.   Neurological:  Negative for dizziness, syncope, light-headedness and headaches.   Psychiatric/Behavioral:  Negative for suicidal ideas.         As noted in HPI.        Current Outpatient Medications on File Prior to Visit   Medication Sig    acetaminophen (TYLENOL) 325 mg tablet Take 2 tablets (650 mg total) by mouth every 6 (six) hours as needed for mild pain or fever    aspirin 81 mg chewable tablet Chew 1 tablet (81 mg total) daily    atorvastatin (LIPITOR) 80 mg tablet Take 1 tablet (80 mg total) by mouth daily with dinner    carvedilol (COREG) 12.5 mg tablet TAKE 2 TABLETS BY MOUTH 2 TIMES A DAY WITH MEALS    clopidogrel (PLAVIX) 75 mg tablet Take 1 tablet (75 mg total) by mouth daily    hydroCHLOROthiazide 25 mg tablet Take 1 tablet (25 mg total) by mouth daily    losartan (COZAAR) 100 MG tablet Take 1 tablet (100 mg total) by mouth daily    nitroglycerin (NITROSTAT) 0.4 mg SL tablet Place 1 tablet (0.4 mg total) under the tongue every 5 (five) minutes as needed for chest pain    [DISCONTINUED] escitalopram (LEXAPRO) 5 mg tablet TAKE 1 TABLET (5 MG TOTAL) BY MOUTH DAILY.    nicotine (NICODERM CQ) 14 mg/24hr TD 24 hr patch Place 1 patch on the skin over 24  "hours daily (Patient not taking: Reported on 2/8/2024)       Objective     /80   Pulse 78   Resp 16   Ht 5' 7\" (1.702 m)   Wt 111 kg (245 lb)   SpO2 97%   BMI 38.37 kg/m²     Physical Exam  Vitals reviewed.   Constitutional:       General: He is not in acute distress.     Appearance: He is obese. He is not ill-appearing or diaphoretic.   HENT:      Right Ear: External ear normal.      Left Ear: External ear normal.      Nose: Nose normal.      Mouth/Throat:      Mouth: Mucous membranes are moist.      Pharynx: Oropharynx is clear. No oropharyngeal exudate or posterior oropharyngeal erythema.   Eyes:      Conjunctiva/sclera: Conjunctivae normal.      Pupils: Pupils are equal, round, and reactive to light.   Cardiovascular:      Rate and Rhythm: Normal rate and regular rhythm.      Pulses: Normal pulses.      Heart sounds: Normal heart sounds.   Pulmonary:      Effort: Pulmonary effort is normal. No respiratory distress.      Breath sounds: Normal breath sounds. No wheezing.   Skin:     Findings: No rash.   Neurological:      Mental Status: He is alert and oriented to person, place, and time.   Psychiatric:         Mood and Affect: Mood normal.       JOYCE Marshall    "

## 2024-03-11 ENCOUNTER — APPOINTMENT (OUTPATIENT)
Dept: LAB | Facility: CLINIC | Age: 49
End: 2024-03-11
Payer: COMMERCIAL

## 2024-03-11 DIAGNOSIS — E78.2 MIXED HYPERLIPIDEMIA: ICD-10-CM

## 2024-03-11 LAB
CHOLEST SERPL-MCNC: 110 MG/DL
HDLC SERPL-MCNC: 23 MG/DL
LDLC SERPL CALC-MCNC: 46 MG/DL (ref 0–100)
TRIGL SERPL-MCNC: 203 MG/DL

## 2024-03-11 PROCEDURE — 80061 LIPID PANEL: CPT

## 2024-03-13 DIAGNOSIS — R07.89 CHEST DISCOMFORT: ICD-10-CM

## 2024-03-13 DIAGNOSIS — I10 ESSENTIAL HYPERTENSION: ICD-10-CM

## 2024-03-13 RX ORDER — CLOPIDOGREL BISULFATE 75 MG/1
75 TABLET ORAL DAILY
Qty: 90 TABLET | Refills: 4 | Status: SHIPPED | OUTPATIENT
Start: 2024-03-13

## 2024-03-13 RX ORDER — HYDROCHLOROTHIAZIDE 25 MG/1
25 TABLET ORAL DAILY
Qty: 90 TABLET | Refills: 2 | Status: SHIPPED | OUTPATIENT
Start: 2024-03-13

## 2024-03-13 RX ORDER — ASPIRIN 81 MG
81 TABLET,CHEWABLE ORAL DAILY
Qty: 90 TABLET | Refills: 2 | Status: SHIPPED | OUTPATIENT
Start: 2024-03-13

## 2024-03-13 RX ORDER — ATORVASTATIN CALCIUM 80 MG/1
80 TABLET, FILM COATED ORAL
Qty: 90 TABLET | Refills: 2 | Status: SHIPPED | OUTPATIENT
Start: 2024-03-13

## 2024-03-13 RX ORDER — LOSARTAN POTASSIUM 100 MG/1
100 TABLET ORAL DAILY
Qty: 90 TABLET | Refills: 2 | Status: SHIPPED | OUTPATIENT
Start: 2024-03-13

## 2024-03-27 DIAGNOSIS — F41.9 ANXIETY: ICD-10-CM

## 2024-03-27 RX ORDER — ESCITALOPRAM OXALATE 5 MG/1
5 TABLET ORAL DAILY
Qty: 90 TABLET | Refills: 1 | Status: SHIPPED | OUTPATIENT
Start: 2024-03-27

## 2024-05-07 ENCOUNTER — OFFICE VISIT (OUTPATIENT)
Dept: CARDIOLOGY CLINIC | Facility: CLINIC | Age: 49
End: 2024-05-07

## 2024-05-07 VITALS
HEART RATE: 81 BPM | TEMPERATURE: 98.2 F | OXYGEN SATURATION: 98 % | BODY MASS INDEX: 38.71 KG/M2 | DIASTOLIC BLOOD PRESSURE: 94 MMHG | SYSTOLIC BLOOD PRESSURE: 168 MMHG | HEIGHT: 67 IN | WEIGHT: 246.6 LBS

## 2024-05-07 DIAGNOSIS — Z72.0 TOBACCO USE: ICD-10-CM

## 2024-05-07 DIAGNOSIS — I10 ESSENTIAL HYPERTENSION: ICD-10-CM

## 2024-05-07 DIAGNOSIS — E78.2 MIXED HYPERLIPIDEMIA: ICD-10-CM

## 2024-05-07 DIAGNOSIS — I25.10 CORONARY ARTERY DISEASE INVOLVING NATIVE CORONARY ARTERY OF NATIVE HEART WITHOUT ANGINA PECTORIS: ICD-10-CM

## 2024-05-07 PROCEDURE — 99214 OFFICE O/P EST MOD 30 MIN: CPT

## 2024-05-07 RX ORDER — AMLODIPINE BESYLATE 5 MG/1
5 TABLET ORAL DAILY
Qty: 30 TABLET | Refills: 3 | Status: SHIPPED | OUTPATIENT
Start: 2024-05-07

## 2024-05-07 NOTE — PATIENT INSTRUCTIONS
Today I started you on amlodipine 5 mg daily with your elevated blood pressures. I would like you to come back in about a week for a blood pressure check to make sure your blood pressures are going down with added medication.       Please call the office with any questions or concerns.     Thanks,   - JOYCE Mccarthy

## 2024-05-07 NOTE — PROGRESS NOTES
Gerry Viraj  1975  [id number]  [facility] [address] [facility] [address] [phone]    1. Coronary artery disease involving native coronary artery of native heart without angina pectoris        2. Essential hypertension  amLODIPine (NORVASC) 5 mg tablet      3. Mixed hyperlipidemia        4. Tobacco use            Summary/Discussion:  Coronary artery disease:  - with prior NSTEMI s/p HÉCTOR to D1 on 2/1  - without symptoms of angina   - continue on aspirin, plavix, statin, and BB. PRN SL nitro prescribed  - adherence to DTAP therapy reinforced in which patient has been taking without any missed doses  - lifestyle modifications were discussed  - encourage cardiac rehab which patient has not yet attended     Palpitations:   - at last office visit patient was experiencing a frequent sensation of his heart racing/palpating last night while laying in bed, lasting about 1-2 minutes, with no contributing factors or associated symptoms  I discussed ordering a Holter monitor to r/o arrhythmias however, patient deferred at this time due to not having health insurance  I increased his carvedilol to 25 mg BID for BP control and symptom management of palpitations  patient encounter on 2/11 with c/o of the same with again recommendations for Holter monitor but with no further encounters noted after recommendations  - in office today, he states improvement of palpitations, episodes are very infrequent and if any at all   - no irregular heart rhythm noted on physical exam today   - continue carvedilol     Hypertension:  - BP elevated today in office, /94  - carvedilol increased from 12.5 mg BID to 25 mg BID at last office visit with improvement of BP documented on 3/28 of 118/80; however patient states continued elevated BPs noted at home when he randomly checks   - recommend starting amlodipine 5 mg daily with 1 week nurse visit for BP check  - dicussed importance of  limiting sodium intake in diet, following up with sleep medicine with concerns for NICKI, and tobacco cessation     Dyslipidemia:  - Lipid Profile:    Latest Reference Range & Units 03/11/24 06:40   Cholesterol See Comment mg/dL 110   Triglycerides See Comment mg/dL 203 (H)   HDL >=40 mg/dL 23 (L)   LDL Calculated 0 - 100 mg/dL 46   - triglycerides still elevated but with improvement from prior lipid profile.  Will continue high intensity statin therapy.  If triglycerides remain elevated can consider adding fenofibrate  - encourage low cholesterol diet and annual lipid follow up     Suspected NICKI:  - sleep medicine referral placed at last office visit but it does not appear that he has followed up with this  - discussion had as noted above     Tobacco use:  - quite smoking cigarettes in 2022. Now smoking black and milds in which he cut back from 2/3 packs a day to 4/5 black and milds daily  - complete smoking cessation encouraged    Pre-diabetic:  - hemoglobin A1c: 5.7  - lifestyle modifications discussed    Interval History: Gerry Cali is a 48 y.o. year old male with history of CAD with prior NSTEMI s/p HÉCTOR to 1st diagonal branch in 2/2024, HTN, HLD, prediabetes, tobacco use, and obesity who presents to the office today for a routine follow up.    He has been overall feeling well from a cardiac standpoint with improvement of his palpitations, chest discomfort/soreness, and fatigue. He actively level has also improved. He was making some life style changes with watching his diet, limiting cholesterol and salt intake, as well as cutting back on smoking last time I saw him in the office but states not continuing this and has gone back to his old habits. He denies any chest pain/pressure/discomfort or shortness of breath. He denies lower extremity edema, orthopnea, and PND. He denies lightheadedness, dizziness, and syncope. He denies palpitations.          He will RTO in 1 week for a nurse visit to reassess his BP and  in 6 months with Dr. Arellano or sooner if necessary. He will call with any concerns.       Medical Problems       Problem List       Nicotine dependence    Class 3 severe obesity due to excess calories with serious comorbidity and body mass index (BMI) of 40.0 to 44.9 in adult ([facility])    Hypertension    Overview Deleted 4/9/2019  8:11 PM by Iain Chi PA-C            Hyperlipidemia    Loud snoring    Lump    Obesity, morbid, BMI 40.0-49.9 ([facility])    Essential hypertension    NSTEMI (non-ST elevated myocardial infarction) ([facility])    Obesity (BMI 30-39.9)    Tachycardia    Abnormal EKG    Encounter for long-term current use of medication    Polycythemia    Anxiety    Lump of skin of right upper extremity    Hypertensive urgency    Coronary artery disease involving native coronary artery        Past Medical History:   Diagnosis Date    Coronary artery disease involving native coronary artery 02/01/2024    Hyperlipidemia     Hypertension     Nicotine dependence      Social History     Socioeconomic History    Marital status: Single     Spouse name: Not on file    Number of children: 1    Years of education: Not on file    Highest education level: Not on file   Occupational History    Not on file   Tobacco Use    Smoking status: Some Days     Types: Cigars    Smokeless tobacco: Never   Vaping Use    Vaping status: Never Used   Substance and Sexual Activity    Alcohol use: Yes     Comment: social alcohol use    Drug use: No    Sexual activity: Yes     Partners: Female     Birth control/protection: Female Sterilization   Other Topics Concern    Not on file   Social History Narrative    Caffeine use    Marital history - Currently  - As per Jamal    Parentage    Sedentary lifestyle    Working full time      Social Determinants of Health     Financial Resource Strain: Not on file   Food Insecurity: Not on file   Transportation Needs: Not on file   Physical Activity: Not on file   Stress: Not on file   Social  Connections: Not on file   Intimate Partner Violence: Not on file   Housing Stability: Not on file      Family History   Problem Relation Age of Onset    Arthritis Mother     Hyperlipidemia Mother     Hypertension Mother     Kidney failure Mother     Other Father     Diabetes Father     Hypertension Father     Hypertension Sister      Past Surgical History:   Procedure Laterality Date    CARDIAC CATHETERIZATION Left 2/1/2024    Procedure: Cardiac Left Heart Cath;  Surgeon: Gerry Hickey MD;  [facility];  Service: Cardiology    CARDIAC CATHETERIZATION N/A 2/1/2024    Procedure: Cardiac pci;  Surgeon: Gerry Hickey MD;  [facility];  Service: Cardiology    CARDIAC CATHETERIZATION Left 2/1/2024    Procedure: Cardiac Left Ventriculogram;  Surgeon: Gerry Hickey MD;  [facility];  Service: Cardiology       Current Outpatient Medications:     acetaminophen (TYLENOL) 325 mg tablet, Take 2 tablets (650 mg total) by mouth every 6 (six) hours as needed for mild pain or fever, Disp: , Rfl:     amLODIPine (NORVASC) 5 mg tablet, Take 1 tablet (5 mg total) by mouth daily, Disp: 30 tablet, Rfl: 3    Aspirin Low Dose 81 MG chewable tablet, CHEW 1 TABLET BY MOUTH DAILY, Disp: 90 tablet, Rfl: 2    atorvastatin (LIPITOR) 80 mg tablet, TAKE 1 TABLET BY MOUTH DAILY WITH DINNER, Disp: 90 tablet, Rfl: 2    carvedilol (COREG) 12.5 mg tablet, TAKE 2 TABLETS BY MOUTH 2 TIMES A DAY WITH MEALS, Disp: 360 tablet, Rfl: 1    clopidogrel (PLAVIX) 75 mg tablet, TAKE 1 TABLET BY MOUTH EVERY DAY, Disp: 90 tablet, Rfl: 4    escitalopram (LEXAPRO) 5 mg tablet, TAKE 1 TABLET (5 MG TOTAL) BY MOUTH DAILY., Disp: 90 tablet, Rfl: 1    hydroCHLOROthiazide 25 mg tablet, TAKE 1 TABLET (25 MG TOTAL) BY MOUTH DAILY., Disp: 90 tablet, Rfl: 2    losartan (COZAAR) 100 MG tablet, TAKE 1 TABLET BY MOUTH EVERY DAY, Disp: 90 tablet, Rfl: 2    nitroglycerin (NITROSTAT) 0.4 mg SL tablet, Place 1 tablet (0.4 mg  "total) under the tongue every 5 (five) minutes as needed for chest pain, Disp: 30 tablet, Rfl: 0    nicotine (NICODERM CQ) 14 mg/24hr TD 24 hr patch, Place 1 patch on the skin over 24 hours daily (Patient not taking: Reported on 2/8/2024), Disp: 28 patch, Rfl: 0  No Known Allergies    Labs:     Chemistry        Component Value Date/Time     02/14/2015 1016    K 3.8 02/09/2024 1036    K 3.9 02/14/2015 1016     02/09/2024 1036     02/14/2015 1016    CO2 32 02/09/2024 1036    CO2 32 (H) 02/14/2015 1016    BUN 15 02/09/2024 1036    BUN 11 02/14/2015 1016    CREATININE 0.84 02/09/2024 1036    CREATININE 0.69 02/14/2015 1016        Component Value Date/Time    CALCIUM 10.2 02/09/2024 1036    CALCIUM 8.9 02/14/2015 1016    ALKPHOS 80 01/30/2024 1533    ALKPHOS 84 02/14/2015 1016    AST 20 01/30/2024 1533    AST 32 02/14/2015 1016    ALT 26 01/30/2024 1533    ALT 61 (H) 02/14/2015 1016    BILITOT 0.5 02/14/2015 1016            Lab Results   Component Value Date    CHOL 231 02/14/2015    CHOL 252 12/06/2013     Lab Results   Component Value Date    HDL 23 (L) 03/11/2024    HDL 28 (L) 02/01/2024    HDL 28 (L) 05/22/2023     Lab Results   Component Value Date    LDLCALC 46 03/11/2024    LDLCALC 88 02/01/2024    LDLCALC 167 (H) 05/22/2023     Lab Results   Component Value Date    TRIG 203 (H) 03/11/2024    TRIG 342 (H) 02/01/2024    TRIG 303 (H) 05/22/2023     No results found for: \"CHOLHDL\"    Imaging: Cardiac catheterization    Result Date: 2/1/2024  Narrative:   1st Mrg lesion is 50% stenosed.   Ramus lesion is 40% stenosed.   1st Diag lesion is 95% stenosed.   The left ventricular systolic function is normal.   The angioplasty was pre-stent angioplasty. 2v CAD     Echo complete w/ contrast if indicated    Result Date: 2/1/2024  Narrative:   Left Ventricle: Left ventricular cavity size is normal. Wall thickness is mildly increased. The left ventricular ejection fraction is 55%. Systolic function is normal. " "Wall motion is normal. Diastolic function is mildly abnormal, consistent with grade I (abnormal) relaxation.   Tricuspid Valve: There is mild regurgitation.     XR chest 1 view portable    Result Date: 1/31/2024  Narrative: CHEST INDICATION:   chest pain. COMPARISON: 11/17/2013 EXAM PERFORMED/VIEWS:  XR CHEST PORTABLE FINDINGS: Cardiomediastinal silhouette appears unremarkable. The lungs are clear.  No pneumothorax or pleural effusion. Osseous structures appear within normal limits for patient age.     Impression: No acute cardiopulmonary disease. Workstation performed: [id number]       ECG:  NSR w/ RBBB    Review of Systems   Constitutional: Negative for chills, decreased appetite, fever, malaise/fatigue and weight gain.   HENT: Negative.     Eyes:  Negative for blurred vision.   Cardiovascular:  Negative for chest pain, dyspnea on exertion, irregular heartbeat, leg swelling, near-syncope, orthopnea, palpitations, paroxysmal nocturnal dyspnea and syncope.   Respiratory:  Positive for snoring. Negative for cough, shortness of breath and sleep disturbances due to breathing.    Endocrine: Negative.    Hematologic/Lymphatic: Negative for bleeding problem.   Skin: Negative.    Musculoskeletal: Negative.    Gastrointestinal: Negative.    Genitourinary: Negative.    Neurological:  Negative for dizziness, headaches, light-headedness and weakness.   Psychiatric/Behavioral: Negative.         Vitals:    05/07/24 0854   BP: 168/94   Pulse: 81   Temp: 98.2 °F (36.8 °C)   SpO2: 98%       Vitals:    05/07/24 0854   Weight: 112 kg (246 lb 9.6 oz)       Height: 5' 7\" (170.2 cm)   Body mass index is 38.62 kg/m².    Physical Exam  Vitals reviewed.   Constitutional:       General: He is not in acute distress.     Appearance: Normal appearance. He is not ill-appearing.   HENT:      Head: Normocephalic.      Nose: Nose normal.      Mouth/Throat:      Mouth: Mucous membranes are moist.      Pharynx: Oropharynx is clear.   Neck:      " Vascular: No carotid bruit or JVD.   Cardiovascular:      Rate and Rhythm: Normal rate and regular rhythm.      Pulses: Normal pulses.      Heart sounds: Normal heart sounds. No murmur heard.  Pulmonary:      Effort: Pulmonary effort is normal.      Breath sounds: Normal breath sounds. No wheezing.   Musculoskeletal:      Cervical back: Normal range of motion.      Right lower leg: No edema.      Left lower leg: No edema.   Skin:     General: Skin is warm and dry.   Neurological:      Mental Status: He is alert and oriented to person, place, and time.   Psychiatric:         Mood and Affect: Mood normal.         Behavior: Behavior normal.

## 2024-05-14 ENCOUNTER — OFFICE VISIT (OUTPATIENT)
Dept: CARDIOLOGY CLINIC | Facility: CLINIC | Age: 49
End: 2024-05-14

## 2024-05-14 VITALS
HEART RATE: 78 BPM | WEIGHT: 245 LBS | HEIGHT: 67 IN | SYSTOLIC BLOOD PRESSURE: 140 MMHG | TEMPERATURE: 97.8 F | BODY MASS INDEX: 38.45 KG/M2 | DIASTOLIC BLOOD PRESSURE: 82 MMHG | OXYGEN SATURATION: 97 %

## 2024-05-14 DIAGNOSIS — R07.89 CHEST DISCOMFORT: ICD-10-CM

## 2024-05-14 DIAGNOSIS — I10 ESSENTIAL HYPERTENSION: Primary | ICD-10-CM

## 2024-05-14 DIAGNOSIS — R07.9 CHEST PAIN, UNSPECIFIED TYPE: ICD-10-CM

## 2024-05-14 PROCEDURE — 99211 OFF/OP EST MAY X REQ PHY/QHP: CPT

## 2024-05-14 RX ORDER — NICOTINE 21 MG/24HR
1 PATCH, TRANSDERMAL 24 HOURS TRANSDERMAL DAILY
Qty: 28 PATCH | Refills: 1 | Status: SHIPPED | OUTPATIENT
Start: 2024-05-14

## 2024-05-14 RX ORDER — CARVEDILOL 25 MG/1
25 TABLET ORAL 2 TIMES DAILY WITH MEALS
Qty: 60 TABLET | Refills: 1 | Status: SHIPPED | OUTPATIENT
Start: 2024-05-14

## 2024-05-14 NOTE — PROGRESS NOTES
BP in the office today of 140/82 after starting amlodipine 5 mg daily. He reports home SBP remaining in the 140's and has noticed a mild HA since starting amlodipine. Will increase his hydrochlorothiazide to 50 mg daily and repeat BMP. I instructed him to let me know what his home BP readings are with recent change. If he continues to experience a HA while being on the amlodipine would recommend discontinuing and can consider switching his HCTZ to chlorthalidone with goal BP <130/80 mmHg.

## 2024-05-30 DIAGNOSIS — I10 ESSENTIAL HYPERTENSION: ICD-10-CM

## 2024-05-31 RX ORDER — AMLODIPINE BESYLATE 5 MG/1
5 TABLET ORAL DAILY
Qty: 90 TABLET | Refills: 1 | Status: SHIPPED | OUTPATIENT
Start: 2024-05-31

## 2024-06-08 ENCOUNTER — APPOINTMENT (OUTPATIENT)
Dept: LAB | Facility: CLINIC | Age: 49
End: 2024-06-08
Payer: COMMERCIAL

## 2024-06-08 DIAGNOSIS — I10 ESSENTIAL HYPERTENSION: ICD-10-CM

## 2024-06-08 LAB
ALBUMIN SERPL BCP-MCNC: 4.3 G/DL (ref 3.5–5)
ALP SERPL-CCNC: 60 U/L (ref 34–104)
ALT SERPL W P-5'-P-CCNC: 21 U/L (ref 7–52)
ANION GAP SERPL CALCULATED.3IONS-SCNC: 6 MMOL/L (ref 4–13)
AST SERPL W P-5'-P-CCNC: 16 U/L (ref 13–39)
BILIRUB SERPL-MCNC: 0.47 MG/DL (ref 0.2–1)
BUN SERPL-MCNC: 13 MG/DL (ref 5–25)
CALCIUM SERPL-MCNC: 9.8 MG/DL (ref 8.4–10.2)
CHLORIDE SERPL-SCNC: 105 MMOL/L (ref 96–108)
CO2 SERPL-SCNC: 31 MMOL/L (ref 21–32)
CREAT SERPL-MCNC: 0.8 MG/DL (ref 0.6–1.3)
GFR SERPL CREATININE-BSD FRML MDRD: 107 ML/MIN/1.73SQ M
GLUCOSE P FAST SERPL-MCNC: 127 MG/DL (ref 65–99)
POTASSIUM SERPL-SCNC: 4.1 MMOL/L (ref 3.5–5.3)
PROT SERPL-MCNC: 7.3 G/DL (ref 6.4–8.4)
SODIUM SERPL-SCNC: 142 MMOL/L (ref 135–147)

## 2024-06-18 DIAGNOSIS — R07.89 CHEST DISCOMFORT: ICD-10-CM

## 2024-06-18 RX ORDER — CARVEDILOL 25 MG/1
25 TABLET ORAL 2 TIMES DAILY WITH MEALS
Qty: 180 TABLET | Refills: 1 | Status: SHIPPED | OUTPATIENT
Start: 2024-06-18

## 2024-09-19 ENCOUNTER — OFFICE VISIT (OUTPATIENT)
Dept: FAMILY MEDICINE CLINIC | Facility: CLINIC | Age: 49
End: 2024-09-19

## 2024-09-19 VITALS
HEIGHT: 67 IN | BODY MASS INDEX: 38.3 KG/M2 | WEIGHT: 244 LBS | OXYGEN SATURATION: 99 % | HEART RATE: 87 BPM | SYSTOLIC BLOOD PRESSURE: 135 MMHG | DIASTOLIC BLOOD PRESSURE: 82 MMHG

## 2024-09-19 DIAGNOSIS — I10 ESSENTIAL HYPERTENSION: Primary | ICD-10-CM

## 2024-09-19 DIAGNOSIS — I21.4 NSTEMI (NON-ST ELEVATED MYOCARDIAL INFARCTION) (HCC): ICD-10-CM

## 2024-09-19 DIAGNOSIS — Z12.5 SCREENING PSA (PROSTATE SPECIFIC ANTIGEN): ICD-10-CM

## 2024-09-19 DIAGNOSIS — R73.01 IMPAIRED FASTING BLOOD SUGAR: ICD-10-CM

## 2024-09-19 DIAGNOSIS — R82.90 ABNORMAL URINE FINDINGS: ICD-10-CM

## 2024-09-19 PROCEDURE — 99213 OFFICE O/P EST LOW 20 MIN: CPT | Performed by: FAMILY MEDICINE

## 2024-09-19 NOTE — ASSESSMENT & PLAN NOTE
Had MI earlier this year, taking Plavix and aspirin and all blood pressure medication and follows cardiologist

## 2024-09-19 NOTE — ASSESSMENT & PLAN NOTE
Advised to give urine sample, he could not do and he will do tomorrow or on the weekend, exam is normal there is no CVA tenderness  Orders:    UA w Reflex to Microscopic w Reflex to Culture; Future

## 2024-09-19 NOTE — PROGRESS NOTES
Ambulatory Visit  Name: Gerry Cali      : 1975      MRN: [id number]  Encounter Provider: Maria Esther Muhammad MD  Encounter Date: 2024   Encounter department: [facility] [place]    Assessment & Plan  Essential hypertension  Advised to monitor his blood pressure at home and  When he comes back after labs done will recheck his blood pressure he says he is okay at home but he gets nervous when he is here for blood pressure check make his blood pressure go up  Orders:    CBC and differential; Future    Comprehensive metabolic panel; Future    Hemoglobin A1C; Future    Lipid panel; Future    TSH, 3rd generation; Future    NSTEMI (non-ST elevated myocardial infarction) (HCC)  Had MI earlier this year, taking Plavix and aspirin and all blood pressure medication and follows cardiologist       Abnormal urine findings  Advised to give urine sample, he could not do and he will do tomorrow or on the weekend, exam is normal there is no CVA tenderness  Orders:    UA w Reflex to Microscopic w Reflex to Culture; Future    Impaired fasting blood sugar    Orders:    Comprehensive metabolic panel; Future    Hemoglobin A1C; Future    Screening PSA (prostate specific antigen)    Orders:    PSA, Total Screen; Future         History of Present Illness     He is a new patient to me, he says he had a heart attack earlier this year, he is not compliant with all his medication, he has history of hypertension for about 4 years and he says he was not taking his medicine at that time properly, he is still smoking, and working on it he says he smokes cigars,  Denies any headache chest pain or shortness of breath he saw 2 days ago his urine was little darker or reddish in color, and now is better, he has no burning in urine no flow problem,      Review of Systems   Constitutional: Negative.  Negative for activity change, appetite change, chills, fatigue, fever and unexpected weight change.   HENT:  Negative for voice change.     Eyes:  Negative for photophobia and redness.   Respiratory:  Negative for cough, chest tightness, shortness of breath and wheezing.    Cardiovascular:  Negative for chest pain, palpitations and leg swelling.   Gastrointestinal:  Negative for abdominal pain, constipation, diarrhea, nausea and vomiting.   Endocrine: Negative for polyuria.   Genitourinary:  Negative for dysuria, frequency and urgency.        Urine color was darker and getting better now   Musculoskeletal:  Negative for arthralgias, back pain, myalgias and neck pain.   Skin:  Negative for color change, pallor and rash.   Allergic/Immunologic: Negative for environmental allergies and food allergies.   Neurological:  Negative for dizziness, weakness, light-headedness and headaches.   Hematological:  Negative for adenopathy. Does not bruise/bleed easily.   Psychiatric/Behavioral:  Negative for behavioral problems. The patient is not nervous/anxious.      Past Medical History:   Diagnosis Date    Coronary artery disease involving native coronary artery 02/01/2024    Hyperlipidemia     Hypertension     Nicotine dependence      Past Surgical History:   Procedure Laterality Date    CARDIAC CATHETERIZATION Left 2/1/2024    Procedure: Cardiac Left Heart Cath;  Surgeon: Gerry Hickey MD;  [facility];  Service: Cardiology    CARDIAC CATHETERIZATION N/A 2/1/2024    Procedure: Cardiac pci;  Surgeon: Gerry Hickey MD;  [facility];  Service: Cardiology    CARDIAC CATHETERIZATION Left 2/1/2024    Procedure: Cardiac Left Ventriculogram;  Surgeon: Gerry Hickey MD;  [facility];  Service: Cardiology     Family History   Problem Relation Age of Onset    Arthritis Mother     Hyperlipidemia Mother     Hypertension Mother     Kidney failure Mother     Other Father     Diabetes Father     Hypertension Father     Hypertension Sister      Social History     Tobacco Use    Smoking status: Some Days     Types: Cigars  "   Smokeless tobacco: Never   Vaping Use    Vaping status: Never Used   Substance and Sexual Activity    Alcohol use: Yes     Comment: social alcohol use    Drug use: No    Sexual activity: Yes     Partners: Female     Birth control/protection: Female Sterilization     Current Outpatient Medications on File Prior to Visit   Medication Sig    acetaminophen (TYLENOL) 325 mg tablet Take 2 tablets (650 mg total) by mouth every 6 (six) hours as needed for mild pain or fever    amLODIPine (NORVASC) 5 mg tablet TAKE 1 TABLET (5 MG TOTAL) BY MOUTH DAILY.    Aspirin Low Dose 81 MG chewable tablet CHEW 1 TABLET BY MOUTH DAILY    atorvastatin (LIPITOR) 80 mg tablet TAKE 1 TABLET BY MOUTH DAILY WITH DINNER    carvedilol (COREG) 25 mg tablet TAKE 1 TABLET BY MOUTH TWICE A DAY WITH FOOD    clopidogrel (PLAVIX) 75 mg tablet TAKE 1 TABLET BY MOUTH EVERY DAY    escitalopram (LEXAPRO) 5 mg tablet TAKE 1 TABLET (5 MG TOTAL) BY MOUTH DAILY.    hydroCHLOROthiazide 25 mg tablet TAKE 1 TABLET (25 MG TOTAL) BY MOUTH DAILY. (Patient taking differently: Take 50 mg by mouth daily)    losartan (COZAAR) 100 MG tablet TAKE 1 TABLET BY MOUTH EVERY DAY    nicotine (NICODERM CQ) 14 mg/24hr TD 24 hr patch Place 1 patch on the skin over 24 hours daily    nitroglycerin (NITROSTAT) 0.4 mg SL tablet Place 1 tablet (0.4 mg total) under the tongue every 5 (five) minutes as needed for chest pain     No Known Allergies  Immunization History   Administered Date(s) Administered    COVID-19 PFIZER VACCINE 0.3 ML IM 04/29/2021, 05/23/2021    Tdap 01/01/2012     Objective     /82   Pulse 87   Ht 5' 7\" (1.702 m)   Wt 111 kg (244 lb)   SpO2 99%   BMI 38.22 kg/m²     Physical Exam  Vitals and nursing note reviewed.   Constitutional:       Appearance: Normal appearance.   Cardiovascular:      Rate and Rhythm: Normal rate.      Heart sounds: No murmur heard.  Pulmonary:      Effort: Pulmonary effort is normal.   Abdominal:      General: There is no " distension.      Palpations: There is no mass.      Tenderness: There is no abdominal tenderness.   Musculoskeletal:      Cervical back: Normal range of motion.      Right lower leg: No edema.      Left lower leg: No edema.   Neurological:      General: No focal deficit present.      Mental Status: He is alert.   Psychiatric:         Mood and Affect: Mood normal.

## 2024-09-19 NOTE — ASSESSMENT & PLAN NOTE
Advised to monitor his blood pressure at home and  When he comes back after labs done will recheck his blood pressure he says he is okay at home but he gets nervous when he is here for blood pressure check make his blood pressure go up  Orders:    CBC and differential; Future    Comprehensive metabolic panel; Future    Hemoglobin A1C; Future    Lipid panel; Future    TSH, 3rd generation; Future

## 2024-09-21 ENCOUNTER — APPOINTMENT (OUTPATIENT)
Dept: LAB | Facility: HOSPITAL | Age: 49
End: 2024-09-21

## 2024-09-21 DIAGNOSIS — R82.90 ABNORMAL URINE FINDINGS: ICD-10-CM

## 2024-09-21 DIAGNOSIS — Z12.5 SCREENING PSA (PROSTATE SPECIFIC ANTIGEN): ICD-10-CM

## 2024-09-21 DIAGNOSIS — R73.01 IMPAIRED FASTING BLOOD SUGAR: ICD-10-CM

## 2024-09-21 DIAGNOSIS — I10 ESSENTIAL HYPERTENSION: ICD-10-CM

## 2024-09-21 LAB
ALBUMIN SERPL BCG-MCNC: 4.3 G/DL (ref 3.5–5)
ALP SERPL-CCNC: 54 U/L (ref 34–104)
ALT SERPL W P-5'-P-CCNC: 23 U/L (ref 7–52)
ANION GAP SERPL CALCULATED.3IONS-SCNC: 8 MMOL/L (ref 4–13)
AST SERPL W P-5'-P-CCNC: 19 U/L (ref 13–39)
BACTERIA UR QL AUTO: ABNORMAL /HPF
BASOPHILS # BLD AUTO: 0.08 THOUSANDS/ΜL (ref 0–0.1)
BASOPHILS NFR BLD AUTO: 1 % (ref 0–1)
BILIRUB SERPL-MCNC: 0.41 MG/DL (ref 0.2–1)
BILIRUB UR QL STRIP: NEGATIVE
BUN SERPL-MCNC: 12 MG/DL (ref 5–25)
CALCIUM SERPL-MCNC: 10.3 MG/DL (ref 8.4–10.2)
CHLORIDE SERPL-SCNC: 103 MMOL/L (ref 96–108)
CHOLEST SERPL-MCNC: 136 MG/DL
CLARITY UR: CLEAR
CO2 SERPL-SCNC: 32 MMOL/L (ref 21–32)
COLOR UR: YELLOW
CREAT SERPL-MCNC: 0.8 MG/DL (ref 0.6–1.3)
EOSINOPHIL # BLD AUTO: 0.57 THOUSAND/ΜL (ref 0–0.61)
EOSINOPHIL NFR BLD AUTO: 6 % (ref 0–6)
ERYTHROCYTE [DISTWIDTH] IN BLOOD BY AUTOMATED COUNT: 17.1 % (ref 11.6–15.1)
EST. AVERAGE GLUCOSE BLD GHB EST-MCNC: 117 MG/DL
GFR SERPL CREATININE-BSD FRML MDRD: 107 ML/MIN/1.73SQ M
GLUCOSE P FAST SERPL-MCNC: 97 MG/DL (ref 65–99)
GLUCOSE UR STRIP-MCNC: NEGATIVE MG/DL
HBA1C MFR BLD: 5.7 %
HCT VFR BLD AUTO: 45.1 % (ref 36.5–49.3)
HDLC SERPL-MCNC: 27 MG/DL
HGB BLD-MCNC: 14.2 G/DL (ref 12–17)
HGB UR QL STRIP.AUTO: ABNORMAL
IMM GRANULOCYTES # BLD AUTO: 0.05 THOUSAND/UL (ref 0–0.2)
IMM GRANULOCYTES NFR BLD AUTO: 1 % (ref 0–2)
KETONES UR STRIP-MCNC: NEGATIVE MG/DL
LDLC SERPL CALC-MCNC: 63 MG/DL (ref 0–100)
LEUKOCYTE ESTERASE UR QL STRIP: NEGATIVE
LYMPHOCYTES # BLD AUTO: 3.15 THOUSANDS/ΜL (ref 0.6–4.47)
LYMPHOCYTES NFR BLD AUTO: 33 % (ref 14–44)
MCH RBC QN AUTO: 24.8 PG (ref 26.8–34.3)
MCHC RBC AUTO-ENTMCNC: 31.5 G/DL (ref 31.4–37.4)
MCV RBC AUTO: 79 FL (ref 82–98)
MONOCYTES # BLD AUTO: 0.97 THOUSAND/ΜL (ref 0.17–1.22)
MONOCYTES NFR BLD AUTO: 10 % (ref 4–12)
NEUTROPHILS # BLD AUTO: 4.61 THOUSANDS/ΜL (ref 1.85–7.62)
NEUTS SEG NFR BLD AUTO: 49 % (ref 43–75)
NITRITE UR QL STRIP: NEGATIVE
NON-SQ EPI CELLS URNS QL MICRO: ABNORMAL /HPF
NONHDLC SERPL-MCNC: 109 MG/DL
NRBC BLD AUTO-RTO: 0 /100 WBCS
PH UR STRIP.AUTO: 6 [PH]
PLATELET # BLD AUTO: 397 THOUSANDS/UL (ref 149–390)
PMV BLD AUTO: 8.8 FL (ref 8.9–12.7)
POTASSIUM SERPL-SCNC: 4.1 MMOL/L (ref 3.5–5.3)
PROT SERPL-MCNC: 7.5 G/DL (ref 6.4–8.4)
PROT UR STRIP-MCNC: NEGATIVE MG/DL
PSA SERPL-MCNC: 0.17 NG/ML (ref 0–4)
RBC # BLD AUTO: 5.73 MILLION/UL (ref 3.88–5.62)
RBC #/AREA URNS AUTO: ABNORMAL /HPF
SODIUM SERPL-SCNC: 143 MMOL/L (ref 135–147)
SP GR UR STRIP.AUTO: 1.02 (ref 1–1.03)
TRIGL SERPL-MCNC: 232 MG/DL
TSH SERPL DL<=0.05 MIU/L-ACNC: 2.19 UIU/ML (ref 0.45–4.5)
UROBILINOGEN UR QL STRIP.AUTO: 0.2 E.U./DL
WBC # BLD AUTO: 9.43 THOUSAND/UL (ref 4.31–10.16)
WBC #/AREA URNS AUTO: ABNORMAL /HPF

## 2024-09-21 PROCEDURE — 84443 ASSAY THYROID STIM HORMONE: CPT

## 2024-09-21 PROCEDURE — 81003 URINALYSIS AUTO W/O SCOPE: CPT

## 2024-09-21 PROCEDURE — 80061 LIPID PANEL: CPT

## 2024-09-21 PROCEDURE — 80053 COMPREHEN METABOLIC PANEL: CPT

## 2024-09-21 PROCEDURE — 81001 URINALYSIS AUTO W/SCOPE: CPT

## 2024-09-21 PROCEDURE — 36415 COLL VENOUS BLD VENIPUNCTURE: CPT

## 2024-09-21 PROCEDURE — G0103 PSA SCREENING: HCPCS

## 2024-09-21 PROCEDURE — 83036 HEMOGLOBIN GLYCOSYLATED A1C: CPT

## 2024-09-21 PROCEDURE — 85025 COMPLETE CBC W/AUTO DIFF WBC: CPT

## 2024-10-19 PROBLEM — Z12.5 SCREENING PSA (PROSTATE SPECIFIC ANTIGEN): Status: RESOLVED | Noted: 2022-07-13 | Resolved: 2024-10-19

## 2024-10-23 DIAGNOSIS — I10 ESSENTIAL HYPERTENSION: ICD-10-CM

## 2024-10-24 RX ORDER — AMLODIPINE BESYLATE 5 MG/1
5 TABLET ORAL DAILY
Qty: 90 TABLET | Refills: 1 | Status: SHIPPED | OUTPATIENT
Start: 2024-10-24

## 2024-10-29 ENCOUNTER — OFFICE VISIT (OUTPATIENT)
Dept: FAMILY MEDICINE CLINIC | Facility: CLINIC | Age: 49
End: 2024-10-29

## 2024-10-29 VITALS
HEIGHT: 67 IN | OXYGEN SATURATION: 97 % | HEART RATE: 88 BPM | SYSTOLIC BLOOD PRESSURE: 140 MMHG | WEIGHT: 249 LBS | DIASTOLIC BLOOD PRESSURE: 70 MMHG | BODY MASS INDEX: 39.08 KG/M2

## 2024-10-29 DIAGNOSIS — R82.90 ABNORMAL URINE FINDINGS: ICD-10-CM

## 2024-10-29 DIAGNOSIS — I21.4 NSTEMI (NON-ST ELEVATED MYOCARDIAL INFARCTION) (HCC): Primary | ICD-10-CM

## 2024-10-29 DIAGNOSIS — I10 ESSENTIAL HYPERTENSION: ICD-10-CM

## 2024-10-29 DIAGNOSIS — E66.9 OBESITY (BMI 30-39.9): ICD-10-CM

## 2024-10-29 PROBLEM — E66.01 OBESITY, MORBID, BMI 40.0-49.9 (HCC): Status: RESOLVED | Noted: 2020-11-11 | Resolved: 2024-10-29

## 2024-10-29 PROCEDURE — 99213 OFFICE O/P EST LOW 20 MIN: CPT | Performed by: FAMILY MEDICINE

## 2024-10-29 NOTE — ASSESSMENT & PLAN NOTE
Home blood pressure readings are normal continue same medications  Orders:    CBC and differential; Future    Comprehensive metabolic panel; Future    Hemoglobin A1C; Future  f/u 6 months

## 2024-10-29 NOTE — ASSESSMENT & PLAN NOTE
Few RBCs in the urine because he is on aspirin and platelet dual therapy after the MI       labs and f/u 6 months

## 2024-10-29 NOTE — ASSESSMENT & PLAN NOTE
Orders:    CBC and differential; Future    Comprehensive metabolic panel; Future    Hemoglobin A1C; Future  Follows cardiologist

## 2024-11-11 PROBLEM — I25.118 CORONARY ARTERY DISEASE OF NATIVE ARTERY OF NATIVE HEART WITH STABLE ANGINA PECTORIS (HCC): Status: ACTIVE | Noted: 2024-02-01

## 2024-12-04 DIAGNOSIS — I10 ESSENTIAL HYPERTENSION: ICD-10-CM

## 2024-12-04 DIAGNOSIS — R07.89 CHEST DISCOMFORT: ICD-10-CM

## 2024-12-07 RX ORDER — CARVEDILOL 25 MG/1
25 TABLET ORAL 2 TIMES DAILY WITH MEALS
Qty: 180 TABLET | Refills: 1 | Status: SHIPPED | OUTPATIENT
Start: 2024-12-07

## 2024-12-07 RX ORDER — LOSARTAN POTASSIUM 100 MG/1
100 TABLET ORAL DAILY
Qty: 90 TABLET | Refills: 2 | Status: SHIPPED | OUTPATIENT
Start: 2024-12-07

## 2024-12-07 RX ORDER — ATORVASTATIN CALCIUM 80 MG/1
80 TABLET, FILM COATED ORAL
Qty: 90 TABLET | Refills: 2 | Status: SHIPPED | OUTPATIENT
Start: 2024-12-07

## 2024-12-07 RX ORDER — HYDROCHLOROTHIAZIDE 25 MG/1
50 TABLET ORAL DAILY
Qty: 90 TABLET | Refills: 1 | Status: SHIPPED | OUTPATIENT
Start: 2024-12-07

## 2024-12-07 RX ORDER — ASPIRIN 81 MG
81 TABLET,CHEWABLE ORAL DAILY
Qty: 90 TABLET | Refills: 2 | Status: SHIPPED | OUTPATIENT
Start: 2024-12-07

## 2024-12-10 DIAGNOSIS — F41.9 ANXIETY: ICD-10-CM

## 2024-12-10 RX ORDER — ESCITALOPRAM OXALATE 5 MG/1
5 TABLET ORAL DAILY
Qty: 90 TABLET | Refills: 1 | Status: SHIPPED | OUTPATIENT
Start: 2024-12-10

## 2024-12-10 NOTE — TELEPHONE ENCOUNTER
Reason for call:   [x] Refill   [] Prior Auth  [] Other:     Office:   [x] PCP/Provider -   [] Specialty/Provider -     Medication:   Escitalopram 5mg- take 1 tablet by mouth daily      Pharmacy: [facility] [address] [place]    Does the patient have enough for 3 days?   [] Yes   [x] No - Send as HP to POD

## 2025-01-18 DIAGNOSIS — I10 ESSENTIAL HYPERTENSION: ICD-10-CM

## 2025-01-20 RX ORDER — HYDROCHLOROTHIAZIDE 25 MG/1
50 TABLET ORAL DAILY
Qty: 180 TABLET | Refills: 0 | Status: SHIPPED | OUTPATIENT
Start: 2025-01-20

## 2025-03-28 DIAGNOSIS — R07.89 CHEST DISCOMFORT: ICD-10-CM

## 2025-03-28 RX ORDER — CLOPIDOGREL BISULFATE 75 MG/1
75 TABLET ORAL DAILY
Qty: 90 TABLET | Refills: 0 | Status: SHIPPED | OUTPATIENT
Start: 2025-03-28

## 2025-03-28 NOTE — TELEPHONE ENCOUNTER
Called pt and left message letting him know that his Plavix was sent to his [facility] pharmacy as a courtesy refill. Informed pt he needs updated blood work and that he can go to any [facility] lab to get it done. Left office call back number if pt has questions.

## 2025-03-30 DIAGNOSIS — R07.89 CHEST DISCOMFORT: ICD-10-CM

## 2025-03-30 DIAGNOSIS — I10 ESSENTIAL HYPERTENSION: ICD-10-CM

## 2025-03-31 DIAGNOSIS — I10 ESSENTIAL HYPERTENSION: ICD-10-CM

## 2025-03-31 RX ORDER — CARVEDILOL 25 MG/1
25 TABLET ORAL 2 TIMES DAILY WITH MEALS
Qty: 180 TABLET | Refills: 1 | Status: SHIPPED | OUTPATIENT
Start: 2025-03-31

## 2025-03-31 RX ORDER — AMLODIPINE BESYLATE 5 MG/1
5 TABLET ORAL DAILY
Qty: 90 TABLET | Refills: 1 | Status: SHIPPED | OUTPATIENT
Start: 2025-03-31

## 2025-04-02 RX ORDER — HYDROCHLOROTHIAZIDE 25 MG/1
50 TABLET ORAL DAILY
Qty: 180 TABLET | Refills: 3 | OUTPATIENT
Start: 2025-04-02

## 2025-05-10 ENCOUNTER — APPOINTMENT (OUTPATIENT)
Dept: LAB | Facility: HOSPITAL | Age: 50
End: 2025-05-10
Attending: FAMILY MEDICINE

## 2025-05-10 DIAGNOSIS — I21.4 NSTEMI (NON-ST ELEVATED MYOCARDIAL INFARCTION) (HCC): ICD-10-CM

## 2025-05-10 DIAGNOSIS — I10 ESSENTIAL HYPERTENSION: ICD-10-CM

## 2025-05-10 LAB
ALBUMIN SERPL BCG-MCNC: 4.3 G/DL (ref 3.5–5)
ALP SERPL-CCNC: 60 U/L (ref 34–104)
ALT SERPL W P-5'-P-CCNC: 25 U/L (ref 7–52)
ANION GAP SERPL CALCULATED.3IONS-SCNC: 7 MMOL/L (ref 4–13)
AST SERPL W P-5'-P-CCNC: 20 U/L (ref 13–39)
BASOPHILS # BLD AUTO: 0.12 THOUSANDS/ÂΜL (ref 0–0.1)
BASOPHILS NFR BLD AUTO: 1 % (ref 0–1)
BILIRUB SERPL-MCNC: 0.49 MG/DL (ref 0.2–1)
BUN SERPL-MCNC: 13 MG/DL (ref 5–25)
CALCIUM SERPL-MCNC: 10.2 MG/DL (ref 8.4–10.2)
CHLORIDE SERPL-SCNC: 101 MMOL/L (ref 96–108)
CO2 SERPL-SCNC: 31 MMOL/L (ref 21–32)
CREAT SERPL-MCNC: 0.79 MG/DL (ref 0.6–1.3)
EOSINOPHIL # BLD AUTO: 0.6 THOUSAND/ÂΜL (ref 0–0.61)
EOSINOPHIL NFR BLD AUTO: 5 % (ref 0–6)
ERYTHROCYTE [DISTWIDTH] IN BLOOD BY AUTOMATED COUNT: 17.2 % (ref 11.6–15.1)
EST. AVERAGE GLUCOSE BLD GHB EST-MCNC: 126 MG/DL
GFR SERPL CREATININE-BSD FRML MDRD: 106 ML/MIN/1.73SQ M
GLUCOSE P FAST SERPL-MCNC: 104 MG/DL (ref 65–99)
HBA1C MFR BLD: 6 %
HCT VFR BLD AUTO: 47.9 % (ref 36.5–49.3)
HGB BLD-MCNC: 15.2 G/DL (ref 12–17)
IMM GRANULOCYTES # BLD AUTO: 0.04 THOUSAND/UL (ref 0–0.2)
IMM GRANULOCYTES NFR BLD AUTO: 0 % (ref 0–2)
LYMPHOCYTES # BLD AUTO: 4.01 THOUSANDS/ÂΜL (ref 0.6–4.47)
LYMPHOCYTES NFR BLD AUTO: 35 % (ref 14–44)
MCH RBC QN AUTO: 24.5 PG (ref 26.8–34.3)
MCHC RBC AUTO-ENTMCNC: 31.7 G/DL (ref 31.4–37.4)
MCV RBC AUTO: 77 FL (ref 82–98)
MONOCYTES # BLD AUTO: 0.9 THOUSAND/ÂΜL (ref 0.17–1.22)
MONOCYTES NFR BLD AUTO: 8 % (ref 4–12)
NEUTROPHILS # BLD AUTO: 5.94 THOUSANDS/ÂΜL (ref 1.85–7.62)
NEUTS SEG NFR BLD AUTO: 51 % (ref 43–75)
NRBC BLD AUTO-RTO: 0 /100 WBCS
PLATELET # BLD AUTO: 420 THOUSANDS/UL (ref 149–390)
PMV BLD AUTO: 9.1 FL (ref 8.9–12.7)
POTASSIUM SERPL-SCNC: 4 MMOL/L (ref 3.5–5.3)
PROT SERPL-MCNC: 7.5 G/DL (ref 6.4–8.4)
RBC # BLD AUTO: 6.2 MILLION/UL (ref 3.88–5.62)
SODIUM SERPL-SCNC: 139 MMOL/L (ref 135–147)
WBC # BLD AUTO: 11.61 THOUSAND/UL (ref 4.31–10.16)

## 2025-05-10 PROCEDURE — 36415 COLL VENOUS BLD VENIPUNCTURE: CPT

## 2025-05-10 PROCEDURE — 85025 COMPLETE CBC W/AUTO DIFF WBC: CPT

## 2025-05-10 PROCEDURE — 80053 COMPREHEN METABOLIC PANEL: CPT

## 2025-05-10 PROCEDURE — 83036 HEMOGLOBIN GLYCOSYLATED A1C: CPT

## 2025-05-12 ENCOUNTER — RESULTS FOLLOW-UP (OUTPATIENT)
Dept: FAMILY MEDICINE CLINIC | Facility: CLINIC | Age: 50
End: 2025-05-12

## 2025-05-13 ENCOUNTER — OFFICE VISIT (OUTPATIENT)
Dept: FAMILY MEDICINE CLINIC | Facility: CLINIC | Age: 50
End: 2025-05-13

## 2025-05-13 VITALS
SYSTOLIC BLOOD PRESSURE: 158 MMHG | HEART RATE: 94 BPM | OXYGEN SATURATION: 97 % | HEIGHT: 67 IN | DIASTOLIC BLOOD PRESSURE: 90 MMHG | WEIGHT: 255 LBS | BODY MASS INDEX: 40.02 KG/M2 | RESPIRATION RATE: 16 BRPM

## 2025-05-13 DIAGNOSIS — E78.2 MIXED HYPERLIPIDEMIA: ICD-10-CM

## 2025-05-13 DIAGNOSIS — D75.1 POLYCYTHEMIA: ICD-10-CM

## 2025-05-13 DIAGNOSIS — I21.4 NSTEMI (NON-ST ELEVATED MYOCARDIAL INFARCTION) (HCC): ICD-10-CM

## 2025-05-13 DIAGNOSIS — R73.01 IMPAIRED FASTING BLOOD SUGAR: ICD-10-CM

## 2025-05-13 DIAGNOSIS — F41.9 ANXIETY: Primary | ICD-10-CM

## 2025-05-13 DIAGNOSIS — E66.9 OBESITY (BMI 30-39.9): ICD-10-CM

## 2025-05-13 DIAGNOSIS — I10 ESSENTIAL HYPERTENSION: ICD-10-CM

## 2025-05-13 PROBLEM — IMO0002 LUMP: Status: RESOLVED | Noted: 2020-01-15 | Resolved: 2025-05-13

## 2025-05-13 PROCEDURE — 99212 OFFICE O/P EST SF 10 MIN: CPT | Performed by: FAMILY MEDICINE

## 2025-05-13 RX ORDER — ESCITALOPRAM OXALATE 5 MG/1
5 TABLET ORAL DAILY
Qty: 90 TABLET | Refills: 1 | Status: SHIPPED | OUTPATIENT
Start: 2025-05-13

## 2025-05-13 NOTE — ASSESSMENT & PLAN NOTE
Patient notes that his anxiety has been well-controlled on current Lexapro dose  Orders:  •  escitalopram (LEXAPRO) 5 mg tablet; Take 1 tablet (5 mg total) by mouth daily

## 2025-05-13 NOTE — PROGRESS NOTES
Name: Gerry Cali      : 1975      MRN: [id number]  Encounter Provider: Maria Esther Muhammad MD  Encounter Date: 2025   Encounter department: [facility] [place]  :  Assessment & Plan  Anxiety  Patient notes that his anxiety has been well-controlled on current Lexapro dose  Orders:  •  escitalopram (LEXAPRO) 5 mg tablet; Take 1 tablet (5 mg total) by mouth daily    NSTEMI (non-ST elevated myocardial infarction) (HCC)  Patient had an NSTEMI with stent placement on 24.  Patient says he has been doing well since, denying any chest pain.  following with his cardiologist, and his next appointment is next month.       Mixed hyperlipidemia  Discussed the importance of diet and exercise in regulating cholesterol levels.  Will order follow-up labs to monitor his levels.  Orders:  •  Lipid panel; Future    Lab Results   Component Value Date    LDLCALC 63 2024       Impaired fasting blood sugar  Patient's last fasting glucose from 5/10 was 104, with a hemoglobin A1c of 6.0.  Emphasized the importance of proper diet, exercise, and weight loss.  Patient says it is tough for him with his work schedule but he will try his best.  Orders:  •  Comprehensive metabolic panel; Future  •  Hemoglobin A1C; Future    Polycythemia  Patient has chronic polycythemia which is likely secondary to his tobacco use.  Emphasized the importance of stopping smoking for his health.  Orders:  •  CBC and differential; Future    Essential hypertension  Patient's blood pressure is elevated in the office today.  Patient says he takes his blood pressure every day at home and it is usually not as high as it is here.  Told patient to continue monitoring his blood pressure at home       Obesity (BMI 30-39.9)    Discussed about weight loss medication but he has no insurance, he will work on diet and exercise             Depression Screening and Follow-up Plan: Patient was screened for depression during today's encounter. They  "screened negative with a PHQ-2 score of 0.        History of Present Illness   Patient is a 49-year-old male with a past medical history of hypertension, coronary artery disease, and tobacco use who is presenting to the office for a 6-month follow-up.  Patient says he is overall feeling well.  He notes that he had a stressful shift at work the night before.  He says he has been taking all of his medications as prescribed.  Patient says he is not had to use his nitroglycerin for any chest pain since his stent placement on 2/1/24.  Patient says that he still follows with his cardiologist.  His next appointment is next month.  Patient says his anxiety has been well-controlled on his Lexapro.  Patient notes that his diet lately has been poor as his work schedule has been unpredictable.  Patient also notes that he has been unable to quit smoking, saying he smokes around 4 cigars a day.  Patient is not interested in smoking cessation at this time.      Review of Systems   Constitutional:  Negative for chills and fever.   HENT:  Negative for ear pain and sore throat.    Eyes:  Negative for pain and visual disturbance.   Respiratory:  Negative for cough and shortness of breath.    Cardiovascular:  Negative for chest pain and palpitations.   Gastrointestinal:  Negative for abdominal pain and vomiting.   Genitourinary:  Negative for dysuria and hematuria.   Musculoskeletal:  Negative for arthralgias and back pain.   Skin:  Negative for color change and rash.   Neurological:  Negative for seizures and syncope.   All other systems reviewed and are negative.      Objective   /90   Pulse 94   Resp 16   Ht 5' 7\" (1.702 m)   Wt 116 kg (255 lb)   SpO2 97%   BMI 39.94 kg/m²      Physical Exam  Vitals and nursing note reviewed.   Constitutional:       General: He is not in acute distress.     Appearance: Normal appearance.   HENT:      Head: Normocephalic and atraumatic.      Right Ear: External ear normal.      Left Ear: " External ear normal.      Nose: Nose normal.   Eyes:      Extraocular Movements: Extraocular movements intact.      Conjunctiva/sclera: Conjunctivae normal.   Cardiovascular:      Rate and Rhythm: Normal rate and regular rhythm.      Heart sounds: Normal heart sounds. No murmur heard.     No friction rub. No gallop.   Pulmonary:      Effort: Pulmonary effort is normal.      Breath sounds: Normal breath sounds. No wheezing or rales.   Musculoskeletal:         General: No swelling, tenderness, deformity or signs of injury. Normal range of motion.      Cervical back: Normal range of motion.   Skin:     Coloration: Skin is not jaundiced or pale.      Findings: No rash.   Neurological:      General: No focal deficit present.      Mental Status: He is alert and oriented to person, place, and time.      Motor: No weakness.   Psychiatric:         Mood and Affect: Mood normal.         Behavior: Behavior normal.         Thought Content: Thought content normal.         Judgment: Judgment normal.

## 2025-05-13 NOTE — ASSESSMENT & PLAN NOTE
Patient's last fasting glucose from 5/10 was 104, with a hemoglobin A1c of 6.0.  Emphasized the importance of proper diet, exercise, and weight loss.  Patient says it is tough for him with his work schedule but he will try his best.  Orders:  •  Comprehensive metabolic panel; Future  •  Hemoglobin A1C; Future

## 2025-05-13 NOTE — ASSESSMENT & PLAN NOTE
Patient has chronic polycythemia which is likely secondary to his tobacco use.  Emphasized the importance of stopping smoking for his health.  Orders:  •  CBC and differential; Future

## 2025-05-13 NOTE — ASSESSMENT & PLAN NOTE
Patient's blood pressure is elevated in the office today.  Patient says he takes his blood pressure every day at home and it is usually not as high as it is here.  Told patient to continue monitoring his blood pressure at home

## 2025-05-13 NOTE — ASSESSMENT & PLAN NOTE
Discussed about weight loss medication but he has no insurance, he will work on diet and exercise

## 2025-05-13 NOTE — ASSESSMENT & PLAN NOTE
Patient had an NSTEMI with stent placement on 2/1/24.  Patient says he has been doing well since, denying any chest pain.  following with his cardiologist, and his next appointment is next month.

## 2025-05-13 NOTE — ASSESSMENT & PLAN NOTE
Discussed the importance of diet and exercise in regulating cholesterol levels.  Will order follow-up labs to monitor his levels.  Orders:  •  Lipid panel; Future    Lab Results   Component Value Date    LDLCALC 63 09/21/2024

## 2025-06-04 ENCOUNTER — OFFICE VISIT (OUTPATIENT)
Dept: CARDIOLOGY CLINIC | Facility: CLINIC | Age: 50
End: 2025-06-04

## 2025-06-04 VITALS
OXYGEN SATURATION: 98 % | SYSTOLIC BLOOD PRESSURE: 144 MMHG | DIASTOLIC BLOOD PRESSURE: 88 MMHG | BODY MASS INDEX: 39.08 KG/M2 | HEART RATE: 78 BPM | WEIGHT: 249 LBS | TEMPERATURE: 97.9 F | HEIGHT: 67 IN

## 2025-06-04 DIAGNOSIS — I25.2 HISTORY OF NON-ST ELEVATION MYOCARDIAL INFARCTION (NSTEMI): ICD-10-CM

## 2025-06-04 DIAGNOSIS — I25.118 CORONARY ARTERY DISEASE OF NATIVE ARTERY OF NATIVE HEART WITH STABLE ANGINA PECTORIS (HCC): ICD-10-CM

## 2025-06-04 DIAGNOSIS — Z72.0 TOBACCO USE: ICD-10-CM

## 2025-06-04 DIAGNOSIS — E78.2 MIXED HYPERLIPIDEMIA: ICD-10-CM

## 2025-06-04 DIAGNOSIS — I10 ESSENTIAL HYPERTENSION: ICD-10-CM

## 2025-06-04 PROCEDURE — 99214 OFFICE O/P EST MOD 30 MIN: CPT

## 2025-06-04 PROCEDURE — 93000 ELECTROCARDIOGRAM COMPLETE: CPT

## 2025-06-04 RX ORDER — CLOPIDOGREL BISULFATE 75 MG/1
75 TABLET ORAL DAILY
Qty: 90 TABLET | Refills: 3 | Status: SHIPPED | OUTPATIENT
Start: 2025-06-04

## 2025-06-04 RX ORDER — CARVEDILOL 25 MG/1
25 TABLET ORAL 2 TIMES DAILY WITH MEALS
Qty: 180 TABLET | Refills: 3 | Status: SHIPPED | OUTPATIENT
Start: 2025-06-04

## 2025-06-04 RX ORDER — AMLODIPINE BESYLATE 5 MG/1
5 TABLET ORAL DAILY
Qty: 90 TABLET | Refills: 3 | Status: SHIPPED | OUTPATIENT
Start: 2025-06-04

## 2025-06-04 RX ORDER — ATORVASTATIN CALCIUM 80 MG/1
80 TABLET, FILM COATED ORAL
Qty: 90 TABLET | Refills: 3 | Status: SHIPPED | OUTPATIENT
Start: 2025-06-04

## 2025-06-04 RX ORDER — ASPIRIN 81 MG/1
81 TABLET, CHEWABLE ORAL DAILY
Qty: 90 TABLET | Refills: 3 | Status: SHIPPED | OUTPATIENT
Start: 2025-06-04

## 2025-06-04 RX ORDER — HYDROCHLOROTHIAZIDE 25 MG/1
50 TABLET ORAL DAILY
Qty: 180 TABLET | Refills: 3 | Status: SHIPPED | OUTPATIENT
Start: 2025-06-04

## 2025-06-04 RX ORDER — LOSARTAN POTASSIUM 100 MG/1
100 TABLET ORAL DAILY
Qty: 90 TABLET | Refills: 3 | Status: SHIPPED | OUTPATIENT
Start: 2025-06-04

## 2025-06-04 NOTE — PROGRESS NOTES
Gerry Cali  1975  [id number]  [facility] [id number] [phone]    1. Essential hypertension  POCT ECG    amLODIPine (NORVASC) 5 mg tablet    hydroCHLOROthiazide 25 mg tablet    losartan (COZAAR) 100 MG tablet      2. History of non-ST elevation myocardial infarction (NSTEMI)  atorvastatin (LIPITOR) 80 mg tablet    aspirin (Aspirin Low Dose) 81 mg chewable tablet    carvedilol (COREG) 25 mg tablet    clopidogrel (PLAVIX) 75 mg tablet      3. Mixed hyperlipidemia  POCT ECG      4. Coronary artery disease of native artery of native heart with stable angina pectoris (HCC)  POCT ECG      5. Tobacco use            Summary/Discussion:  Coronary artery disease:  - with prior NSTEMI s/p HÉCTOR to D1 in 2/2024  residual 40% stenosis of ramus intermedius-- mild diffuse disease throughout, 50% stenosis of first obtuse marginal branch-- mild diffuse disease throughout left circumflex and RCA  - currently without symptoms of angina   - continue on aspirin, plavix, statin, and beta blocker. PRN SL nitro prescribed  - adherence to DTAP therapy reinforced in which patient has been taking without any missed doses  further discussion can be had with his cardiologist on stopping plavix now that he is 1 year s/p HÉCTOR. Will continue DAPT at this time given residual disease and no active bleeding issues   - lifestyle modifications were discussed  - did not complete cardiac rehab     Hypertension:  - blood pressure mildly elevated today, 144/68  reports feeling nervous/anxious during office visit   intermittently checks his blood pressures at home with reported SBP in the 120's   - continue present medication regimen  - lifestyle modification  - complete smoking cessation encouraged   - close blood pressure monitoring   - he has yet to follow up with sleep medicine which was recommended during prior office visits due to concern for underlying  NICKI    Dyslipidemia:  - Lipid Profile:    Latest Reference Range & Units 09/21/24 08:58   Cholesterol See Comment mg/dL 136   Triglycerides See Comment mg/dL 232 (H)   HDL >=40 mg/dL 27 (L)   Non-HDL Cholesterol mg/dl 109   LDL Calculated 0 - 100 mg/dL 63   - triglycerides remain elevated although has improved since 2022  - PCP follows  - continue atorvastatin 80 mg daily    - he would likely benefit from fenofibrate-- will defer to PCP  - encourage low cholesterol diet and annual lipid follow up     Suspected NICKI:  - has yet to follow up with sleep medicine which was recommended during prior office visits    Tobacco use:  - complete smoking cessation encouraged    Pre-diabetic:  - hemoglobin A1c: 6.0  - PCP follows  - lifestyle modifications discussed    Interval History: Gerry Cali is a 49 y.o. year old male with history mentioned in problem list who presents to the office today for a routine follow up.    Today, he reports overall feeling well from a cardiac standpoint.  He denies any chest pain/pressure/discomfort or shortness of breath. He denies lower extremity edema, orthopnea, and PND. He denies lightheadedness, dizziness, and syncope. He denies palpitations.  He reports adherence to his medication regimen.     He will RTO in 8 months with Dr. Arellano or sooner if necessary. He will call with any concerns.       Medical Problems       Problem List       Nicotine dependence    Class 3 severe obesity due to excess calories with serious comorbidity and body mass index (BMI) of 40.0 to 44.9 in adult ([facility])    Hypertension    Overview Deleted 4/9/2019  8:11 PM by Iain Chi PA-C            Hyperlipidemia    Loud snoring    Lump    Obesity, morbid, BMI 40.0-49.9 ([facility])    Essential hypertension    NSTEMI (non-ST elevated myocardial infarction) ([facility])    Obesity (BMI 30-39.9)    Tachycardia    Abnormal EKG    Encounter for long-term current use of medication    Polycythemia    Anxiety    Lump of skin of right  upper extremity    Hypertensive urgency    Coronary artery disease involving native coronary artery        Past Medical History:   Diagnosis Date    Coronary artery disease involving native coronary artery 02/01/2024    Hyperlipidemia     Hypertension     Nicotine dependence      Social History     Socioeconomic History    Marital status: Single     Spouse name: Not on file    Number of children: 1    Years of education: Not on file    Highest education level: Not on file   Occupational History    Not on file   Tobacco Use    Smoking status: Some Days     Types: Cigars    Smokeless tobacco: Never   Vaping Use    Vaping status: Never Used   Substance and Sexual Activity    Alcohol use: Yes     Comment: social alcohol use    Drug use: No    Sexual activity: Yes     Partners: Female     Birth control/protection: Female Sterilization   Other Topics Concern    Not on file   Social History Narrative    Caffeine use    Marital history - Currently  - As per [employer]    [employer]    Sedentary lifestyle    Working full time      Social Drivers of Health     Financial Resource Strain: Not on file   Food Insecurity: Not on file   Transportation Needs: Not on file   Physical Activity: Not on file   Stress: Not on file   Social Connections: Not on file   Intimate Partner Violence: Not on file   Housing Stability: Not on file      Family History   Problem Relation Name Age of Onset    Arthritis Mother      Hyperlipidemia Mother      Hypertension Mother      Kidney failure Mother      Other Father      Diabetes Father      Hypertension Father      Hypertension Sister       Past Surgical History:   Procedure Laterality Date    CARDIAC CATHETERIZATION Left 2/1/2024    Procedure: Cardiac Left Heart Cath;  Surgeon: Gerry Hickey MD;  Location: [facility];  Service: Cardiology    CARDIAC CATHETERIZATION N/A 2/1/2024    Procedure: Cardiac pci;  Surgeon: Gerry Hickey MD;  [facility]: [facility];  Service:  Cardiology    CARDIAC CATHETERIZATION Left 2/1/2024    Procedure: Cardiac Left Ventriculogram;  Surgeon: Gerry Hickey MD;  [facility]: [facility];  Service: Cardiology       Current Outpatient Medications:     amLODIPine (NORVASC) 5 mg tablet, Take 1 tablet (5 mg total) by mouth daily, Disp: 90 tablet, Rfl: 3    aspirin (Aspirin Low Dose) 81 mg chewable tablet, Chew 1 tablet (81 mg total) daily Chew, Disp: 90 tablet, Rfl: 3    atorvastatin (LIPITOR) 80 mg tablet, Take 1 tablet (80 mg total) by mouth daily with dinner, Disp: 90 tablet, Rfl: 3    carvedilol (COREG) 25 mg tablet, Take 1 tablet (25 mg total) by mouth 2 (two) times a day with meals, Disp: 180 tablet, Rfl: 3    clopidogrel (PLAVIX) 75 mg tablet, Take 1 tablet (75 mg total) by mouth daily, Disp: 90 tablet, Rfl: 3    escitalopram (LEXAPRO) 5 mg tablet, Take 1 tablet (5 mg total) by mouth daily, Disp: 90 tablet, Rfl: 1    hydroCHLOROthiazide 25 mg tablet, Take 2 tablets (50 mg total) by mouth daily, Disp: 180 tablet, Rfl: 3    losartan (COZAAR) 100 MG tablet, Take 1 tablet (100 mg total) by mouth daily, Disp: 90 tablet, Rfl: 3    nitroglycerin (NITROSTAT) 0.4 mg SL tablet, Place 1 tablet (0.4 mg total) under the tongue every 5 (five) minutes as needed for chest pain, Disp: 30 tablet, Rfl: 0    acetaminophen (TYLENOL) 325 mg tablet, Take 2 tablets (650 mg total) by mouth every 6 (six) hours as needed for mild pain or fever (Patient not taking: Reported on 10/29/2024), Disp: , Rfl:     nicotine (NICODERM CQ) 14 mg/24hr TD 24 hr patch, Place 1 patch on the skin over 24 hours daily (Patient not taking: Reported on 6/4/2025), Disp: 28 patch, Rfl: 1  No Known Allergies    Labs:     Chemistry        Component Value Date/Time     02/14/2015 1016    K 4.0 05/10/2025 1021    K 3.9 02/14/2015 1016     05/10/2025 1021     02/14/2015 1016    CO2 31 05/10/2025 1021    CO2 32 (H) 02/14/2015 1016    BUN 13 05/10/2025 1021    BUN 11 02/14/2015 1016  "   CREATININE 0.79 05/10/2025 1021    CREATININE 0.69 02/14/2015 1016        Component Value Date/Time    CALCIUM 10.2 05/10/2025 1021    CALCIUM 8.9 02/14/2015 1016    ALKPHOS 60 05/10/2025 1021    ALKPHOS 84 02/14/2015 1016    AST 20 05/10/2025 1021    AST 32 02/14/2015 1016    ALT 25 05/10/2025 1021    ALT 61 (H) 02/14/2015 1016    BILITOT 0.5 02/14/2015 1016            Lab Results   Component Value Date    CHOL 231 02/14/2015    CHOL 252 12/06/2013     Lab Results   Component Value Date    HDL 27 (L) 09/21/2024    HDL 23 (L) 03/11/2024    HDL 28 (L) 02/01/2024     Lab Results   Component Value Date    LDLCALC 63 09/21/2024    LDLCALC 46 03/11/2024    LDLCALC 88 02/01/2024     Lab Results   Component Value Date    TRIG 232 (H) 09/21/2024    TRIG 203 (H) 03/11/2024    TRIG 342 (H) 02/01/2024     No results found for: \"CHOLHDL\"    Imaging: Cardiac catheterization    Result Date: 2/1/2024  Narrative:   1st Mrg lesion is 50% stenosed.   Ramus lesion is 40% stenosed.   1st Diag lesion is 95% stenosed.   The left ventricular systolic function is normal.   The angioplasty was pre-stent angioplasty. 2v CAD     Echo complete w/ contrast if indicated    Result Date: 2/1/2024  Narrative:   Left Ventricle: Left ventricular cavity size is normal. Wall thickness is mildly increased. The left ventricular ejection fraction is 55%. Systolic function is normal. Wall motion is normal. Diastolic function is mildly abnormal, consistent with grade I (abnormal) relaxation.   Tricuspid Valve: There is mild regurgitation.     XR chest 1 view portable    Result Date: 1/31/2024  Narrative: CHEST INDICATION:   chest pain. COMPARISON: 11/17/2013 EXAM PERFORMED/VIEWS:  XR CHEST PORTABLE FINDINGS: Cardiomediastinal silhouette appears unremarkable. The lungs are clear.  No pneumothorax or pleural effusion. Osseous structures appear within normal limits for patient age.     Impression: No acute cardiopulmonary disease. Workstation performed: " "[id number]       ECG:  sinus rhythm, right axis deviation, ns intraventricular conduction delay (noted on prior EKG)    Review of Systems   Psychiatric/Behavioral:  The patient is nervous/anxious.    All other systems reviewed and are negative.      Vitals:    06/04/25 0832   BP: 144/88   Pulse: 78   Temp: 97.9 °F (36.6 °C)   SpO2: 98%       Vitals:    06/04/25 0832   Weight: 113 kg (249 lb)       Height: 5' 7\" (170.2 cm)   Body mass index is 39 kg/m².    Physical Exam  Vitals reviewed.   Constitutional:       General: He is not in acute distress.     Appearance: Normal appearance. He is not ill-appearing.   HENT:      Head: Normocephalic.      Nose: Nose normal.      Mouth/Throat:      Mouth: Mucous membranes are moist.      Pharynx: Oropharynx is clear.   Neck:      Vascular: No carotid bruit or JVD.     Cardiovascular:      Rate and Rhythm: Normal rate and regular rhythm.      Pulses: Normal pulses.      Heart sounds: Normal heart sounds. No murmur heard.  Pulmonary:      Effort: Pulmonary effort is normal.      Breath sounds: Normal breath sounds. No wheezing.     Musculoskeletal:      Cervical back: Normal range of motion.      Right lower leg: No edema.      Left lower leg: No edema.     Skin:     General: Skin is warm and dry.     Neurological:      Mental Status: He is alert and oriented to person, place, and time.     Psychiatric:         Mood and Affect: Mood is anxious.        "

## (undated) DEVICE — CATH DIAG 5FR IMPULSE 110CM 6S PIG 145 ANG

## (undated) DEVICE — RADIFOCUS OPTITORQUE ANGIOGRAPHIC CATHETER: Brand: OPTITORQUE

## (undated) DEVICE — GUIDEWIRE .035 260CM 3MM J TIP

## (undated) DEVICE — CATH GUIDE LAUNCHER 6FR EBU 3.5

## (undated) DEVICE — RUNTHROUGH NS EXTRA FLOPPY PTCA GUIDEWIRE: Brand: RUNTHROUGH

## (undated) DEVICE — GLIDESHEATH SLENDER STAINLESS STEEL KIT: Brand: GLIDESHEATH SLENDER

## (undated) DEVICE — MINI TREK CORONARY DILATATION CATHETER 2.0 MM X 15 MM / RAPID-EXCHANGE: Brand: MINI TREK

## (undated) DEVICE — GUIDEWIRE WHOLEY .035 145 CM FLOP STR TIP